# Patient Record
Sex: FEMALE | Race: WHITE | NOT HISPANIC OR LATINO | Employment: OTHER | URBAN - NONMETROPOLITAN AREA
[De-identification: names, ages, dates, MRNs, and addresses within clinical notes are randomized per-mention and may not be internally consistent; named-entity substitution may affect disease eponyms.]

---

## 2017-01-04 RX ORDER — PANTOPRAZOLE SODIUM 40 MG/1
TABLET, DELAYED RELEASE ORAL
Qty: 90 TAB | Refills: 3 | Status: SHIPPED | OUTPATIENT
Start: 2017-01-04 | End: 2017-12-19 | Stop reason: SDUPTHER

## 2017-01-14 DIAGNOSIS — F33.1 MODERATE EPISODE OF RECURRENT MAJOR DEPRESSIVE DISORDER (HCC): ICD-10-CM

## 2017-01-16 RX ORDER — SERTRALINE HYDROCHLORIDE 100 MG/1
100 TABLET, FILM COATED ORAL DAILY
Qty: 90 TAB | Refills: 3 | Status: SHIPPED | OUTPATIENT
Start: 2017-01-16 | End: 2017-04-11 | Stop reason: SDUPTHER

## 2017-01-31 ENCOUNTER — PATIENT MESSAGE (OUTPATIENT)
Dept: MEDICAL GROUP | Facility: PHYSICIAN GROUP | Age: 67
End: 2017-01-31

## 2017-01-31 DIAGNOSIS — F51.01 PRIMARY INSOMNIA: ICD-10-CM

## 2017-01-31 RX ORDER — TRAZODONE HYDROCHLORIDE 50 MG/1
50-100 TABLET ORAL
Qty: 60 TAB | Refills: 11 | Status: SHIPPED | OUTPATIENT
Start: 2017-01-31 | End: 2017-05-09

## 2017-01-31 NOTE — TELEPHONE ENCOUNTER
From: Ting Gregory  To: Tianna Tai M.D.  Sent: 1/31/2017 9:26 AM PST  Subject: Non-Urgent Medical Question    Hi Dr. ORELLANA, hope all is well with you and little one. I think it's time to replace the Ambien, find myself still awake most nights for 4 to 5 hours before going to sleep. Once in a while I have to then go take another pill and that'll work but I know you probably don't want me to do that so need your help. Thank you and will miss you on my next appt. in May. Take care. Ting

## 2017-02-11 ENCOUNTER — PATIENT MESSAGE (OUTPATIENT)
Dept: MEDICAL GROUP | Facility: PHYSICIAN GROUP | Age: 67
End: 2017-02-11

## 2017-02-11 DIAGNOSIS — F51.01 PRIMARY INSOMNIA: ICD-10-CM

## 2017-02-14 RX ORDER — ESZOPICLONE 2 MG/1
2 TABLET, FILM COATED ORAL
Qty: 30 TAB | Refills: 3 | Status: SHIPPED
Start: 2017-02-14 | End: 2017-05-09

## 2017-02-17 ENCOUNTER — TELEPHONE (OUTPATIENT)
Dept: MEDICAL GROUP | Facility: PHYSICIAN GROUP | Age: 67
End: 2017-02-17

## 2017-02-17 NOTE — TELEPHONE ENCOUNTER
Guru called and wanted to clarify which sleep prescription that Ting was taking as she has three different medications and all have refills.  I read the last note and called the pharmacy to confirm that the lunesta did not need a prior authorization processed.  Insurance covered it without the need for the p.a.   The pharmacy cancelled the refills of the trazadone and the ambien and processed the lunesta for .  Pt has been notified.

## 2017-04-11 DIAGNOSIS — F33.1 MODERATE EPISODE OF RECURRENT MAJOR DEPRESSIVE DISORDER (HCC): ICD-10-CM

## 2017-04-11 RX ORDER — SERTRALINE HYDROCHLORIDE 100 MG/1
100 TABLET, FILM COATED ORAL DAILY
Qty: 90 TAB | Refills: 3 | Status: SHIPPED | OUTPATIENT
Start: 2017-04-11 | End: 2017-08-28

## 2017-04-11 RX ORDER — SERTRALINE HYDROCHLORIDE 100 MG/1
TABLET, FILM COATED ORAL
Refills: 0 | OUTPATIENT
Start: 2017-04-11

## 2017-05-01 ENCOUNTER — HOSPITAL ENCOUNTER (OUTPATIENT)
Dept: LAB | Facility: MEDICAL CENTER | Age: 67
End: 2017-05-01
Attending: INTERNAL MEDICINE
Payer: MEDICARE

## 2017-05-01 DIAGNOSIS — E11.42 WELL CONTROLLED TYPE 2 DIABETES MELLITUS WITH PERIPHERAL NEUROPATHY (HCC): ICD-10-CM

## 2017-05-01 DIAGNOSIS — E78.00 PURE HYPERCHOLESTEROLEMIA: ICD-10-CM

## 2017-05-01 LAB
ALBUMIN SERPL BCP-MCNC: 4.6 G/DL (ref 3.2–4.9)
ALBUMIN/GLOB SERPL: 1.8 G/DL
ALP SERPL-CCNC: 99 U/L (ref 30–99)
ALT SERPL-CCNC: 24 U/L (ref 2–50)
ANION GAP SERPL CALC-SCNC: 10 MMOL/L (ref 0–11.9)
AST SERPL-CCNC: 15 U/L (ref 12–45)
BILIRUB SERPL-MCNC: 0.6 MG/DL (ref 0.1–1.5)
BUN SERPL-MCNC: 17 MG/DL (ref 8–22)
CALCIUM SERPL-MCNC: 9.3 MG/DL (ref 8.5–10.5)
CHLORIDE SERPL-SCNC: 102 MMOL/L (ref 96–112)
CHOLEST SERPL-MCNC: 260 MG/DL (ref 100–199)
CO2 SERPL-SCNC: 26 MMOL/L (ref 20–33)
CREAT SERPL-MCNC: 0.88 MG/DL (ref 0.5–1.4)
CREAT UR-MCNC: 443.9 MG/DL
EST. AVERAGE GLUCOSE BLD GHB EST-MCNC: 160 MG/DL
GFR SERPL CREATININE-BSD FRML MDRD: >60 ML/MIN/1.73 M 2
GLOBULIN SER CALC-MCNC: 2.6 G/DL (ref 1.9–3.5)
GLUCOSE SERPL-MCNC: 136 MG/DL (ref 65–99)
HBA1C MFR BLD: 7.2 % (ref 0–5.6)
HDLC SERPL-MCNC: 46 MG/DL
LDLC SERPL CALC-MCNC: 182 MG/DL
MICROALBUMIN UR-MCNC: 18.6 MG/DL
MICROALBUMIN/CREAT UR: 42 MG/G (ref 0–30)
POTASSIUM SERPL-SCNC: 4.2 MMOL/L (ref 3.6–5.5)
PROT SERPL-MCNC: 7.2 G/DL (ref 6–8.2)
SODIUM SERPL-SCNC: 138 MMOL/L (ref 135–145)
TRIGL SERPL-MCNC: 162 MG/DL (ref 0–149)
TSH SERPL DL<=0.005 MIU/L-ACNC: 1.29 UIU/ML (ref 0.3–3.7)

## 2017-05-01 PROCEDURE — 82043 UR ALBUMIN QUANTITATIVE: CPT

## 2017-05-01 PROCEDURE — 80053 COMPREHEN METABOLIC PANEL: CPT

## 2017-05-01 PROCEDURE — 84443 ASSAY THYROID STIM HORMONE: CPT

## 2017-05-01 PROCEDURE — 36415 COLL VENOUS BLD VENIPUNCTURE: CPT | Mod: GA

## 2017-05-01 PROCEDURE — 82570 ASSAY OF URINE CREATININE: CPT

## 2017-05-01 PROCEDURE — 83036 HEMOGLOBIN GLYCOSYLATED A1C: CPT | Mod: GA

## 2017-05-01 PROCEDURE — 80061 LIPID PANEL: CPT

## 2017-05-09 ENCOUNTER — OFFICE VISIT (OUTPATIENT)
Dept: MEDICAL GROUP | Facility: PHYSICIAN GROUP | Age: 67
End: 2017-05-09
Payer: MEDICARE

## 2017-05-09 VITALS
BODY MASS INDEX: 25.43 KG/M2 | HEART RATE: 81 BPM | OXYGEN SATURATION: 96 % | HEIGHT: 67 IN | TEMPERATURE: 98.4 F | DIASTOLIC BLOOD PRESSURE: 84 MMHG | SYSTOLIC BLOOD PRESSURE: 146 MMHG | RESPIRATION RATE: 16 BRPM | WEIGHT: 162 LBS

## 2017-05-09 DIAGNOSIS — E11.42 WELL CONTROLLED TYPE 2 DIABETES MELLITUS WITH PERIPHERAL NEUROPATHY (HCC): ICD-10-CM

## 2017-05-09 DIAGNOSIS — F33.1 MAJOR DEPRESSIVE DISORDER, RECURRENT EPISODE, MODERATE (HCC): ICD-10-CM

## 2017-05-09 DIAGNOSIS — Z00.00 HEALTH CARE MAINTENANCE: ICD-10-CM

## 2017-05-09 DIAGNOSIS — I10 ESSENTIAL HYPERTENSION: ICD-10-CM

## 2017-05-09 DIAGNOSIS — E78.00 PURE HYPERCHOLESTEROLEMIA: ICD-10-CM

## 2017-05-09 PROCEDURE — G8419 CALC BMI OUT NRM PARAM NOF/U: HCPCS | Performed by: NURSE PRACTITIONER

## 2017-05-09 PROCEDURE — 3045F PR MOST RECENT HEMOGLOBIN A1C LEVEL 7.0-9.0%: CPT | Performed by: NURSE PRACTITIONER

## 2017-05-09 PROCEDURE — 1101F PT FALLS ASSESS-DOCD LE1/YR: CPT | Performed by: NURSE PRACTITIONER

## 2017-05-09 PROCEDURE — 3014F SCREEN MAMMO DOC REV: CPT | Mod: 8P | Performed by: NURSE PRACTITIONER

## 2017-05-09 PROCEDURE — 99214 OFFICE O/P EST MOD 30 MIN: CPT | Performed by: NURSE PRACTITIONER

## 2017-05-09 PROCEDURE — G8432 DEP SCR NOT DOC, RNG: HCPCS | Performed by: NURSE PRACTITIONER

## 2017-05-09 PROCEDURE — 1036F TOBACCO NON-USER: CPT | Performed by: NURSE PRACTITIONER

## 2017-05-09 PROCEDURE — 4040F PNEUMOC VAC/ADMIN/RCVD: CPT | Performed by: NURSE PRACTITIONER

## 2017-05-09 ASSESSMENT — PATIENT HEALTH QUESTIONNAIRE - PHQ9
5. POOR APPETITE OR OVEREATING: 2 - MORE THAN HALF THE DAYS
SUM OF ALL RESPONSES TO PHQ QUESTIONS 1-9: 14
CLINICAL INTERPRETATION OF PHQ2 SCORE: 6

## 2017-05-09 NOTE — ASSESSMENT & PLAN NOTE
Patient declines most health care maintenance exams including Pap smears, mammograms and bone density and colon cancer screening exams. She does understand the risks associated with not having these done. She is willing to have routine fasting labs, she is here to go over her most recent labs today. She will be due for repeat labs in 6 months.

## 2017-05-09 NOTE — ASSESSMENT & PLAN NOTE
This is a chronic condition, poorly controlled. She is not on statin therapy as she does not tolerate. She is also allergic to fenofibrate. Her lipid profile is as follows:  Component      Latest Ref Rng 5/1/2017           8:05 AM   Cholesterol,Tot      100 - 199 mg/dL 260 (H)   Triglycerides      0 - 149 mg/dL 162 (H)   HDL      >=40 mg/dL 46   LDL      <100 mg/dL 182 (H)   She continues to work on this via diet and exercise. She is encouraged to continue with low-fat, low-cholesterol diet. I did discuss with her that she is in significantly increased risk for cardiovascular disease, patient verbalizes understanding. We will recheck this in 6 months.

## 2017-05-09 NOTE — ASSESSMENT & PLAN NOTE
Chronic in nature, stable and usually well controlled on amlodipine 10 mg daily. Her blood pressure today is 146/84 and she believes this is due to her being upset as it is around this time that she lost her  about 3 years ago to cancer. She tolerates her medications well with no significant bothersome side effects. She does not need refills at this time but will call when needed. We reviewed labs today all are essentially normal except for lipid profile (see additional notes). We will repeat labs in 6 months.

## 2017-05-09 NOTE — ASSESSMENT & PLAN NOTE
This is chronic in nature, stable and very well controlled on diet and exercise. Hemoglobin A1c is 7.2 and microalbumin creatinine ratio is mildly elevated at 42. She has gone up from 6.8 from November 2016. She admits to not eating very healthy recently, stating that April and May for a very difficult time for her due to the loss of her  around this time about 3 years ago. She states she is going to get back into healthy eating and regular physical activity. She was also encouraged to increase her fluids. She is unable to tolerate statins or Ace inhibitors. We will recheck labs in 6 months.

## 2017-05-09 NOTE — PATIENT INSTRUCTIONS
Follow up with me in 6 months, sooner if needed, with labs done before visit    Meds as prescribed     Call me with any concerns about BP and dose of medications

## 2017-05-09 NOTE — MR AVS SNAPSHOT
"        Ting Nguyễn Bri   2017 9:20 AM   Office Visit   MRN: [id number]    Department:  [facility]   Dept Phone:  [phone]    Description:  Female : 1950   Provider:  OLINDA Jones           Reason for Visit     Diabetes           Allergies as of 2017     Allergen Noted Reactions    Ace Inhibitors 2012   Swelling    Demerol 2017   Rash    Rash      Fiorinal 2017   Rash    Rash      Minipress [Fd&C Blue #1-Fd&C Red #40-Prazosin] 2017   Unspecified    Cannot remember reaction    Prednisone 2017   Unspecified    Migraine, joint pain swelling    Trilipix [Choline Fenofibrate] 2012       Angioedema, hives    Vicodin [Hydrocodone-Acetaminophen] 2017   Rash    Rash        You were diagnosed with     Major depressive disorder, recurrent episode, moderate (CMS-HCC)   [296.32.ICD-9-CM]       Essential hypertension   [6768818]       Pure hypercholesterolemia   [272.0.ICD-9-CM]       Well controlled type 2 diabetes mellitus with peripheral neuropathy (CMS-HCC)   [644675]         Vital Signs     Blood Pressure Pulse Temperature Respirations Height Weight    146/84 mmHg 81 36.9 °C (98.4 °F) 16 1.702 m (5' 7\") 73.483 kg (162 lb)    Body Mass Index Oxygen Saturation Smoking Status             25.37 kg/m2 96% Former Smoker         Basic Information     Date Of Birth Sex Race Ethnicity Preferred Language    1950 Female White Non- English      Problem List              ICD-10-CM Priority Class Noted - Resolved    Well controlled type 2 diabetes mellitus with peripheral neuropathy (CMS-HCC) E11.42   Unknown - Present    Hyperlipidemia E78.5   Unknown - Present    Hypertension I10   2012 - Present    Depression F32.9   2012 - Present    GERD (gastroesophageal reflux disease) K21.9   2013 - Present    Peripheral neuropathy G62.9   4/15/2015 - Present    Insomnia G47.00   2015 - Present    DNR (do not resuscitate) Z66   " 11/4/2015 - Present    DNI (do not intubate) Z78.9   11/4/2015 - Present    Major depressive disorder, recurrent episode, moderate (CMS-HCC) F33.1   11/4/2015 - Present    Other chest pain R07.89   5/4/2016 - Present    Allergy to statin medication Z88.8   11/8/2016 - Present      Health Maintenance        Date Due Completion Dates    DIABETES MONOFILAMENT / LE EXAM 4/15/2016 4/15/2015 (Done), 11/29/2012 (N/S)    Override on 4/15/2015: Done    Override on 11/29/2012: (N/S)    MAMMOGRAM 11/4/2016 11/4/2015 (Declined), 8/26/2013, 1/30/2012, 10/29/2009, 10/29/2009, 2/21/2008, 2/21/2008, 12/29/2006, 10/14/2005, 4/6/2005, 10/7/2004, 9/28/2004    Override on 11/4/2015: Patient Declined    PAP SMEAR 4/1/2017 4/1/2014    BONE DENSITY 5/1/2018 (Originally 8/2/2016) ---    RETINAL SCREENING 8/9/2017 8/9/2016    A1C SCREENING 11/1/2017 5/1/2017, 10/19/2016, 4/6/2016, 10/7/2015, 5/20/2015, 8/20/2014, 3/19/2014, 8/21/2013, 11/21/2012, 1/24/2012, 1/11/2010, 10/15/2009, 6/29/2009    FASTING LIPID PROFILE 5/1/2018 5/1/2017, 10/19/2016, 4/6/2016, 10/7/2015, 5/20/2015, 8/20/2014, 8/21/2013, 11/21/2012, 1/24/2012, 1/11/2010, 6/29/2009, 4/13/2006    URINE ACR / MICROALBUMIN 5/1/2018 5/1/2017, 10/7/2015, 8/20/2014, 6/29/2009    SERUM CREATININE 5/1/2018 5/1/2017, 10/19/2016, 10/7/2015, 5/20/2015, 8/20/2014, 8/21/2013, 11/21/2012, 1/24/2012, 6/29/2009, 4/13/2006    IMM PNEUMOCOCCAL 65+ (ADULT) LOW/MEDIUM RISK SERIES (2 of 2 - PPSV23) 8/15/2018 11/9/2016, 8/15/2013    COLONOSCOPY 11/4/2025 11/4/2015 (Declined), 11/29/2008 (N/S)    Override on 11/4/2015: Patient Declined    Override on 11/29/2008: (N/S)    IMM DTaP/Tdap/Td Vaccine (2 - Td) 11/9/2026 11/9/2016            Current Immunizations     13-VALENT PCV PREVNAR 11/9/2016    INFLUENZA VACCINE H1N1 1/11/2010 11:15 AM    Influenza TIV (IM) 10/8/2016, 10/6/2015    Pneumococcal polysaccharide vaccine (PPSV-23) 8/15/2013 11:15 AM    SHINGLES VACCINE 11/9/2016    Tdap Vaccine 11/9/2016         Below and/or attached are the medications your provider expects you to take. Review all of your home medications and newly ordered medications with your provider and/or pharmacist. Follow medication instructions as directed by your provider and/or pharmacist. Please keep your medication list with you and share with your provider. Update the information when medications are discontinued, doses are changed, or new medications (including over-the-counter products) are added; and carry medication information at all times in the event of emergency situations     Allergies:  ACE INHIBITORS - Swelling     DEMEROL - Rash     FIORINAL - Rash     MINIPRESS - Unspecified     PREDNISONE - Unspecified     TRILIPIX - (reactions not documented)     VICODIN - Rash               Medications  Valid as of: May 09, 2017 -  9:44 AM    Generic Name Brand Name Tablet Size Instructions for use    AmLODIPine Besylate (Tab) NORVASC 10 MG Take 1 Tab by mouth every day.        Cholecalciferol (Tab) Vitamin D3 5000 UNITS Take 1,000 mg by mouth.        Pantoprazole Sodium (Tablet Delayed Response) PROTONIX 40 MG TAKE 1 TABLET BY MOUTH EVERY DAY        Sertraline HCl (Tab) ZOLOFT 100 MG Take 1 Tab by mouth every day.        .                 Medicines prescribed today were sent to:     [place] [address]    [address] [facility] [address]    Phone: [phone] Fax: [phone]    Open 24 Hours?: No    [facility] NOT VALID, USE [facility] - [facility], [place] - [address]    [address] [facility] [address]    Phone: [phone] Fax: [phone]    Open 24 Hours?: No    [facility] [place] [facility] [address]    [address] [facility] [place] [address]    Phone: [phone] Fax: [phone]    Open 24 Hours?: No      Medication refill instructions:       If your prescription bottle indicates you have medication refills left, it is not necessary to call  your provider’s office. Please contact your pharmacy and they will refill your medication.    If your prescription bottle indicates you do not have any refills left, you may request refills at any time through one of the following ways: The online [employer] system (except Urgent Care), by calling your provider’s office, or by asking your pharmacy to contact your provider’s office with a refill request. Medication refills are processed only during regular business hours and may not be available until the next business day. Your provider may request additional information or to have a follow-up visit with you prior to refilling your medication.   *Please Note: Medication refills are assigned a new Rx number when refilled electronically. Your pharmacy may indicate that no refills were authorized even though a new prescription for the same medication is available at the pharmacy. Please request the medicine by name with the pharmacy before contacting your provider for a refill.        Your To Do List     Future Labs/Procedures Complete By Expires    COMP METABOLIC PANEL  As directed 5/9/2018    HEMOGLOBIN A1C  As directed 5/9/2018    LIPID PROFILE  As directed 5/9/2018    MICROALBUMIN CREAT RATIO URINE  As directed 5/9/2018      Instructions    Follow up with me in 6 months, sooner if needed, with labs done before visit    Meds as prescribed     Call me with any concerns about BP and dose of medications       Other Notes About Your Plan     Patient has DNR papers. 11/2015           [employer] Access Code: Activation code not generated  Current [employer] Status: Active

## 2017-05-09 NOTE — PROGRESS NOTES
Chief Complaint   Patient presents with   • Diabetes         This is a 66 y.o.female patient that presents today with the following: Social care with new PCP, acute and chronic conditions, review labs    Health care maintenance  Patient declines most health care maintenance exams including Pap smears, mammograms and bone density and colon cancer screening exams. She does understand the risks associated with not having these done. She is willing to have routine fasting labs, she is here to go over her most recent labs today. She will be due for repeat labs in 6 months.    Well controlled type 2 diabetes mellitus with peripheral neuropathy  This is chronic in nature, stable and very well controlled on diet and exercise. Hemoglobin A1c is 7.2 and microalbumin creatinine ratio is mildly elevated at 42. She has gone up from 6.8 from November 2016. She admits to not eating very healthy recently, stating that April and May for a very difficult time for her due to the loss of her  around this time about 3 years ago. She states she is going to get back into healthy eating and regular physical activity. She was also encouraged to increase her fluids. She is unable to tolerate statins or Ace inhibitors. We will recheck labs in 6 months.    Major depressive disorder, recurrent episode, moderate  This is a chronic condition, currently poor to fair control--she thinks because of the time of year as May is when she lost her  about 3 years ago to cancer. She scores a 14 on the depression screening test today. She is currently on Zoloft, 100 mg daily. She tolerates this well with no significant or bothersome side effects. We did discuss either increasing the dose or changing to a different medication. She declines this at this time and states that she will come see me at a later date if her symptoms do not improve over the next few months. She does deny suicidal and homicidal ideations, hallucinations, racing thoughts  and flights of ideas.    Hypertension  Chronic in nature, stable and usually well controlled on amlodipine 10 mg daily. Her blood pressure today is 146/84 and she believes this is due to her being upset as it is around this time that she lost her  about 3 years ago to cancer. She tolerates her medications well with no significant bothersome side effects. She does not need refills at this time but will call when needed. We reviewed labs today all are essentially normal except for lipid profile (see additional notes). We will repeat labs in 6 months.    Hyperlipidemia  This is a chronic condition, poorly controlled. She is not on statin therapy as she does not tolerate. She is also allergic to fenofibrate. Her lipid profile is as follows:  Component      Latest Ref Rng 5/1/2017           8:05 AM   Cholesterol,Tot      100 - 199 mg/dL 260 (H)   Triglycerides      0 - 149 mg/dL 162 (H)   HDL      >=40 mg/dL 46   LDL      <100 mg/dL 182 (H)   She continues to work on this via diet and exercise. She is encouraged to continue with low-fat, low-cholesterol diet. I did discuss with her that she is in significantly increased risk for cardiovascular disease, patient verbalizes understanding. We will recheck this in 6 months.      Hospital Outpatient Visit on 05/01/2017   Component Date Value   • Glycohemoglobin 05/01/2017 7.2*   • Est Avg Glucose 05/01/2017 160    • Sodium 05/01/2017 138    • Potassium 05/01/2017 4.2    • Chloride 05/01/2017 102    • Co2 05/01/2017 26    • Anion Gap 05/01/2017 10.0    • Glucose 05/01/2017 136*   • Bun 05/01/2017 17    • Creatinine 05/01/2017 0.88    • Calcium 05/01/2017 9.3    • AST(SGOT) 05/01/2017 15    • ALT(SGPT) 05/01/2017 24    • Alkaline Phosphatase 05/01/2017 99    • Total Bilirubin 05/01/2017 0.6    • Albumin 05/01/2017 4.6    • Total Protein 05/01/2017 7.2    • Globulin 05/01/2017 2.6    • A-G Ratio 05/01/2017 1.8    • Cholesterol,Tot 05/01/2017 260*   • Triglycerides  2017 162*   • HDL 2017 46    • LDL 2017 182*   • Creatinine, Urine 2017 443.90    • Microalbumin, Urine Rand* 2017 18.6    • Micro Alb Creat Ratio 2017 42*   • TSH 2017 1.290    • GFR If  2017 >60    • GFR If Non  Ameri* 2017 >60          clinical course has been stable    Past Medical History   Diagnosis Date   • Diabetes mellitus type 2 in nonobese (CMS-HCC)    • Hyperlipidemia    • Asthma    • History of mammogram      fibrocystic breast disease   • Pelvic exam      ASCUS, then had hysterectomy   • S/P colonoscopy         • Irritable bowel syndrome    • Vitamin D deficiency        Past Surgical History   Procedure Laterality Date   • Hysterectomy, total abdominal       took everything   • Kristin by laparoscopy     • Dilation and curettage       x4   • Cystectomy         Family History   Problem Relation Age of Onset   • Hypertension Mother    • Other Father      RA,  age 40       Ace inhibitors; Demerol; Fiorinal; Minipress; Prednisone; Trilipix; and Vicodin    Current Outpatient Prescriptions Ordered in [facility]   Medication Sig Dispense Refill   • sertraline (ZOLOFT) 100 MG Tab Take 1 Tab by mouth every day. 90 Tab 3   • pantoprazole (PROTONIX) 40 MG Tablet Delayed Response TAKE 1 TABLET BY MOUTH EVERY DAY 90 Tab 3   • amlodipine (NORVASC) 10 MG Tab Take 1 Tab by mouth every day. 90 Tab 3   • Cholecalciferol (VITAMIN D3) 5000 UNIT TABS Take 1,000 mg by mouth.       No current Epic-ordered facility-administered medications on file.       Constitutional ROS: No unexpected change in weight, No weakness, No unexplained fevers, sweats, or chills  Pulmonary ROS: No chronic cough, sputum, or hemoptysis, No shortness of breath, No recent change in breathing  Cardiovascular ROS: No chest pain, No edema, No palpitations, Positive for hypertension, per history of present illness  Gastrointestinal ROS: No abdominal pain, No nausea, vomiting,  "diarrhea, or constipation, no blood in stool  Musculoskeletal/Extremities ROS: No clubbing, No peripheral edema, No pain, redness or swelling on the joints  Neurologic ROS: Normal development, No seizures, No weakness  Psychiatric ROS: As per history of present illness  Endocrine ROS: Positive per history of present illness    Physical exam:  /84 mmHg  Pulse 81  Temp(Src) 36.9 °C (98.4 °F)  Resp 16  Ht 1.702 m (5' 7\")  Wt 73.483 kg (162 lb)  BMI 25.37 kg/m2  SpO2 96%  General Appearance: Older female, alert, no distress, mildly overweight, well-groomed  Skin: Skin color, texture, turgor normal. No rashes or lesions.  Lungs: negative findings: normal respiratory rate and rhythm, lungs clear to auscultation  Heart: negative. RRR without murmur, gallop, or rubs.  No ectopy.  Abdomen: Abdomen soft, non-tender. BS normal. No masses,  No organomegaly  Musculoskeletal: negative findings: no erythema, induration, or nodules, no evidence of joint effusion, strength normal, no deformities present  Neurologic: intact, oriented, mood appropriate, judgment intact. Cranial nerves II-12 grossly intact  Diabetic Foot Exam: No ulcers, erythema or skin lesions present, patient tested with monofilament (10g) and tuning fork found to be mildly decreased bilaterally throughout the ball of the foot, great toe and heel. Circulation intact.    Medical decision making/discussion: Patient here for follow-up visit, review labs and discuss acute and chronic conditions. She is to follow-up with me in 6 months, with labs done before her visit. She is to continue on low-fat, low-cholesterol diet. She is to let me know in a month or so if blood pressure does not improve, may need to adjust dose.    Ting was seen today for diabetes.    Diagnoses and all orders for this visit:    Major depressive disorder, recurrent episode, moderate (CMS-HCC)    Essential hypertension  -     COMP METABOLIC PANEL; Future  -     LIPID PROFILE; " Future    Pure hypercholesterolemia  -     COMP METABOLIC PANEL; Future  -     LIPID PROFILE; Future    Well controlled type 2 diabetes mellitus with peripheral neuropathy (CMS-HCC)  -     Diabetic Monofilament Lower Extremity Exam  -     HEMOGLOBIN A1C; Future  -     MICROALBUMIN CREAT RATIO URINE; Future    Health care maintenance          Please note that this dictation was created using voice recognition software. I have made every reasonable attempt to correct obvious errors, but I expect that there are errors of grammar and possibly content that I did not discover before finalizing the note.

## 2017-05-09 NOTE — ASSESSMENT & PLAN NOTE
This is a chronic condition, currently poor to fair control--she thinks because of the time of year as May is when she lost her  about 3 years ago to cancer. She scores a 14 on the depression screening test today. She is currently on Zoloft, 100 mg daily. She tolerates this well with no significant or bothersome side effects. We did discuss either increasing the dose or changing to a different medication. She declines this at this time and states that she will come see me at a later date if her symptoms do not improve over the next few months. She does deny suicidal and homicidal ideations, hallucinations, racing thoughts and flights of ideas.

## 2017-08-14 ENCOUNTER — PATIENT OUTREACH (OUTPATIENT)
Dept: HEALTH INFORMATION MANAGEMENT | Facility: OTHER | Age: 67
End: 2017-08-14

## 2017-08-14 NOTE — PROGRESS NOTES
Attempt #:1    WebIZ Checked & Epic Updated: yes  HealthConnect Verified: no  Verify PCP: yes    Communication Preference Obtained: yes     Review Care Team: yes    Annual Wellness Visit Scheduling  1. Scheduling Status:Scheduled       Care Gap Scheduling (Attempt to Schedule EACH Overdue Care Gap!)     Health Maintenance Due   Topic Date Due   • Annual Wellness Visit  Scheduled   • RETINAL SCREENING  Requesting Records          [employer] Activation: already active  [employer] Salome: no  Virtual Visits: no  Opt In to Text Messages: no

## 2017-08-28 ENCOUNTER — OFFICE VISIT (OUTPATIENT)
Dept: MEDICAL GROUP | Facility: PHYSICIAN GROUP | Age: 67
End: 2017-08-28
Payer: MEDICARE

## 2017-08-28 VITALS
HEART RATE: 76 BPM | HEIGHT: 67 IN | OXYGEN SATURATION: 96 % | TEMPERATURE: 98.2 F | SYSTOLIC BLOOD PRESSURE: 142 MMHG | WEIGHT: 168 LBS | BODY MASS INDEX: 26.37 KG/M2 | DIASTOLIC BLOOD PRESSURE: 90 MMHG

## 2017-08-28 DIAGNOSIS — F51.01 PRIMARY INSOMNIA: ICD-10-CM

## 2017-08-28 DIAGNOSIS — Z00.00 HEALTH CARE MAINTENANCE: ICD-10-CM

## 2017-08-28 DIAGNOSIS — Z00.00 MEDICARE ANNUAL WELLNESS VISIT, INITIAL: Primary | ICD-10-CM

## 2017-08-28 DIAGNOSIS — Z88.8 ALLERGY TO STATIN MEDICATION: ICD-10-CM

## 2017-08-28 DIAGNOSIS — Z78.9 DNI (DO NOT INTUBATE): ICD-10-CM

## 2017-08-28 DIAGNOSIS — Z66 DNR (DO NOT RESUSCITATE): ICD-10-CM

## 2017-08-28 DIAGNOSIS — K21.9 GASTROESOPHAGEAL REFLUX DISEASE, ESOPHAGITIS PRESENCE NOT SPECIFIED: ICD-10-CM

## 2017-08-28 DIAGNOSIS — R07.89 OTHER CHEST PAIN: ICD-10-CM

## 2017-08-28 DIAGNOSIS — E11.42 WELL CONTROLLED TYPE 2 DIABETES MELLITUS WITH PERIPHERAL NEUROPATHY (HCC): ICD-10-CM

## 2017-08-28 DIAGNOSIS — F33.1 MAJOR DEPRESSIVE DISORDER, RECURRENT EPISODE, MODERATE (HCC): ICD-10-CM

## 2017-08-28 DIAGNOSIS — I10 ESSENTIAL HYPERTENSION: ICD-10-CM

## 2017-08-28 DIAGNOSIS — F32.1 MODERATE SINGLE CURRENT EPISODE OF MAJOR DEPRESSIVE DISORDER (HCC): ICD-10-CM

## 2017-08-28 DIAGNOSIS — E78.5 HYPERLIPIDEMIA, UNSPECIFIED HYPERLIPIDEMIA TYPE: ICD-10-CM

## 2017-08-28 PROCEDURE — G0438 PPPS, INITIAL VISIT: HCPCS | Performed by: NURSE PRACTITIONER

## 2017-08-28 ASSESSMENT — PATIENT HEALTH QUESTIONNAIRE - PHQ9
5. POOR APPETITE OR OVEREATING: 1 - SEVERAL DAYS
CLINICAL INTERPRETATION OF PHQ2 SCORE: 4
SUM OF ALL RESPONSES TO PHQ QUESTIONS 1-9: 6

## 2017-08-28 NOTE — ASSESSMENT & PLAN NOTE
"Pt states feels better re passing of spouse, cont to feel depressed but does not feel the need for med  Feels \"where I need to be\"  Followed by Iva Nelson, ORLIN..   "

## 2017-08-28 NOTE — ASSESSMENT & PLAN NOTE
Denies chest pain, states not cardiac  Has declined cardiac work up in past  Followed by OLINDA Jones.

## 2017-08-28 NOTE — ASSESSMENT & PLAN NOTE
Chronic condition managed with current medical regimen  Labs reviewed    Continue with diet management, exercise, at this time  Followed by OLINDA Jones.

## 2017-08-28 NOTE — ASSESSMENT & PLAN NOTE
Chronic condition managed with current medical regimen  Stable per review   Continue with current meds  Followed by OLINDA Jones.

## 2017-08-28 NOTE — PATIENT INSTRUCTIONS
Continue with care through [facility].  Next Medicare Annual Wellness Visit is due in one year.    Continue care with specialists you are seeing for your chronic problems.    Attached is some preventative information for you to read.          Fall Prevention and Home Safety  Falls cause injuries and can affect all age groups. It is possible to prevent falls.   HOW TO PREVENT FALLS  · Wear shoes with rubber soles that do not have an opening for your toes.   · Keep the inside and outside of your house well lit.   · Use night lights throughout your home.   · Remove clutter from floors.   · Clean up floor spills.   · Remove throw rugs or fasten them to the floor with carpet tape.   · Do not place electrical cords across pathways.   · Put grab bars by your tub, shower, and toilet. Do not use towel bars as grab bars.   · Put handrails on both sides of the stairway. Fix loose handrails.   · Do not climb on stools or stepladders, if possible.   · Do not wax your floors.   · Repair uneven or unsafe sidewalks, walkways, or stairs.   · Keep items you use a lot within reach.   · Be aware of pets.   · Keep emergency numbers next to the telephone.   · Put smoke detectors in your home and near bedrooms.   Ask your doctor what other things you can do to prevent falls.  Document Released: 10/14/2010 Document Revised: 06/18/2013 Document Reviewed: 03/19/2013  ExitCare® Patient Information ©2013 [employer] [facility].    Exercise to Stay Healthy      Exercise helps you become and stay healthy.  EXERCISE IDEAS AND TIPS  Choose exercises that:  · You enjoy.   · Fit into your day.   You do not need to exercise really hard to be healthy. You can do exercises at a slow or medium level and stay healthy. You can:  · Stretch before and after working out.   · Try yoga, Pilates, or keeley chi.   · Lift weights.   · Walk fast, swim, jog, run, climb stairs, bicycle, dance, or rollerskate.   · Take aerobic classes.   Exercises that burn about  150 calories:  · Running 1 ½ miles in 15 minutes.   · Playing volleyball for 45 to 60 minutes.   · Washing and waxing a car for 45 to 60 minutes.   · Playing touch football for 45 minutes.   · Walking 1 ¾ miles in 35 minutes.   · Pushing a stroller 1 ½ miles in 30 minutes.   · Playing basketball for 30 minutes.   · Raking leaves for 30 minutes.   · Bicycling 5 miles in 30 minutes.   · Walking 2 miles in 30 minutes.   · Dancing for 30 minutes.   · Shoveling snow for 15 minutes.   · Swimming laps for 20 minutes.   · Walking up stairs for 15 minutes.   · Bicycling 4 miles in 15 minutes.   · Gardening for 30 to 45 minutes.   · Jumping rope for 15 minutes.   · Washing windows or floors for 45 to 60 minutes.     One suggestion is to start walking for 10 minutes after each meal.  This will help with digestion and help you to metabolize your meal.       For Weight Loss -   Recommend portion sizes with more fruits/vegetables/high fiber foods.  Stay away from white bread/pastas/white rice/white potatoes.  Increase Fluid intake to 6-8 glasses of water daily.   Eliminate soda's (diet and regular) from your fluid intake.      Document Released: 01/20/2012 Document Revised: 03/11/2013 Document Reviewed: 01/20/2012  ExitCare® Patient Information ©2013 [employer], [employer].    Fat and Cholesterol Control Diet  Cholesterol is a wax-like substance. It comes from your liver and is found in certain foods. There is good (HDL) and bad (LDL) cholesterol. Too much cholesterol in your blood can affect your heart. Certain foods can lower or raise your cholesterol. Eat foods that are low in cholesterol.  Saturated and trans fats are bad fats found in foods that will raise your cholesterol. Do not eat foods that are high in saturated and trans fats.  FOODS HIGHER IN SATURATED AND TRANS FATS  · Dairy products, such as whole milk, eggs, cheese, cream, and butter.   · Fatty meats, such as hot dogs, sausage, and salami.   · Fried foods.   · Trans fats  which are found in margarine and pre-made cookies, crackers, and baked goods.   · Tropical oils, such as coconut and palm oils.   Read package labels at the store. Do not buy products that use saturated or trans fats or hydrogenated oils. Find foods labeled:  · Low-fat.   · Low-saturated fat.   · Trans-fat-free.   · Low-cholesterol.   FOODS LOWER IN CHOLESTEROL  ·  Fruit.   · Vegetables.   · Beans, peas, and lentils.   · Fish.   · Lean meat, such as chicken (without skin) or ground turkey.   · Grains, such as barley, rice, couscous, bulgur wheat, and pasta.   · Heart-healthy tub margarine.   PREPARING YOUR FOOD  · Broil, bake, steam, or roast foods. Do not montero food.   · Use non-stick cooking sprays.   · Use lemon or herbs to flavor food instead of using butter or stick margarine.   · Use nonfat yogurt, salsa, or low-fat dressings for salads.   Document Released: 06/18/2013 Document Reviewed: 06/18/2013  ExitCare® Patient Information ©2013 [employer].    Recommend annual flu vaccine.  Next due in Fall, 2017.  If you decide not to have the flu vaccine, recommend good handwashing and staying out of crowds during flu season.       Basic Carbohydrate Counting for Diabetes Mellitus  Carbohydrate counting is a method for keeping track of the amount of carbohydrates you eat. Eating carbohydrates naturally increases the level of sugar (glucose) in your blood, so it is important for you to know the amount that is okay for you to have in every meal. Carbohydrate counting helps keep the level of glucose in your blood within normal limits. The amount of carbohydrates allowed is different for every person. A dietitian can help you calculate the amount that is right for you. Once you know the amount of carbohydrates you can have, you can count the carbohydrates in the foods you want to eat.  Carbohydrates are found in the following foods:  · Grains, such as breads and cereals.  · Dried beans and soy products.  · Starchy  "vegetables, such as potatoes, peas, and corn.  · Fruit and fruit juices.  · Milk and yogurt.  · Sweets and snack foods, such as cake, cookies, candy, chips, soft drinks, and fruit drinks.  CARBOHYDRATE COUNTING  There are two ways to count the carbohydrates in your food. You can use either of the methods or a combination of both.  Reading the \"Nutrition Facts\" on Packaged Food  The \"Nutrition Facts\" is an area that is included on the labels of almost all packaged food and beverages in the [place]. It includes the serving size of that food or beverage and information about the nutrients in each serving of the food, including the grams (g) of carbohydrate per serving.   Decide the number of servings of this food or beverage that you will be able to eat or drink. Multiply that number of servings by the number of grams of carbohydrate that is listed on the label for that serving. The total will be the amount of carbohydrates you will be having when you eat or drink this food or beverage.  Learning Standard Serving Sizes of Food  When you eat food that is not packaged or does not include \"Nutrition Facts\" on the label, you need to measure the servings in order to count the amount of carbohydrates. A serving of most carbohydrate-rich foods contains about 15 g of carbohydrates. The following list includes serving sizes of carbohydrate-rich foods that provide 15 g of carbohydrate per serving:    · 1 slice of bread (1 oz) or 1 six-inch tortilla.    · ½ of a hamburger bun or English muffin.  · 4-6 crackers.  · ¾ cup unsweetened dry cereal.    · ½ cup hot cereal.  ·  cup rice or pasta.    · ½ cup mashed potatoes or ¼ of a large baked potato.  · 1 cup fresh fruit or one small piece of fruit.    · ½ cup canned or frozen fruit or fruit juice.  · 1 cup milk.  ·  cup plain fat-free yogurt or yogurt sweetened with artificial sweeteners.  · ½ cup cooked dried beans or starchy vegetable, such as peas, corn, or potatoes. "    Decide the number of standard-size servings that you will eat. Multiply that number of servings by 15 (the grams of carbohydrates in that serving). For example, if you eat 2 cups of strawberries, you will have eaten 2 servings and 30 g of carbohydrates (2 servings x 15 g = 30 g). For foods such as soups and casseroles, in which more than one food is mixed in, you will need to count the carbohydrates in each food that is included.  EXAMPLE OF CARBOHYDRATE COUNTING  Sample Dinner   · 3 oz chicken breast.  ·  cup of brown rice.  · ½ cup of corn.  · 1 cup milk.    · 1 cup strawberries with sugar-free whipped topping.    Carbohydrate Calculation   Step 1: Identify the foods that contain carbohydrates:   · Rice.    · Corn.    · Milk.    · Strawberries.  Step 2: Calculate the number of servings eaten of each:   · 2 servings of rice.    · 1 serving of corn.    · 1 serving of milk.    · 1 serving of strawberries.  Step 3:  Multiply each of those number of servings by 15 g:   · 2 servings of rice x 15 g = 30 g.    · 1 serving of corn x 15 g = 15 g.    · 1 serving of milk x 15 g = 15 g.    · 1 serving of strawberries x 15 g = 15 g.  Step 4: Add together all of the amounts to find the total grams of carbohydrates eaten: 30 g + 15 g + 15 g + 15 g = 75 g.     This information is not intended to replace advice given to you by your health care provider. Make sure you discuss any questions you have with your health care provider.     Document Released: 12/18/2006 Document Revised: 01/08/2016 Document Reviewed: 11/14/2014  [employer] Patient Education ©2016 [employer]

## 2017-08-28 NOTE — ASSESSMENT & PLAN NOTE
Chronic condition managed with current medical regimen  Stable per review  Able to sleep without medication  Followed by [facility].

## 2017-08-28 NOTE — ASSESSMENT & PLAN NOTE
Chronic condition managed with current medical regimen  Stable per review, toes affected, no change   Continue with surveillance  Followed by [facility].

## 2017-08-28 NOTE — PROGRESS NOTES
CC:   Medicare Annual Wellness Visit    HPI:  Ting is a 67 y.o. here for Medicare Annual Wellness Visit    Patient Active Problem List    Diagnosis Date Noted   • Health care maintenance 05/09/2017   • Allergy to statin medication 11/08/2016   • Other chest pain 05/04/2016   • Insomnia 11/04/2015   • DNR (do not resuscitate) 11/04/2015   • DNI (do not intubate) 11/04/2015   • Major depressive disorder, recurrent episode, moderate (CMS-HCC) 11/04/2015   • Peripheral neuropathy 04/15/2015   • GERD (gastroesophageal reflux disease) 12/27/2013   • Hypertension 05/23/2012   • Well controlled type 2 diabetes mellitus with peripheral neuropathy (CMS-HCC)    • Hyperlipidemia      Current Outpatient Prescriptions   Medication Sig Dispense Refill   • pantoprazole (PROTONIX) 40 MG Tablet Delayed Response TAKE 1 TABLET BY MOUTH EVERY DAY 90 Tab 3   • amlodipine (NORVASC) 10 MG Tab Take 1 Tab by mouth every day. 90 Tab 3   • Cholecalciferol (VITAMIN D3) 5000 UNIT TABS Take 1,000 mg by mouth.       No current facility-administered medications for this visit.       Current supplements: no  Chronic narcotic pain medicines: no  Allergies: Ace inhibitors; Demerol; Fiorinal; Minipress [fd&c blue #1-fd&c red #40-prazosin]; Prednisone; Trilipix [choline fenofibrate]; and Vicodin [hydrocodone-acetaminophen]  Exercise: yes  Current social contact/activities: Has support of family and friends      Screening:  Depression Screening    Little interest or pleasure in doing things?  2 - more than half the days  Feeling down, depressed , or hopeless? 2 - more than half the days  Trouble falling or staying asleep, or sleeping too much?  0 - not at all  Feeling tired or having little energy?  0 - not at all  Poor appetite or overeating?  1 - several days  Feeling bad about yourself - or that you are a failure or have let yourself or your family down? 0 - not at all  Trouble concentrating on things, such as reading the newspaper or watching  television? 1 - several days  Moving or speaking so slowly that other people could have noticed.  Or the opposite - being so fidgety or restless that you have been moving around a lot more than usual?  0 - not at all  Thoughts that you would be better off dead, or of hurting yourself?  0 - not at all  Patient Health Questionnaire Score: 6    If depressive symptoms identified deferred to follow up visit unless specifically addressed in assessment and plan.    Interpretation of PHQ-9 Total Score   Score Severity   1-4 No Depression   5-9 Mild Depression   10-14 Moderate Depression   15-19 Moderately Severe Depression   20-27 Severe Depression      Screening for Cognitive Impairment    Three Minute Recall (apple, watch, miguel)  2/3    Draw clock face with all 12 numbers set to the hand to show 10 minutes past 11 o'clock  1 5  Cognitive concerns identified deferred for follow up unless specifically addressed in assessment and plan.    Fall Risk Assessment    Has the patient had two or more falls in the last year or any fall with injury in the last year?  No    Safety Assessment    Throw rugs on floor.  No  Handrails on all stairs.  Yes  Good lighting in all hallways.  Yes  Difficulty hearing.  No  Patient counseled about all safety risks that were identified.    Functional Assessment ADLs    Are there any barriers preventing you from cooking for yourself or meeting nutritional needs?  No.    Are there any barriers preventing you from driving safely or obtaining transportation?  No.    Are there any barriers preventing you from using a telephone or calling for help?  No.    Are there any barriers preventing you from shopping?  No.    Are there any barriers preventing you from taking care of your own finances?  No.    Are there any barriers preventing you from managing your medications?  No.    Are currently engaging any exercise or physical activity?  No.       Health Maintenance Summary                Annual Wellness  Visit Overdue 1950     RETINAL SCREENING Overdue 8/9/2017      Done 8/9/2016 REFERRAL FOR RETINAL SCREENING EXAM    IMM INFLUENZA Next Due 9/1/2017      Done 10/8/2016 Imm Admin: Influenza TIV (IM)     Patient has more history with this topic...    MAMMOGRAM Postponed 5/1/2018 Originally 11/4/2016. Patient Refused     Patient Declined 11/4/2015      Patient has more history with this topic...    BONE DENSITY Postponed 5/1/2018 Originally 8/2/2016. Patient Refused    PAP SMEAR Postponed 5/1/2018 Originally 4/1/2017. Patient Refused     Done 4/1/2014 PAP IG, HPV-HR    A1C SCREENING Next Due 11/1/2017      Done 5/1/2017 HEMOGLOBIN A1C (A)     Patient has more history with this topic...    FASTING LIPID PROFILE Next Due 5/1/2018      Done 5/1/2017 LIPID PROFILE (A)     Patient has more history with this topic...    URINE ACR / MICROALBUMIN Next Due 5/1/2018      Done 5/1/2017 MICROALBUMIN CREAT RATIO URINE (A)     Patient has more history with this topic...    SERUM CREATININE Next Due 5/1/2018      Done 5/1/2017 COMP METABOLIC PANEL (A)     Patient has more history with this topic...    DIABETES MONOFILAMENT / LE EXAM Next Due 5/9/2018      Done 5/9/2017 AMB DIABETIC MONOFILAMENT LOWER EXTREMITY EXAM     Patient has more history with this topic...    COLONOSCOPY Next Due 11/4/2025      Patient Declined 11/4/2015      Patient has more history with this topic...    IMM DTaP/Tdap/Td Vaccine Next Due 11/9/2026      Done 11/9/2016 Imm Admin: Tdap Vaccine          Patient Care Team:  [facility] as PCP - General (Family Medicine)  Lisseth Marcial as Consulting Physician (Optometry)  Karie Saxena as Consulting Physician        Social History   Substance Use Topics   • Smoking status: Former Smoker     Packs/day: 1.50     Years: 16.00     Types: Cigarettes     Quit date: 1/20/1980   • Smokeless tobacco: Never Used      Comment: works in [employer]   • Alcohol use No       Family History   Problem  "Relation Age of Onset   • Hypertension Mother    • Other Father      RA,  age 40     She  has a past medical history of Asthma; Diabetes mellitus type 2 in nonobese (CMS-HCC); History of mammogram; Hyperlipidemia; Irritable bowel syndrome; Pelvic exam; S/P colonoscopy; and Vitamin D deficiency. She also has no past medical history of Breast cancer (CMS-HCC) or Encounter for long-term (current) use of other medications.   Past Surgical History:   Procedure Laterality Date   • SOLO BY LAPAROSCOPY     • CYSTECTOMY     • DILATION AND CURETTAGE      x4   • HYSTERECTOMY, TOTAL ABDOMINAL      took everything       ROS:    Ostomy or other tubes or amputations: no  Chronic oxygen use no  Last eye exam 8/10/2017 retinal exam done  : Denies incontinence.   Gait: Uses no assistive device   Hearing excellent.    Dentition good    Exam: Blood pressure 142/90, pulse 76, temperature 36.8 °C (98.2 °F), height 1.702 m (5' 7\"), weight 76.2 kg (168 lb), SpO2 96 %. Body mass index is 26.31 kg/m².  Alert, oriented in no acute distress.  Eye contact is good, speech goal directed, affect calm      Assessment and Plan. The following treatment and monitoring plan is recommended for all problems as listed below:   Well controlled type 2 diabetes mellitus with peripheral neuropathy  Chronic condition managed with current medical regimen  2017   Glucose 136  65 - 99 mg/dL Final     Glycohemoglobin 7.2  0.0 - 5.6 % Final   Comment:    Continue with diet management  Followed by [facility].        Hyperlipidemia  Chronic condition managed with current medical regimen  Labs reviewed    Continue with diet management, exercise, at this time  Followed by [facility].        Hypertension  Chronic condition managed with current medical regimen  Stable per review   Continue with current meds  Followed by [facility].        Depression  duplicate    GERD (gastroesophageal reflux disease)  Chronic " "condition managed with current medical regimen  Stable per review   Continue with current meds  Followed by [facility].        Peripheral neuropathy  Chronic condition managed with current medical regimen  Stable per review, toes affected, no change   Continue with surveillance  Followed by [facility].        Insomnia  Chronic condition managed with current medical regimen  Stable per review  Able to sleep without medication  Followed by [facility].        DNR (do not resuscitate)  Noted in chart    DNI (do not intubate)  Noted in chart    Other chest pain  Denies chest pain, states not cardiac  Has declined cardiac work up in past  Followed by [facility].     Allergy to statin medication  Noted, pt not on a statin    Health care maintenance  Managed by [facility]      Major depressive disorder, recurrent episode, moderate (CMS-HCC)  Pt states feels better re passing of spouse, cont to feel depressed but does not feel the need for med  Feels \"where I need to be\"  Followed by [facility].       Health Care Screening recommendations reviewed with patient today: per Patient Instructions  DPA/Advanced directive: recom copy of advanced directive be provided    Next office visit for recheck of chronic medical conditions is due with [facility] 9/14/2017  \  Had retinal screen done 8/10/2017 at [facility]        "

## 2017-08-28 NOTE — ASSESSMENT & PLAN NOTE
Chronic condition managed with current medical regimen  5/1/2017   Glucose 136  65 - 99 mg/dL Final     Glycohemoglobin 7.2  0.0 - 5.6 % Final   Comment:    Continue with diet management  Followed by [facility].

## 2017-11-03 ENCOUNTER — HOSPITAL ENCOUNTER (OUTPATIENT)
Dept: LAB | Facility: MEDICAL CENTER | Age: 67
End: 2017-11-03
Attending: NURSE PRACTITIONER
Payer: MEDICARE

## 2017-11-03 DIAGNOSIS — I10 ESSENTIAL HYPERTENSION: ICD-10-CM

## 2017-11-03 DIAGNOSIS — E11.42 WELL CONTROLLED TYPE 2 DIABETES MELLITUS WITH PERIPHERAL NEUROPATHY (HCC): ICD-10-CM

## 2017-11-03 DIAGNOSIS — E78.00 PURE HYPERCHOLESTEROLEMIA: ICD-10-CM

## 2017-11-03 LAB
ALBUMIN SERPL BCP-MCNC: 4.5 G/DL (ref 3.2–4.9)
ALBUMIN/GLOB SERPL: 1.7 G/DL
ALP SERPL-CCNC: 101 U/L (ref 30–99)
ALT SERPL-CCNC: 35 U/L (ref 2–50)
ANION GAP SERPL CALC-SCNC: 9 MMOL/L (ref 0–11.9)
AST SERPL-CCNC: 20 U/L (ref 12–45)
BILIRUB SERPL-MCNC: 0.7 MG/DL (ref 0.1–1.5)
BUN SERPL-MCNC: 18 MG/DL (ref 8–22)
CALCIUM SERPL-MCNC: 9.3 MG/DL (ref 8.5–10.5)
CHLORIDE SERPL-SCNC: 101 MMOL/L (ref 96–112)
CHOLEST SERPL-MCNC: 215 MG/DL (ref 100–199)
CO2 SERPL-SCNC: 25 MMOL/L (ref 20–33)
CREAT SERPL-MCNC: 0.8 MG/DL (ref 0.5–1.4)
CREAT UR-MCNC: 196 MG/DL
EST. AVERAGE GLUCOSE BLD GHB EST-MCNC: 229 MG/DL
GFR SERPL CREATININE-BSD FRML MDRD: >60 ML/MIN/1.73 M 2
GLOBULIN SER CALC-MCNC: 2.7 G/DL (ref 1.9–3.5)
GLUCOSE SERPL-MCNC: 187 MG/DL (ref 65–99)
HBA1C MFR BLD: 9.6 % (ref 0–5.6)
HDLC SERPL-MCNC: 42 MG/DL
LDLC SERPL CALC-MCNC: 130 MG/DL
MICROALBUMIN UR-MCNC: 2.9 MG/DL
MICROALBUMIN/CREAT UR: 15 MG/G (ref 0–30)
POTASSIUM SERPL-SCNC: 4 MMOL/L (ref 3.6–5.5)
PROT SERPL-MCNC: 7.2 G/DL (ref 6–8.2)
SODIUM SERPL-SCNC: 135 MMOL/L (ref 135–145)
TRIGL SERPL-MCNC: 215 MG/DL (ref 0–149)

## 2017-11-03 PROCEDURE — 80053 COMPREHEN METABOLIC PANEL: CPT

## 2017-11-03 PROCEDURE — 36415 COLL VENOUS BLD VENIPUNCTURE: CPT

## 2017-11-03 PROCEDURE — 83036 HEMOGLOBIN GLYCOSYLATED A1C: CPT | Mod: GA

## 2017-11-03 PROCEDURE — 82570 ASSAY OF URINE CREATININE: CPT

## 2017-11-03 PROCEDURE — 80061 LIPID PANEL: CPT

## 2017-11-03 PROCEDURE — 82043 UR ALBUMIN QUANTITATIVE: CPT

## 2017-11-10 ENCOUNTER — OFFICE VISIT (OUTPATIENT)
Dept: MEDICAL GROUP | Facility: PHYSICIAN GROUP | Age: 67
End: 2017-11-10
Payer: MEDICARE

## 2017-11-10 VITALS
SYSTOLIC BLOOD PRESSURE: 154 MMHG | BODY MASS INDEX: 26.52 KG/M2 | HEIGHT: 66 IN | OXYGEN SATURATION: 95 % | TEMPERATURE: 97.8 F | WEIGHT: 165 LBS | DIASTOLIC BLOOD PRESSURE: 88 MMHG | HEART RATE: 74 BPM

## 2017-11-10 DIAGNOSIS — F33.1 MODERATE EPISODE OF RECURRENT MAJOR DEPRESSIVE DISORDER (HCC): ICD-10-CM

## 2017-11-10 DIAGNOSIS — I10 ESSENTIAL HYPERTENSION: ICD-10-CM

## 2017-11-10 DIAGNOSIS — E11.42 WELL CONTROLLED TYPE 2 DIABETES MELLITUS WITH PERIPHERAL NEUROPATHY (HCC): ICD-10-CM

## 2017-11-10 DIAGNOSIS — F33.1 MAJOR DEPRESSIVE DISORDER, RECURRENT EPISODE, MODERATE (HCC): ICD-10-CM

## 2017-11-10 PROCEDURE — 99214 OFFICE O/P EST MOD 30 MIN: CPT | Performed by: NURSE PRACTITIONER

## 2017-11-10 RX ORDER — HYDROCHLOROTHIAZIDE 25 MG/1
25 TABLET ORAL DAILY
Qty: 90 TAB | Refills: 1 | Status: SHIPPED | OUTPATIENT
Start: 2017-11-10 | End: 2018-05-02 | Stop reason: SDUPTHER

## 2017-11-10 NOTE — PROGRESS NOTES
Chief Complaint   Patient presents with   • Diabetes     Lab results completed 11/3/17         This is a 67 y.o.female patient that presents today with the following:Follow-up visit, discuss acute and chronic conditions    Uncontrolled type 2 diabetes mellitus with complication, without long-term current use of insulin (CMS-HCC)  This is a chronic condition, currently uncontrolled. She previously had controlled his condition with diet and exercise, her last hemoglobin A1c in May 2017 was 7.2%. It is now over 9%. She states that she has been extremely stressed over personal issues including financial and relationship problems with family. She is also having increased stress since her   3 years ago and continues to struggle living alone. She admits to not eating very well and not been able to exercise. She is agreeable to starting medication today--but does not want to start insulin, we will have her start Janumet,  milligrams, one pill twice a day. She will follow-up with me in 3 months with labs before visit. I encouraged her to take her blood sugar daily and bring readings to her follow-up appointment. She cannot take ACE inhibitor due to allergy and at this point still continues to decline statin therapy. She understands the importance of proper foot care and annual eye exams. She [facility] [place]    Major depressive disorder, recurrent episode, moderate (CMS-HCC)  This is a chronic condition, currently uncontrolled. When she initially established care with me in May 2017, she was on 100 mg of Zoloft daily. This was controlling her symptoms well, however she admits to me today that she took herself off of this over the last couple of months as she did not feel she needed it. However she has had increased stress in her life related to financial issues as well as family issues. She is also still mourning the death of her  who passed 3 years  ago from cancer. She is quite tearful in the office today. She would like to get back on the Zoloft, this has been called in for her. I would like her to start at 50 mg daily for 1-2 weeks and then go up to 100 mg daily. She does adamantly deny suicidal and homicidal ideations, hallucinations, racing thoughts and flights of ideas. She declines referral to a therapist, but states she will think about it.    Hypertension  This is a chronic condition, currently suboptimally controlled. She is on amlodipine 10 mg daily. Her blood pressure today is 154/88 and she denies symptoms of hypertension. She cannot take ACE inhibitor is due to allergy. We'll have her start hydrochlorothiazide 25 mg in addition to the amlodipine. I would like her to return in one week for blood pressure check with MA. I did discuss with her the risks, benefits and side effects of the hydrochlorothiazide.      Hospital Outpatient Visit on 11/03/2017   Component Date Value   • Sodium 11/03/2017 135    • Potassium 11/03/2017 4.0    • Chloride 11/03/2017 101    • Co2 11/03/2017 25    • Anion Gap 11/03/2017 9.0    • Glucose 11/03/2017 187*   • Bun 11/03/2017 18    • Creatinine 11/03/2017 0.80    • Calcium 11/03/2017 9.3    • AST(SGOT) 11/03/2017 20    • ALT(SGPT) 11/03/2017 35    • Alkaline Phosphatase 11/03/2017 101*   • Total Bilirubin 11/03/2017 0.7    • Albumin 11/03/2017 4.5    • Total Protein 11/03/2017 7.2    • Globulin 11/03/2017 2.7    • A-G Ratio 11/03/2017 1.7    • Glycohemoglobin 11/03/2017 9.6*   • Est Avg Glucose 11/03/2017 229    • Creatinine, Urine 11/03/2017 196.00    • Microalbumin, Urine Rand* 11/03/2017 2.9    • Micro Alb Creat Ratio 11/03/2017 15    • Cholesterol,Tot 11/03/2017 215*   • Triglycerides 11/03/2017 215*   • HDL 11/03/2017 42    • LDL 11/03/2017 130*   • GFR If  11/03/2017 >60    • GFR If Non  Ameri* 11/03/2017 >60              Past Medical History:   Diagnosis Date   • Asthma    • Diabetes  mellitus type 2 in nonobese (CMS-HCC)    • History of mammogram     fibrocystic breast disease   • Hyperlipidemia    • Irritable bowel syndrome    • Pelvic exam     ASCUS, then had hysterectomy   • S/P colonoscopy        • Vitamin D deficiency        Past Surgical History:   Procedure Laterality Date   • SOLO BY LAPAROSCOPY     • CYSTECTOMY     • DILATION AND CURETTAGE      x4   • HYSTERECTOMY, TOTAL ABDOMINAL      took everything       Family History   Problem Relation Age of Onset   • Hypertension Mother    • Other Father      RA,  age 40       Ace inhibitors; Demerol; Fiorinal; Minipress [fd&c blue #1-fd&c red #40-prazosin]; Prednisone; Trilipix [choline fenofibrate]; and Vicodin [hydrocodone-acetaminophen]    Current Outpatient Prescriptions Ordered in [facility]   Medication Sig Dispense Refill   • Probiotic Product (PROBIOTIC-10) Cap Take  by mouth.     • sertraline (ZOLOFT) 50 MG Tab Take 2 Tabs by mouth every day. 180 Tab 1   • sitagliptan-metformin (JANUMET)  MG per tablet Take 1 Tab by mouth 2 times a day, with meals. 60 Tab 3   • hydrochlorothiazide (HYDRODIURIL) 25 MG Tab Take 1 Tab by mouth every day. 90 Tab 1   • pantoprazole (PROTONIX) 40 MG Tablet Delayed Response TAKE 1 TABLET BY MOUTH EVERY DAY 90 Tab 3   • amlodipine (NORVASC) 10 MG Tab Take 1 Tab by mouth every day. 90 Tab 3   • Cholecalciferol (VITAMIN D3) 5000 UNIT TABS Take 1,000 mg by mouth.       No current Epic-ordered facility-administered medications on file.        Constitutional ROS: No unexpected change in weight, No weakness, No unexplained fevers, sweats, or chills  Pulmonary ROS: No chronic cough, sputum, or hemoptysis, No shortness of breath, No recent change in breathing  Cardiovascular ROS: No chest pain, No edema, No palpitations, Positive for hypertension, per history of present illness  Gastrointestinal ROS: No abdominal pain, No nausea, vomiting, diarrhea, or constipation, no blood in  "stool  Musculoskeletal/Extremities ROS: No clubbing, No peripheral edema, No pain, redness or swelling on the joints  Neurologic ROS: Normal development, No seizures, No weakness  Psychiatric ROS: Positive for depression, per history of present illness  Endocrine ROS: Positive per history of present illness    Physical exam:  /88   Pulse 74   Temp 36.6 °C (97.8 °F)   Ht 1.676 m (5' 6\")   Wt 74.8 kg (165 lb)   SpO2 95%   BMI 26.63 kg/m²   General Appearance: Older female, alert, no distress, mildly overweight, well-groomed  Skin: Skin color, texture, turgor normal. No rashes or lesions.  Lungs: negative findings: normal respiratory rate and rhythm, lungs clear to auscultation  Heart: negative. RRR without murmur, gallop, or rubs.  No ectopy.  Abdomen: Abdomen soft, non-tender. BS normal. No masses,  No organomegaly  Musculoskeletal: negative findings: ROM of all joints is normal, no evidence of joint instability, strength normal, no deformities present  Neurologic: intact, oriented, mood appropriate, judgment intact. Cranial nerves II-12 grossly intact    Medical decision making/discussion: Patient is to return in one week for a blood pressure check with the MA. She will start hydrochlorothiazide, 25 mg daily. I will like her follow-up with me in 6 weeks, sooner if needed. She is going to have repeat labs done in 3 months. I would like her to restart Zoloft 100 mg daily. She is going to work on healthy diet, regular physical activity.      .Ting was seen today for diabetes.    Diagnoses and all orders for this visit:    Uncontrolled type 2 diabetes mellitus with complication, without long-term current use of insulin (CMS-HCC)  -     sitagliptan-metformin (JANUMET)  MG per tablet; Take 1 Tab by mouth 2 times a day, with meals.    Moderate episode of recurrent major depressive disorder (CMS-HCC)  -     sertraline (ZOLOFT) 50 MG Tab; Take 2 Tabs by mouth every day.    Essential hypertension  -     " hydrochlorothiazide (HYDRODIURIL) 25 MG Tab; Take 1 Tab by mouth every day.                Please note that this dictation was created using voice recognition software. I have made every reasonable attempt to correct obvious errors, but I expect that there are errors of grammar and possibly content that I did not discover before finalizing the note.

## 2017-11-10 NOTE — ASSESSMENT & PLAN NOTE
This is a chronic condition, currently uncontrolled. When she initially established care with me in May 2017, she was on 100 mg of Zoloft daily. This was controlling her symptoms well, however she admits to me today that she took herself off of this over the last couple of months as she did not feel she needed it. However she has had increased stress in her life related to financial issues as well as family issues. She is also still mourning the death of her  who passed 3 years ago from cancer. She is quite tearful in the office today. She would like to get back on the Zoloft, this has been called in for her. I would like her to start at 50 mg daily for 1-2 weeks and then go up to 100 mg daily. She does adamantly deny suicidal and homicidal ideations, hallucinations, racing thoughts and flights of ideas. She declines referral to a therapist, but states she will think about it.

## 2017-11-10 NOTE — LETTER
[facility]  DARBY JoensRKERRI.  [address] [place]  [place] [address]  Fax: [phone]   Authorization for Release/Disclosure of   Protected Health Information   Name: ITNG PERALES : 1950 SSN: [id number]   Address: [address] [place] [address] [place] [address] Phone:    [phone] (home)    I authorize the entity listed below to release/disclose the PHI below to:   [facility]/OLINDA Jones and OLINDA Jones   Provider or Entity Name:  Maria Eugenia   Address   City, State, [facility]   Phone:  [phone]    Fax:  [phone]   Reason for request: continuity of care   Information to be released:    [  ] LAST COLONOSCOPY,  including any PATH REPORT and follow-up  [  ] LAST FIT/COLOGUARD RESULT [  ] LAST DEXA  [  ] LAST MAMMOGRAM  [  ] LAST PAP  [  ] LAST LABS [ X  ] RETINA EXAM REPORT  [  ] IMMUNIZATION RECORDS  [ ] Release all info      [  ] Check here and initial the line next to each item to release ALL health information INCLUDING  _____ Care and treatment for drug and / or alcohol abuse  _____ HIV testing, infection status, or AIDS  _____ Genetic Testing    DATES OF SERVICE OR TIME PERIOD TO BE DISCLOSED: _____________  I understand and acknowledge that:  * This Authorization may be revoked at any time by you in writing, except if your health information has already been used or disclosed.  * Your health information that will be used or disclosed as a result of you signing this authorization could be re-disclosed by the recipient. If this occurs, your re-disclosed health information may no longer be protected by State or Federal laws.  * You may refuse to sign this Authorization. Your refusal will not affect your ability to obtain treatment.  * This Authorization becomes effective upon signing and will  on (date) __________.      If no date is indicated, this Authorization will  one (1) year from the signature date.    Name: Ting Nguyễn  Bri    Signature:   Date:     11/10/2017       PLEASE FAX REQUESTED RECORDS BACK TO: [phone]

## 2017-11-10 NOTE — PATIENT INSTRUCTIONS
Follow up in 1 week with MA for BP check    Follow up with me in 6 weeks, sooner if needed    Restart Zoloft at 1 pill daily for 1 month, then go to 2 pills daily    Can add OTC Zantac to Pepcid    Will have you start Janumet, twice daily

## 2017-11-10 NOTE — ASSESSMENT & PLAN NOTE
This is a chronic condition, currently uncontrolled. She previously had controlled his condition with diet and exercise, her last hemoglobin A1c in May 2017 was 7.2%. It is now over 9%. She states that she has been extremely stressed over personal issues including financial and relationship problems with family. She is also having increased stress since her   3 years ago and continues to struggle living alone. She admits to not eating very well and not been able to exercise. She is agreeable to starting medication today, we will have her start Janumet,  milligrams, one pill twice a day. She will follow-up with me in 3 months with labs before visit. I encouraged her to take her blood sugar daily and bring readings to her follow-up appointment. She cannot take ACE inhibitor due to allergy and at this point still continues to decline statin therapy. She understands the importance of proper foot care and annual eye exams. She states she recently had eye exam done at [facility] in [place].

## 2017-11-10 NOTE — ASSESSMENT & PLAN NOTE
This is a chronic condition, currently suboptimally controlled. She is on amlodipine 10 mg daily. Her blood pressure today is 154/88 and she denies symptoms of hypertension. She cannot take ACE inhibitor is due to allergy. We'll have her start hydrochlorothiazide 25 mg in addition to the amlodipine. I would like her to return in one week for blood pressure check with MA. I did discuss with her the risks, benefits and side effects of the hydrochlorothiazide.

## 2017-11-13 DIAGNOSIS — I10 ESSENTIAL HYPERTENSION: ICD-10-CM

## 2017-11-13 RX ORDER — AMLODIPINE BESYLATE 10 MG/1
10 TABLET ORAL
Qty: 90 TAB | Refills: 0 | Status: SHIPPED | OUTPATIENT
Start: 2017-11-13 | End: 2018-02-20 | Stop reason: SDUPTHER

## 2017-11-13 NOTE — TELEPHONE ENCOUNTER
Was the patient seen in the last year in this department? Yes     Does patient have an active prescription for medications requested? No     Received Request Via: Pharmacy      Pt met protocol?: Yes     LAST OV 11/10/2017    BP Readings from Last 1 Encounters:   11/10/17 154/88

## 2017-11-17 ENCOUNTER — NON-PROVIDER VISIT (OUTPATIENT)
Dept: MEDICAL GROUP | Facility: PHYSICIAN GROUP | Age: 67
End: 2017-11-17
Payer: MEDICARE

## 2017-11-17 ENCOUNTER — TELEPHONE (OUTPATIENT)
Dept: MEDICAL GROUP | Facility: PHYSICIAN GROUP | Age: 67
End: 2017-11-17

## 2017-11-17 VITALS — DIASTOLIC BLOOD PRESSURE: 88 MMHG | SYSTOLIC BLOOD PRESSURE: 134 MMHG

## 2017-11-17 RX ORDER — METFORMIN HYDROCHLORIDE EXTENDED-RELEASE TABLETS 1000 MG/1
500 TABLET, FILM COATED, EXTENDED RELEASE ORAL 2 TIMES DAILY
Qty: 180 TAB | Refills: 1 | Status: SHIPPED | OUTPATIENT
Start: 2017-11-17 | End: 2018-05-15 | Stop reason: SDUPTHER

## 2017-11-17 NOTE — TELEPHONE ENCOUNTER
Ting seen in clinic for BP Check today, 134 88.  Ting also stating she has sent my chart message regarding stopping her Janumet due to diarrhea.

## 2017-11-17 NOTE — PROGRESS NOTES
Ting Gregory is a 67 y.o. female here for a non-provider visit for BP Check    Vitals:    11/17/17 1145   BP: 134/88     If abnormal, was an in office provider notified today? Yes  Routed to PCP? Yes

## 2017-12-19 ENCOUNTER — OFFICE VISIT (OUTPATIENT)
Dept: MEDICAL GROUP | Facility: PHYSICIAN GROUP | Age: 67
End: 2017-12-19
Payer: MEDICARE

## 2017-12-19 VITALS
OXYGEN SATURATION: 95 % | HEART RATE: 68 BPM | WEIGHT: 158 LBS | SYSTOLIC BLOOD PRESSURE: 118 MMHG | TEMPERATURE: 98.5 F | DIASTOLIC BLOOD PRESSURE: 80 MMHG | BODY MASS INDEX: 25.39 KG/M2 | HEIGHT: 66 IN | RESPIRATION RATE: 16 BRPM

## 2017-12-19 DIAGNOSIS — I10 ESSENTIAL HYPERTENSION: ICD-10-CM

## 2017-12-19 DIAGNOSIS — K21.9 GASTROESOPHAGEAL REFLUX DISEASE, ESOPHAGITIS PRESENCE NOT SPECIFIED: ICD-10-CM

## 2017-12-19 PROCEDURE — 99214 OFFICE O/P EST MOD 30 MIN: CPT | Performed by: NURSE PRACTITIONER

## 2017-12-19 RX ORDER — PANTOPRAZOLE SODIUM 40 MG/1
40 TABLET, DELAYED RELEASE ORAL
Qty: 90 TAB | Refills: 3 | Status: SHIPPED | OUTPATIENT
Start: 2017-12-19 | End: 2018-12-21 | Stop reason: SDUPTHER

## 2017-12-20 NOTE — ASSESSMENT & PLAN NOTE
This is a chronic condition, stable and well controlled on pantoprazole 40 mg daily. She does need refills, these were called in for her. She states that she works on avoiding aggravating foods and activities.

## 2017-12-20 NOTE — ASSESSMENT & PLAN NOTE
This is a chronic condition, currently uncontrolled. She was last seen in early November to follow-up on labs. Her hemoglobin A1c was over 9%. She has been taking metformin extended release 500 mg twice daily. She has been very hesitant to take anything else for her diabetes and feels that she can make improvements with lifestyle modifications. She was started on Janumet, but she did not tolerate this medication. She brings in daily blood sugar readings, they appear to be quite stable. She is averaging in the 130s to 150s for fasting blood sugar. She cannot take ACE inhibitor and she continues to decline statin therapy. She will have labs done before she follows up with me in 3 months. I encouraged her continue with medication and regular physical activity and continued efforts towards maintaining her healthy weight. In fact, she is down nearly 10 pounds since her last visit with me in early November, she was congratulated for this.

## 2017-12-20 NOTE — PROGRESS NOTES
Chief Complaint   Patient presents with   • Diabetes     fv labs, refill protonix         This is a 67 y.o.female patient that presents today with the following:Follow-up visit    GERD (gastroesophageal reflux disease)  This is a chronic condition, stable and well controlled on pantoprazole 40 mg daily. She does need refills, these were called in for her. She states that she works on avoiding aggravating foods and activities.    Hypertension  This is a chronic condition, stable and fairly well-controlled on current medications including amlodipine 10 mg daily and hydrochlorothiazide 25 mg daily. Blood pressure today is 118/80 and she denies symptoms of hypertension. She is unable to take ACE inhibitor due to allergy. She is tolerating medications well with no significant bothersome side effects.    Uncontrolled type 2 diabetes mellitus with complication, without long-term current use of insulin (CMS-HCC)  This is a chronic condition, currently uncontrolled. She was last seen in early November to follow-up on labs. Her hemoglobin A1c was over 9% at that time. She has been taking metformin extended release 500 mg twice daily. She has been very hesitant to take anything else for her diabetes and feels that she can make improvements with lifestyle modifications. She was started on Janumet, but she did not tolerate this medication. She brings in daily blood sugar readings, they appear to be quite stable. She is averaging in the 130s to 150s for fasting blood sugar. She cannot take ACE inhibitor and she continues to decline statin therapy. She will have labs done before she follows up with me in 3 months. I encouraged her continue with medication and regular physical activity and continued efforts towards maintaining her healthy weight. In fact, she is down nearly 10 pounds since her last visit with me in early November, she was congratulated for this.      No visits with results within 1 Month(s) from this visit.    Latest known visit with results is:   Hospital Outpatient Visit on 2017   Component Date Value   • Sodium 2017 135    • Potassium 2017 4.0    • Chloride 2017 101    • Co2 2017 25    • Anion Gap 2017 9.0    • Glucose 2017 187*   • Bun 2017 18    • Creatinine 2017 0.80    • Calcium 2017 9.3    • AST(SGOT) 2017 20    • ALT(SGPT) 2017 35    • Alkaline Phosphatase 2017 101*   • Total Bilirubin 2017 0.7    • Albumin 2017 4.5    • Total Protein 2017 7.2    • Globulin 2017 2.7    • A-G Ratio 2017 1.7    • Glycohemoglobin 2017 9.6*   • Est Avg Glucose 2017 229    • Creatinine, Urine 2017 196.00    • Microalbumin, Urine Rand* 2017 2.9    • Micro Alb Creat Ratio 2017 15    • Cholesterol,Tot 2017 215*   • Triglycerides 2017 215*   • HDL 2017 42    • LDL 2017 130*   • GFR If  2017 >60    • GFR If Non  Ameri* 2017 >60          clinical course has been stable    Past Medical History:   Diagnosis Date   • Asthma    • Diabetes mellitus type 2 in nonobese (CMS-HCC)    • History of mammogram     fibrocystic breast disease   • Hyperlipidemia    • Irritable bowel syndrome    • Pelvic exam     ASCUS, then had hysterectomy   • S/P colonoscopy        • Vitamin D deficiency        Past Surgical History:   Procedure Laterality Date   • SOLO BY LAPAROSCOPY     • CYSTECTOMY     • DILATION AND CURETTAGE      x4   • HYSTERECTOMY, TOTAL ABDOMINAL      took everything       Family History   Problem Relation Age of Onset   • Hypertension Mother    • Other Father      RA,  age 40       Ace inhibitors; Demerol; Fiorinal; Minipress [fd&c blue #1-fd&c red #40-prazosin]; Prednisone; Trilipix [choline fenofibrate]; and Vicodin [hydrocodone-acetaminophen]    Current Outpatient Prescriptions Ordered in [facility]   Medication Sig Dispense Refill   •  "pantoprazole (PROTONIX) 40 MG Tablet Delayed Response Take 1 Tab by mouth every day. 90 Tab 3   • metformin ER 1000 MG TABLET SR 24 HR Take 500 mg by mouth 2 times a day. 180 Tab 1   • amlodipine (NORVASC) 10 MG Tab Take 1 Tab by mouth every day. 90 Tab 0   • Probiotic Product (PROBIOTIC-10) Cap Take  by mouth.     • sertraline (ZOLOFT) 50 MG Tab Take 2 Tabs by mouth every day. 180 Tab 1   • hydrochlorothiazide (HYDRODIURIL) 25 MG Tab Take 1 Tab by mouth every day. 90 Tab 1   • Cholecalciferol (VITAMIN D3) 5000 UNIT TABS Take 1,000 mg by mouth.       No current Epic-ordered facility-administered medications on file.        Constitutional ROS: No unexpected change in weight, No weakness, No unexplained fevers, sweats, or chills  Pulmonary ROS: No chronic cough, sputum, or hemoptysis, No shortness of breath, No recent change in breathing  Cardiovascular ROS: No chest pain, No edema, No palpitations, Positive for hypertension, controlled  Gastrointestinal ROS: No abdominal pain, No nausea, vomiting, diarrhea, or constipation, no blood in stool  Musculoskeletal/Extremities ROS: No clubbing, No peripheral edema, No pain, redness or swelling on the joints  Neurologic ROS: Normal development, No seizures, No weakness  Endocrine ROS: Positive per history of present illness    Physical exam:  /80   Pulse 68   Temp 36.9 °C (98.5 °F)   Resp 16   Ht 1.676 m (5' 6\")   Wt 71.7 kg (158 lb)   SpO2 95%   BMI 25.50 kg/m²   General Appearance: Older female, alert, no distress, well-nourished, well-groomed  Skin: Skin color, texture, turgor normal. No rashes or lesions.  Lungs: negative findings: normal respiratory rate and rhythm, normal effort  Musculoskeletal: negative findings: ROM of all joints is normal, no evidence of joint instability, strength normal, no deformities present  Neurologic: intact, oriented, mood appropriate, judgment intact. Cranial nerves II-12 grossly intact    Medical decision making/discussion: " Patient follow-up with me in March 2018, with labs done sometime in late February. She is to stay on her medications as prescribed and call for refills as needed. She was encouraged to continue with healthy lifestyle modifications including healthy diet, regular physical activity and continued efforts towards maintaining healthy weight.    Ting was seen today for diabetes.    Diagnoses and all orders for this visit:    Uncontrolled type 2 diabetes mellitus with complication, without long-term current use of insulin (CMS-[facility])    Essential hypertension    Gastroesophageal reflux disease, esophagitis presence not specified  -     pantoprazole (PROTONIX) 40 MG Tablet Delayed Response; Take 1 Tab by mouth every day.          Please note that this dictation was created using voice recognition software. I have made every reasonable attempt to correct obvious errors, but I expect that there are errors of grammar and possibly content that I did not discover before finalizing the note.

## 2017-12-20 NOTE — ASSESSMENT & PLAN NOTE
This is a chronic condition, stable and fairly well-controlled on current medications including amlodipine 10 mg daily and hydrochlorothiazide 25 mg daily. Blood pressure today is 118/80 and she denies symptoms of hypertension. She is unable to take ACE inhibitor due to allergy. She is tolerating medications well with no significant bothersome side effects.

## 2018-02-13 ENCOUNTER — HOSPITAL ENCOUNTER (OUTPATIENT)
Dept: LAB | Facility: MEDICAL CENTER | Age: 68
End: 2018-02-13
Attending: NURSE PRACTITIONER
Payer: MEDICARE

## 2018-02-13 LAB
ALBUMIN SERPL BCP-MCNC: 4.1 G/DL (ref 3.2–4.9)
ALBUMIN/GLOB SERPL: 1.5 G/DL
ALP SERPL-CCNC: 62 U/L (ref 30–99)
ALT SERPL-CCNC: 34 U/L (ref 2–50)
ANION GAP SERPL CALC-SCNC: 10 MMOL/L (ref 0–11.9)
AST SERPL-CCNC: 21 U/L (ref 12–45)
BILIRUB SERPL-MCNC: 0.7 MG/DL (ref 0.1–1.5)
BUN SERPL-MCNC: 20 MG/DL (ref 8–22)
CALCIUM SERPL-MCNC: 9.3 MG/DL (ref 8.5–10.5)
CHLORIDE SERPL-SCNC: 102 MMOL/L (ref 96–112)
CO2 SERPL-SCNC: 28 MMOL/L (ref 20–33)
CREAT SERPL-MCNC: 0.88 MG/DL (ref 0.5–1.4)
EST. AVERAGE GLUCOSE BLD GHB EST-MCNC: 148 MG/DL
GLOBULIN SER CALC-MCNC: 2.7 G/DL (ref 1.9–3.5)
GLUCOSE SERPL-MCNC: 110 MG/DL (ref 65–99)
HBA1C MFR BLD: 6.8 % (ref 0–5.6)
POTASSIUM SERPL-SCNC: 3.3 MMOL/L (ref 3.6–5.5)
PROT SERPL-MCNC: 6.8 G/DL (ref 6–8.2)
SODIUM SERPL-SCNC: 140 MMOL/L (ref 135–145)

## 2018-02-13 PROCEDURE — 36415 COLL VENOUS BLD VENIPUNCTURE: CPT

## 2018-02-13 PROCEDURE — 80053 COMPREHEN METABOLIC PANEL: CPT

## 2018-02-13 PROCEDURE — 83036 HEMOGLOBIN GLYCOSYLATED A1C: CPT

## 2018-02-19 DIAGNOSIS — I10 ESSENTIAL HYPERTENSION: ICD-10-CM

## 2018-02-19 RX ORDER — AMLODIPINE BESYLATE 10 MG/1
10 TABLET ORAL
Qty: 90 TAB | Refills: 0 | Status: CANCELLED | OUTPATIENT
Start: 2018-02-19

## 2018-02-20 ENCOUNTER — OFFICE VISIT (OUTPATIENT)
Dept: MEDICAL GROUP | Facility: PHYSICIAN GROUP | Age: 68
End: 2018-02-20
Payer: MEDICARE

## 2018-02-20 VITALS
HEIGHT: 66 IN | SYSTOLIC BLOOD PRESSURE: 112 MMHG | WEIGHT: 148 LBS | BODY MASS INDEX: 23.78 KG/M2 | OXYGEN SATURATION: 95 % | TEMPERATURE: 98.2 F | RESPIRATION RATE: 14 BRPM | HEART RATE: 74 BPM | DIASTOLIC BLOOD PRESSURE: 80 MMHG

## 2018-02-20 DIAGNOSIS — F33.1 MAJOR DEPRESSIVE DISORDER, RECURRENT EPISODE, MODERATE (HCC): ICD-10-CM

## 2018-02-20 DIAGNOSIS — E78.5 HYPERLIPIDEMIA, UNSPECIFIED HYPERLIPIDEMIA TYPE: ICD-10-CM

## 2018-02-20 DIAGNOSIS — I10 ESSENTIAL HYPERTENSION: ICD-10-CM

## 2018-02-20 PROCEDURE — 99214 OFFICE O/P EST MOD 30 MIN: CPT | Performed by: NURSE PRACTITIONER

## 2018-02-20 RX ORDER — AMLODIPINE BESYLATE 10 MG/1
10 TABLET ORAL
Qty: 90 TAB | Refills: 3 | Status: SHIPPED | OUTPATIENT
Start: 2018-02-20 | End: 2019-02-01 | Stop reason: SDUPTHER

## 2018-02-20 NOTE — ASSESSMENT & PLAN NOTE
This is a chronic condition, currently well controlled on current medications including metformin extended release 1000 mg twice daily. Her hemoglobin A1c was previously over 9.0% it is now down to 6.8%. She was congratulated for this. She has made major dietary changes, has increased her physical activity and is down over 10 pounds since her last visit. She has become highly motivated to make health changes. She is going to continue on this regimen and we will recheck labs again in 6 months. she is unable to tolerate Ace inhibitors and she continues to decline statin therapy. However her last lipid profile is as follows:  Component      Latest Ref Rng & Units 5/1/2017 11/3/2017           8:05 AM  8:00 AM   Cholesterol,Tot      100 - 199 mg/dL 260 (H) 215 (H)   Triglycerides      0 - 149 mg/dL 162 (H) 215 (H)   HDL      >=40 mg/dL 46 42   LDL      <100 mg/dL 182 (H) 130 (H)   She understands the importance proper foot care and annual exams. She is due for a retinal exam in August, this has hardly been scheduled for her.

## 2018-02-20 NOTE — PROGRESS NOTES
Chief Complaint   Patient presents with   • Labs Only     FV for lab results, Diabetes          This is a 67 y.o.female patient that presents today with the following:follow up, review labs, med refills    Uncontrolled type 2 diabetes mellitus with complication, without long-term current use of insulin (CMS-HCC)  This is a chronic condition, currently well controlled on current medications including metformin extended release 1000 mg twice daily. Her hemoglobin A1c was previously over 9.0% it is now down to 6.8%. She was congratulated for this. She has made major dietary changes, has increased her physical activity and is down over 10 pounds since her last visit. She has become highly motivated to make health changes. She is going to continue on this regimen and we will recheck labs again in 6 months. she is unable to tolerate Ace inhibitors and she continues to decline statin therapy. However her last lipid profile is as follows:  Component      Latest Ref Rng & Units 5/1/2017 11/3/2017           8:05 AM  8:00 AM   Cholesterol,Tot      100 - 199 mg/dL 260 (H) 215 (H)   Triglycerides      0 - 149 mg/dL 162 (H) 215 (H)   HDL      >=40 mg/dL 46 42   LDL      <100 mg/dL 182 (H) 130 (H)   She understands the importance proper foot care and annual exams. She is due for a retinal exam in August, this has hardly been scheduled for her.    Hyperlipidemia  See additional notes on type 2 diabetes. She will be due for labs again in 6 months, these have been ordered. She is to continue with lifestyle modifications and weight loss.    Hypertension  This is a chronic condition, stable and well-controlled on current medications including amlodipine 10 mg daily and hydrochlorothiazide 25 mg daily. Blood pressure today is 112/80 and she denies symptoms of hypertension. She is up-to-date with labs, but will be due again in 6 months.    Major depressive disorder, recurrent episode, moderate (CMS-[facility])  This is a chronic condition,  stable and well-controlled on sertraline 100 mg daily. She tolerates these medications well with no significant or bothersome side effects. She does not need refills at this time, but can call as needed. She will follow-up with me again in 6 months.      Hospital Outpatient Visit on 2018   Component Date Value   • Sodium 2018 140    • Potassium 2018 3.3*   • Chloride 2018 102    • Co2 2018 28    • Anion Gap 2018 10.0    • Glucose 2018 110*   • Bun 2018 20    • Creatinine 2018 0.88    • Calcium 2018 9.3    • AST(SGOT) 2018 21    • ALT(SGPT) 2018 34    • Alkaline Phosphatase 2018 62    • Total Bilirubin 2018 0.7    • Albumin 2018 4.1    • Total Protein 2018 6.8    • Globulin 2018 2.7    • A-G Ratio 2018 1.5    • Glycohemoglobin 2018 6.8*   • Est Avg Glucose 2018 148    • GFR If  2018 >60    • GFR If Non  Ameri* 2018 >60          clinical course has been stable    Past Medical History:   Diagnosis Date   • Asthma    • Diabetes mellitus type 2 in nonobese (CMS-HCC)    • History of mammogram     fibrocystic breast disease   • Hyperlipidemia    • Irritable bowel syndrome    • Pelvic exam     ASCUS, then had hysterectomy   • S/P colonoscopy        • Vitamin D deficiency        Past Surgical History:   Procedure Laterality Date   • SOLO BY LAPAROSCOPY     • CYSTECTOMY     • DILATION AND CURETTAGE      x4   • HYSTERECTOMY, TOTAL ABDOMINAL      took everything       Family History   Problem Relation Age of Onset   • Hypertension Mother    • Other Father      RA,  age 40       Ace inhibitors; Demerol; Fiorinal; Minipress [fd&c blue #1-fd&c red #40-prazosin]; Prednisone; Trilipix [choline fenofibrate]; and Vicodin [hydrocodone-acetaminophen]    Current Outpatient Prescriptions Ordered in [facility]   Medication Sig Dispense Refill   • amLODIPine (NORVASC) 10 MG Tab Take 1  "Tab by mouth every day. 90 Tab 3   • pantoprazole (PROTONIX) 40 MG Tablet Delayed Response Take 1 Tab by mouth every day. 90 Tab 3   • metformin ER 1000 MG TABLET SR 24 HR Take 500 mg by mouth 2 times a day. 180 Tab 1   • sertraline (ZOLOFT) 50 MG Tab Take 2 Tabs by mouth every day. 180 Tab 1   • hydrochlorothiazide (HYDRODIURIL) 25 MG Tab Take 1 Tab by mouth every day. 90 Tab 1   • Cholecalciferol (VITAMIN D3) 5000 UNIT TABS Take 1,000 mg by mouth.     • Probiotic Product (PROBIOTIC-10) Cap Take  by mouth.       No current Epic-ordered facility-administered medications on file.        Constitutional ROS: No unexpected change in weight, No weakness, No unexplained fevers, sweats, or chills  Pulmonary ROS: No chronic cough, sputum, or hemoptysis, No shortness of breath, No recent change in breathing  Cardiovascular ROS: No chest pain, No edema, No palpitations, Positive for hypertension and hyperlipidemia, per history of present illness  Gastrointestinal ROS: No abdominal pain, No nausea, vomiting, diarrhea, or constipation  Musculoskeletal/Extremities ROS: No clubbing, No peripheral edema, No pain, redness or swelling on the joints  Neurologic ROS: Normal development, No seizures, No weakness  Psychiatric ROS: Positive for anxiety and depression, per history of present illness  Endocrine ROS: Positive per history of present illness    Physical exam:  /80   Pulse 74   Temp 36.8 °C (98.2 °F)   Resp 14   Ht 1.676 m (5' 6\")   Wt 67.1 kg (148 lb)   SpO2 95%   BMI 23.89 kg/m²   General Appearance: Older female, alert, no distress, well-nourished, well-groomed  Skin: Skin color, texture, turgor normal. No rashes or lesions.  Lungs: negative findings: normal respiratory rate and rhythm, normal effort  Abdomen: Abdomen soft, non-tender. BS normal. No masses,  No organomegaly  Musculoskeletal: negative findings: ROM of all joints is normal, no evidence of joint instability, strength normal, no deformities " present  Neurologic: intact, oriented, mood appropriate, judgment intact. Cranial nerves II-12 grossly intact    Medical decision making/discussion: Patient follow-up with me again in 6 months with labs done before visit. She is to stay on the current dose of medications as prescribed and follow up call for refills as needed. She is to continue with healthy diet, regular physical activity and continued efforts was maintaining healthy weight.    Ting was seen today for labs only.    Diagnoses and all orders for this visit:    Hyperlipidemia, unspecified hyperlipidemia type  -     LIPID PROFILE; Future    Uncontrolled type 2 diabetes mellitus with complication, without long-term current use of insulin (CMS-HCC)  -     COMP METABOLIC PANEL; Future  -     HEMOGLOBIN A1C; Future    Essential hypertension  Comments:  controlled, on amlodipine  Orders:  -     amLODIPine (NORVASC) 10 MG Tab; Take 1 Tab by mouth every day.    Major depressive disorder, recurrent episode, moderate (CMS-HCC)          Please note that this dictation was created using voice recognition software. I have made every reasonable attempt to correct obvious errors, but I expect that there are errors of grammar and possibly content that I did not discover before finalizing the note.

## 2018-02-20 NOTE — ASSESSMENT & PLAN NOTE
This is a chronic condition, stable and well-controlled on sertraline 100 mg daily. She tolerates these medications well with no significant or bothersome side effects. She does not need refills at this time, but can call as needed. She will follow-up with me again in 6 months.

## 2018-02-20 NOTE — ASSESSMENT & PLAN NOTE
See additional notes on type 2 diabetes. She will be due for labs again in 6 months, these have been ordered. She is to continue with lifestyle modifications and weight loss.

## 2018-02-20 NOTE — ASSESSMENT & PLAN NOTE
This is a chronic condition, stable and well-controlled on current medications including amlodipine 10 mg daily and hydrochlorothiazide 25 mg daily. Blood pressure today is 112/80 and she denies symptoms of hypertension. She is up-to-date with labs, but will be due again in 6 months.

## 2018-02-27 NOTE — TELEPHONE ENCOUNTER
From: Ting Gregory  Sent: 2/19/2018 7:33 AM PST  Subject: Medication Renewal Request    Ting Gregory would like a refill of the following medications:     amlodipine (NORVASC) 10 MG Tab [Iva Nelson, A.P.R.N.]    Preferred pharmacy: [id number] - [place], [place] - [address]

## 2018-05-02 DIAGNOSIS — I10 ESSENTIAL HYPERTENSION: ICD-10-CM

## 2018-05-02 NOTE — TELEPHONE ENCOUNTER
Was the patient seen in the last year in this department? Yes   Last appointment 2/20/18  Does patient have an active prescription for medications requested? Yes     Received Request Via: Patient

## 2018-05-03 RX ORDER — HYDROCHLOROTHIAZIDE 25 MG/1
25 TABLET ORAL DAILY
Qty: 90 TAB | Refills: 1 | Status: SHIPPED | OUTPATIENT
Start: 2018-05-03 | End: 2018-11-02 | Stop reason: SDUPTHER

## 2018-05-14 ENCOUNTER — PATIENT MESSAGE (OUTPATIENT)
Dept: MEDICAL GROUP | Facility: PHYSICIAN GROUP | Age: 68
End: 2018-05-14

## 2018-05-14 NOTE — TELEPHONE ENCOUNTER
----- Message from Ting Gregory sent at 5/14/2018  7:24 AM PDT -----  Regarding: Prescription Question  Contact: [phone]  Need a refill for Metformin sent to [facility]. Thank you. For some reason your refill site isn't working.

## 2018-05-15 RX ORDER — METFORMIN HYDROCHLORIDE EXTENDED-RELEASE TABLETS 1000 MG/1
TABLET, FILM COATED, EXTENDED RELEASE ORAL 2 TIMES DAILY
Status: CANCELLED
Start: 2018-05-15

## 2018-05-15 NOTE — TELEPHONE ENCOUNTER
**CONSIDER CHANGING TABLET TO 500mg FROM 1000MG**  Was the patient seen in the last year in this department? Yes     Does patient have an active prescription for medications requested? No     Received Request Via: Pharmacy      Pt met protocol?: Yes    LAST OV 02/20/2018      Lab Results  Component Value Date/Time   HBA1C 6.8 (H) 02/13/2018 0758       Lab Results  Component Value Date/Time   AVGLUC 148 02/13/2018 0758       Lab Results  Component Value Date/Time   CHOLSTRLTOT 215 (H) 11/03/2017 0800       Lab Results  Component Value Date/Time   TRIGLYCERIDE 215 (H) 11/03/2017 0800       Lab Results  Component Value Date/Time   HDL 42 11/03/2017 0800       Lab Results  Component Value Date/Time    (H) 11/03/2017 0800

## 2018-05-16 RX ORDER — METFORMIN HYDROCHLORIDE 500 MG/1
1000 TABLET, EXTENDED RELEASE ORAL 2 TIMES DAILY
Qty: 360 TAB | Refills: 1 | Status: SHIPPED | OUTPATIENT
Start: 2018-05-16 | End: 2018-05-17 | Stop reason: SDUPTHER

## 2018-05-16 NOTE — TELEPHONE ENCOUNTER
Iva,  Note from 2/18 states :  Uncontrolled type 2 diabetes mellitus with complication, without long-term current use of insulin (CMS-[facility])  This is a chronic condition, currently well controlled on current medications including metformin extended release 1000 mg twice daily.  But 500 mg bid was sent to pharmacy. Would you like to send a new rx for 1000 bid ?  Thanks

## 2018-05-17 ENCOUNTER — TELEPHONE (OUTPATIENT)
Dept: MEDICAL GROUP | Facility: PHYSICIAN GROUP | Age: 68
End: 2018-05-17

## 2018-05-17 RX ORDER — METFORMIN HYDROCHLORIDE 500 MG/1
500 TABLET, EXTENDED RELEASE ORAL 2 TIMES DAILY
Qty: 180 TAB | Refills: 3 | Status: SHIPPED | OUTPATIENT
Start: 2018-05-17 | End: 2019-02-20 | Stop reason: SDUPTHER

## 2018-05-18 NOTE — TELEPHONE ENCOUNTER
Iva,   Pharmacy called regarding metformin ER prescription  Pt was taking 500 ER bid, they got a refill for 1000 bid and wanted to clarify.  Pharmacy said pt did not know what she is taking. Your note mentions 1000 bid but last note did not state any increase from 500 bid.   I did a Rx for 500 ER bid as the pharmacist said that's the only way she's been taking it and A1c is improving.  But if pt should be on 500 two tabs bid please put a new Rx in.   Garcia

## 2018-06-13 ENCOUNTER — PATIENT MESSAGE (OUTPATIENT)
Dept: MEDICAL GROUP | Facility: PHYSICIAN GROUP | Age: 68
End: 2018-06-13

## 2018-06-13 DIAGNOSIS — F33.1 MODERATE EPISODE OF RECURRENT MAJOR DEPRESSIVE DISORDER (HCC): ICD-10-CM

## 2018-06-13 NOTE — TELEPHONE ENCOUNTER
Iva- Pt emailed stating that she felt better on 1 tablet daily instead of 2. You have upcoming appt with pt. Rewrote for you to sign if you agree.

## 2018-06-13 NOTE — TELEPHONE ENCOUNTER
----- Message from Ting Gregory sent at 6/13/2018  6:37 AM PDT -----  Regarding: Prescription Question  Contact: [phone]  Need a refill of Zoloft please. Reduced the dosage several months ago to one tablet because two tablets was making me more depressed. Feel better with one. Please send to [facility] here in [place]. Thank you. See you in August. Could you please let me know this has been sent in?

## 2018-08-13 ENCOUNTER — HOSPITAL ENCOUNTER (OUTPATIENT)
Dept: LAB | Facility: MEDICAL CENTER | Age: 68
End: 2018-08-13
Attending: NURSE PRACTITIONER
Payer: MEDICARE

## 2018-08-13 DIAGNOSIS — E78.5 HYPERLIPIDEMIA, UNSPECIFIED HYPERLIPIDEMIA TYPE: ICD-10-CM

## 2018-08-13 LAB
ALBUMIN SERPL BCP-MCNC: 4.3 G/DL (ref 3.2–4.9)
ALBUMIN/GLOB SERPL: 1.5 G/DL
ALP SERPL-CCNC: 55 U/L (ref 30–99)
ALT SERPL-CCNC: 22 U/L (ref 2–50)
ANION GAP SERPL CALC-SCNC: 11 MMOL/L (ref 0–11.9)
AST SERPL-CCNC: 17 U/L (ref 12–45)
BILIRUB SERPL-MCNC: 0.7 MG/DL (ref 0.1–1.5)
BUN SERPL-MCNC: 20 MG/DL (ref 8–22)
CALCIUM SERPL-MCNC: 9.5 MG/DL (ref 8.5–10.5)
CHLORIDE SERPL-SCNC: 103 MMOL/L (ref 96–112)
CHOLEST SERPL-MCNC: 235 MG/DL (ref 100–199)
CO2 SERPL-SCNC: 25 MMOL/L (ref 20–33)
CREAT SERPL-MCNC: 0.88 MG/DL (ref 0.5–1.4)
EST. AVERAGE GLUCOSE BLD GHB EST-MCNC: 151 MG/DL
GLOBULIN SER CALC-MCNC: 2.8 G/DL (ref 1.9–3.5)
GLUCOSE SERPL-MCNC: 122 MG/DL (ref 65–99)
HBA1C MFR BLD: 6.9 % (ref 0–5.6)
HDLC SERPL-MCNC: 45 MG/DL
LDLC SERPL CALC-MCNC: 146 MG/DL
POTASSIUM SERPL-SCNC: 3.9 MMOL/L (ref 3.6–5.5)
PROT SERPL-MCNC: 7.1 G/DL (ref 6–8.2)
SODIUM SERPL-SCNC: 139 MMOL/L (ref 135–145)
TRIGL SERPL-MCNC: 222 MG/DL (ref 0–149)

## 2018-08-13 PROCEDURE — 36415 COLL VENOUS BLD VENIPUNCTURE: CPT

## 2018-08-13 PROCEDURE — 80053 COMPREHEN METABOLIC PANEL: CPT

## 2018-08-13 PROCEDURE — 80061 LIPID PANEL: CPT

## 2018-08-13 PROCEDURE — 83036 HEMOGLOBIN GLYCOSYLATED A1C: CPT | Mod: GA

## 2018-08-20 ENCOUNTER — OFFICE VISIT (OUTPATIENT)
Dept: MEDICAL GROUP | Facility: PHYSICIAN GROUP | Age: 68
End: 2018-08-20
Payer: MEDICARE

## 2018-08-20 VITALS
OXYGEN SATURATION: 97 % | HEART RATE: 82 BPM | TEMPERATURE: 98.1 F | BODY MASS INDEX: 25.07 KG/M2 | HEIGHT: 66 IN | RESPIRATION RATE: 16 BRPM | WEIGHT: 156 LBS | DIASTOLIC BLOOD PRESSURE: 76 MMHG | SYSTOLIC BLOOD PRESSURE: 136 MMHG

## 2018-08-20 DIAGNOSIS — F33.1 MAJOR DEPRESSIVE DISORDER, RECURRENT EPISODE, MODERATE (HCC): ICD-10-CM

## 2018-08-20 DIAGNOSIS — E78.2 MIXED HYPERLIPIDEMIA: ICD-10-CM

## 2018-08-20 DIAGNOSIS — I10 ESSENTIAL HYPERTENSION: ICD-10-CM

## 2018-08-20 DIAGNOSIS — E11.8 TYPE 2 DIABETES MELLITUS WITH COMPLICATION, WITHOUT LONG-TERM CURRENT USE OF INSULIN (HCC): ICD-10-CM

## 2018-08-20 DIAGNOSIS — H25.093 AGE-RELATED INCIPIENT CATARACT OF BOTH EYES: ICD-10-CM

## 2018-08-20 PROBLEM — H25.9 AGE-RELATED CATARACT OF BOTH EYES: Status: ACTIVE | Noted: 2018-08-20

## 2018-08-20 PROCEDURE — 99214 OFFICE O/P EST MOD 30 MIN: CPT | Performed by: NURSE PRACTITIONER

## 2018-08-20 ASSESSMENT — PATIENT HEALTH QUESTIONNAIRE - PHQ9: CLINICAL INTERPRETATION OF PHQ2 SCORE: 0

## 2018-08-20 NOTE — ASSESSMENT & PLAN NOTE
This is a chronic condition, stable and well controlled on sertraline 50 mg daily.  We had increased her dose temporarily as she felt like her symptoms were worsening, but she did not tolerate 100 mg daily and is dropped back down to 50 mg and doing well.

## 2018-08-20 NOTE — ASSESSMENT & PLAN NOTE
Patient has upcoming cataract surgery scheduled.  She is closely followed by ophthalmology.  She did have recent exam, will request his records.

## 2018-08-20 NOTE — ASSESSMENT & PLAN NOTE
This is a chronic condition, stable and fairly well controlled on current medications including amlodipine 10 mg daily and hydrochlorothiazide 25 mg daily.  Her blood pressure today is 136/76 and she denies symptoms of hypertension.  She does not need refills, but can call as needed.  She is follow-up with me again in 6 months.

## 2018-08-20 NOTE — ASSESSMENT & PLAN NOTE
This is a chronic condition, stable and fairly well controlled with metformin extended release 500 mg twice daily.  Her hemoglobin A1c is 6.9%, it was 6.8% about 6 months ago.  However, back in November it was over 9 so she has made significant improvements.  She is unable to take ACE inhibitor and she continues to decline statin.  She admits that she does not like to make a lot of changes to her medication regimen.  She would like to just continue with the Metformin as prescribed and she will continue to make dietary changes.  She is due for monofilament exam today, this was done.  She recently had eye exam, we will await those results.

## 2018-08-20 NOTE — PROGRESS NOTES
Chief Complaint   Patient presents with   • Diabetes     fv labs         This is a 68 y.o.female patient that presents today with the following: Follow-up, review labs    Type 2 diabetes mellitus with complication, without long-term current use of insulin (HCC)  This is a chronic condition, stable and fairly well controlled with metformin extended release 500 mg twice daily.  Her hemoglobin A1c is 6.9%, it was 6.8% about 6 months ago.  However, back in November it was over 9 so she has made significant improvements.  She is unable to take ACE inhibitor and she continues to decline statin.  She admits that she does not like to make a lot of changes to her medication regimen.  She would like to just continue with the Metformin as prescribed and she will continue to make dietary changes.  She is due for monofilament exam today, this was done.  She recently had eye exam, we will await those results.    Hyperlipidemia  This is a chronic condition, controlled with lifestyle modifications.  Her lipid profile is as follows:  Component      Latest Ref Rng & Units 11/3/2017 8/13/2018           8:00 AM  7:45 AM   Cholesterol,Tot      100 - 199 mg/dL 215 (H) 235 (H)   Triglycerides      0 - 149 mg/dL 215 (H) 222 (H)   HDL      >=40 mg/dL 42 45   LDL      <100 mg/dL 130 (H) 146 (H)     This has worsened, she also continues to decline any medications for cholesterol she is simply does not tolerate them.  She understands that she is at increased risk for cardiovascular event in the next 10 years.  The 10-year ASCVD risk score (Bridgerana SOLOMON Jr., et al., 2013) is: 21.6%    Values used to calculate the score:      Age: 68 years      Sex: Female      Is Non- : No      Diabetic: Yes      Tobacco smoker: No      Systolic Blood Pressure: 136 mmHg      Is BP treated: Yes      HDL Cholesterol: 45 mg/dL      Total Cholesterol: 235 mg/dL      Hypertension  This is a chronic condition, stable and fairly well controlled on  current medications including amlodipine 10 mg daily and hydrochlorothiazide 25 mg daily.  Her blood pressure today is 136/76 and she denies symptoms of hypertension.  She does not need refills, but can call as needed.  She is follow-up with me again in 6 months.    Major depressive disorder, recurrent episode, moderate (CMS-HCC)  This is a chronic condition, stable and well controlled on sertraline 50 mg daily.  We had increased her dose temporarily as she felt like her symptoms were worsening, but she did not tolerate 100 mg daily and is dropped back down to 50 mg and doing well.    Age-related cataract of both eyes  Patient has upcoming cataract surgery scheduled.  She is closely followed by ophthalmology.  She did have recent exam, will request his records.      Hospital Outpatient Visit on 08/13/2018   Component Date Value   • Sodium 08/13/2018 139    • Potassium 08/13/2018 3.9    • Chloride 08/13/2018 103    • Co2 08/13/2018 25    • Anion Gap 08/13/2018 11.0    • Glucose 08/13/2018 122*   • Bun 08/13/2018 20    • Creatinine 08/13/2018 0.88    • Calcium 08/13/2018 9.5    • AST(SGOT) 08/13/2018 17    • ALT(SGPT) 08/13/2018 22    • Alkaline Phosphatase 08/13/2018 55    • Total Bilirubin 08/13/2018 0.7    • Albumin 08/13/2018 4.3    • Total Protein 08/13/2018 7.1    • Globulin 08/13/2018 2.8    • A-G Ratio 08/13/2018 1.5    • Glycohemoglobin 08/13/2018 6.9*   • Est Avg Glucose 08/13/2018 151    • Cholesterol,Tot 08/13/2018 235*   • Triglycerides 08/13/2018 222*   • HDL 08/13/2018 45    • LDL 08/13/2018 146*   • GFR If  08/13/2018 >60    • GFR If Non  Ameri* 08/13/2018 >60          clinical course has been stable    Past Medical History:   Diagnosis Date   • Asthma    • Diabetes mellitus type 2 in nonobese (HCC)    • History of mammogram     fibrocystic breast disease   • Hyperlipidemia    • Irritable bowel syndrome    • Pelvic exam     ASCUS, then had hysterectomy   • S/P colonoscopy         • Vitamin D deficiency        Past Surgical History:   Procedure Laterality Date   • SOLO BY LAPAROSCOPY     • CYSTECTOMY     • DILATION AND CURETTAGE      x4   • HYSTERECTOMY, TOTAL ABDOMINAL      took everything       Family History   Problem Relation Age of Onset   • Hypertension Mother    • Other Father         RA,  age 40       Ace inhibitors; Demerol; Fiorinal; Minipress [fd&c blue #1-fd&c red #40-prazosin]; Prednisone; Trilipix [choline fenofibrate]; and Vicodin [hydrocodone-acetaminophen]    Current Outpatient Prescriptions Ordered in [facility]   Medication Sig Dispense Refill   • sertraline (ZOLOFT) 50 MG Tab Take 1 Tab by mouth every day. 90 Tab 0   • metFORMIN ER (GLUCOPHAGE XR) 500 MG TABLET SR 24 HR Take 1 Tab by mouth 2 times a day. 180 Tab 3   • hydroCHLOROthiazide (HYDRODIURIL) 25 MG Tab Take 1 Tab by mouth every day. 90 Tab 1   • amLODIPine (NORVASC) 10 MG Tab Take 1 Tab by mouth every day. 90 Tab 3   • pantoprazole (PROTONIX) 40 MG Tablet Delayed Response Take 1 Tab by mouth every day. 90 Tab 3   • Probiotic Product (PROBIOTIC-10) Cap Take  by mouth.     • Cholecalciferol (VITAMIN D3) 5000 UNIT TABS Take 1,000 mg by mouth.       No current Epic-ordered facility-administered medications on file.        Constitutional ROS: No unexpected change in weight, No weakness, No unexplained fevers, sweats, or chills  Eyes ROS: Positive per HPI  Pulmonary ROS: No chronic cough, sputum, or hemoptysis, No shortness of breath, No recent change in breathing  Cardiovascular ROS: No chest pain, No edema, No palpitations, Positive for hypertension and hyperlipidemia  Gastrointestinal ROS: No abdominal pain, No nausea, vomiting, diarrhea, or constipation  Musculoskeletal/Extremities ROS: No clubbing, No peripheral edema, No pain, redness or swelling on the joints  Neurologic ROS: Normal development, No seizures, No weakness  Psych ROS: Positive per HPI  Endocrine ROS: Positive per HPI    Physical exam:  BP  "136/76   Pulse 82   Temp 36.7 °C (98.1 °F)   Resp 16   Ht 1.676 m (5' 6\")   Wt 70.8 kg (156 lb)   SpO2 97%   BMI 25.18 kg/m²    General Appearance: Older female, alert, no distress, well-nourished, well-groomed  Skin: Skin color, texture, turgor normal. No rashes or lesions.  Lungs: negative findings: normal respiratory rate and rhythm, lungs clear to auscultation  Heart: negative. RRR without murmur, gallop, or rubs.  No ectopy.  Abdomen: Abdomen soft, non-tender. BS normal. No masses,  No organomegaly  Musculoskeletal: negative findings: ROM of all joints is normal, strength normal, no deformities present  Neurologic: intact, CN II through XII grossly intact    Medical decision making/discussion: pt to take meds as prescribed and call for refills as needed. She is to work on healthy diet, regular exercise and maintaining healthy weight. Encouraged pt to update health maintenance exams, but she continues to decline despite known risks of not doing.  She is to follow up and have labs done before visit.    Ting was seen today for diabetes.    Diagnoses and all orders for this visit:    Type 2 diabetes mellitus with complication, without long-term current use of insulin (HCC)  -     Diabetic Monofilament Lower Extremity Exam  -     HEMOGLOBIN A1C; Future  -     LIPID PROFILE; Future  -     MICROALBUMIN CREAT RATIO URINE; Future    Mixed hyperlipidemia  -     LIPID PROFILE; Future    Essential hypertension  -     LIPID PROFILE; Future    Age-related incipient cataract of both eyes    Major depressive disorder, recurrent episode, moderate (CMS-HCC)          Please note that this dictation was created using voice recognition software. I have made every reasonable attempt to correct obvious errors, but I expect that there are errors of grammar and possibly content that I did not discover before finalizing the note.        "

## 2018-08-20 NOTE — ASSESSMENT & PLAN NOTE
This is a chronic condition, controlled with lifestyle modifications.  Her lipid profile is as follows:  Component      Latest Ref Rng & Units 11/3/2017 8/13/2018           8:00 AM  7:45 AM   Cholesterol,Tot      100 - 199 mg/dL 215 (H) 235 (H)   Triglycerides      0 - 149 mg/dL 215 (H) 222 (H)   HDL      >=40 mg/dL 42 45   LDL      <100 mg/dL 130 (H) 146 (H)     This has worsened, she also continues to decline any medications for cholesterol she is simply does not tolerate them.  She understands that she is at increased risk for cardiovascular event in the next 10 years.  The 10-year ASCVD risk score (Bridgerana SOLOMON Jr., et al., 2013) is: 21.6%    Values used to calculate the score:      Age: 68 years      Sex: Female      Is Non- : No      Diabetic: Yes      Tobacco smoker: No      Systolic Blood Pressure: 136 mmHg      Is BP treated: Yes      HDL Cholesterol: 45 mg/dL      Total Cholesterol: 235 mg/dL

## 2018-11-02 DIAGNOSIS — I10 ESSENTIAL HYPERTENSION: ICD-10-CM

## 2018-11-02 RX ORDER — HYDROCHLOROTHIAZIDE 25 MG/1
25 TABLET ORAL DAILY
Qty: 90 TAB | Refills: 0 | Status: SHIPPED | OUTPATIENT
Start: 2018-11-02 | End: 2019-02-01 | Stop reason: SDUPTHER

## 2018-11-02 NOTE — TELEPHONE ENCOUNTER
Was the patient seen in the last year in this department? Yes    Does patient have an active prescription for medications requested? No     Received Request Via: Pharmacy      Pt met protocol?: Yes pt last ov 8/2018   BP Readings from Last 1 Encounters:   08/20/18 136/76

## 2018-12-15 DIAGNOSIS — F33.1 MODERATE EPISODE OF RECURRENT MAJOR DEPRESSIVE DISORDER (HCC): ICD-10-CM

## 2018-12-21 DIAGNOSIS — K21.9 GASTROESOPHAGEAL REFLUX DISEASE, ESOPHAGITIS PRESENCE NOT SPECIFIED: ICD-10-CM

## 2018-12-21 RX ORDER — PANTOPRAZOLE SODIUM 40 MG/1
40 TABLET, DELAYED RELEASE ORAL
Qty: 90 TAB | Refills: 1 | Status: SHIPPED | OUTPATIENT
Start: 2018-12-21 | End: 2019-06-21 | Stop reason: SDUPTHER

## 2019-01-28 DIAGNOSIS — F33.1 MAJOR DEPRESSIVE DISORDER, RECURRENT EPISODE, MODERATE (HCC): ICD-10-CM

## 2019-01-28 RX ORDER — BUPROPION HYDROCHLORIDE 150 MG/1
150 TABLET, EXTENDED RELEASE ORAL DAILY
Qty: 90 TAB | Refills: 1 | Status: SHIPPED | OUTPATIENT
Start: 2019-01-28 | End: 2019-07-22 | Stop reason: SDUPTHER

## 2019-02-01 DIAGNOSIS — I10 ESSENTIAL HYPERTENSION: ICD-10-CM

## 2019-02-01 RX ORDER — AMLODIPINE BESYLATE 10 MG/1
10 TABLET ORAL
Qty: 90 TAB | Refills: 0 | Status: SHIPPED | OUTPATIENT
Start: 2019-02-01 | End: 2019-04-23 | Stop reason: SDUPTHER

## 2019-02-01 RX ORDER — HYDROCHLOROTHIAZIDE 25 MG/1
25 TABLET ORAL DAILY
Qty: 90 TAB | Refills: 0 | Status: SHIPPED | OUTPATIENT
Start: 2019-02-01 | End: 2019-04-23 | Stop reason: SDUPTHER

## 2019-02-01 NOTE — TELEPHONE ENCOUNTER
Was the patient seen in the last year in this department? Yes    Does patient have an active prescription for medications requested? No     Received Request Via: Pharmacy    Pt met protocol?: Yes     Last OV 08/2018    BP Readings from Last 1 Encounters:   08/20/18 136/76

## 2019-02-12 ENCOUNTER — HOSPITAL ENCOUNTER (OUTPATIENT)
Dept: LAB | Facility: MEDICAL CENTER | Age: 69
End: 2019-02-12
Attending: NURSE PRACTITIONER
Payer: MEDICARE

## 2019-02-12 DIAGNOSIS — E11.8 TYPE 2 DIABETES MELLITUS WITH COMPLICATION, WITHOUT LONG-TERM CURRENT USE OF INSULIN (HCC): ICD-10-CM

## 2019-02-12 DIAGNOSIS — E78.2 MIXED HYPERLIPIDEMIA: ICD-10-CM

## 2019-02-12 DIAGNOSIS — I10 ESSENTIAL HYPERTENSION: ICD-10-CM

## 2019-02-12 LAB
CHOLEST SERPL-MCNC: 228 MG/DL (ref 100–199)
CREAT UR-MCNC: 427 MG/DL
EST. AVERAGE GLUCOSE BLD GHB EST-MCNC: 163 MG/DL
HBA1C MFR BLD: 7.3 % (ref 0–5.6)
HDLC SERPL-MCNC: 44 MG/DL
LDLC SERPL CALC-MCNC: 141 MG/DL
MICROALBUMIN UR-MCNC: 2.5 MG/DL
MICROALBUMIN/CREAT UR: 6 MG/G (ref 0–30)
TRIGL SERPL-MCNC: 214 MG/DL (ref 0–149)

## 2019-02-12 PROCEDURE — 36415 COLL VENOUS BLD VENIPUNCTURE: CPT | Mod: GA

## 2019-02-12 PROCEDURE — 83036 HEMOGLOBIN GLYCOSYLATED A1C: CPT | Mod: GA

## 2019-02-12 PROCEDURE — 80061 LIPID PANEL: CPT

## 2019-02-12 PROCEDURE — 82043 UR ALBUMIN QUANTITATIVE: CPT

## 2019-02-12 PROCEDURE — 82570 ASSAY OF URINE CREATININE: CPT

## 2019-02-20 ENCOUNTER — OFFICE VISIT (OUTPATIENT)
Dept: MEDICAL GROUP | Facility: PHYSICIAN GROUP | Age: 69
End: 2019-02-20
Payer: MEDICARE

## 2019-02-20 VITALS
TEMPERATURE: 98.2 F | HEIGHT: 66 IN | SYSTOLIC BLOOD PRESSURE: 122 MMHG | DIASTOLIC BLOOD PRESSURE: 80 MMHG | WEIGHT: 158 LBS | OXYGEN SATURATION: 96 % | BODY MASS INDEX: 25.39 KG/M2 | HEART RATE: 76 BPM | RESPIRATION RATE: 16 BRPM

## 2019-02-20 DIAGNOSIS — E11.8 TYPE 2 DIABETES MELLITUS WITH COMPLICATION, WITHOUT LONG-TERM CURRENT USE OF INSULIN (HCC): ICD-10-CM

## 2019-02-20 DIAGNOSIS — E78.2 MIXED HYPERLIPIDEMIA: ICD-10-CM

## 2019-02-20 DIAGNOSIS — H25.093 AGE-RELATED INCIPIENT CATARACT OF BOTH EYES: ICD-10-CM

## 2019-02-20 DIAGNOSIS — H40.009 PREGLAUCOMA, UNSPECIFIED LATERALITY: ICD-10-CM

## 2019-02-20 DIAGNOSIS — I10 ESSENTIAL HYPERTENSION: ICD-10-CM

## 2019-02-20 PROCEDURE — 99214 OFFICE O/P EST MOD 30 MIN: CPT | Performed by: NURSE PRACTITIONER

## 2019-02-20 RX ORDER — METFORMIN HYDROCHLORIDE 500 MG/1
1000 TABLET, EXTENDED RELEASE ORAL 2 TIMES DAILY
Qty: 360 TAB | Refills: 3 | Status: SHIPPED | OUTPATIENT
Start: 2019-02-20 | End: 2019-07-23 | Stop reason: SDUPTHER

## 2019-02-20 RX ORDER — SERTRALINE HYDROCHLORIDE 25 MG/1
25 TABLET, FILM COATED ORAL DAILY
COMMUNITY
End: 2019-08-20

## 2019-02-20 ASSESSMENT — PATIENT HEALTH QUESTIONNAIRE - PHQ9
SUM OF ALL RESPONSES TO PHQ QUESTIONS 1-9: 0
SUM OF ALL RESPONSES TO PHQ9 QUESTIONS 1 AND 2: 0
1. LITTLE INTEREST OR PLEASURE IN DOING THINGS: NOT AT ALL
7. TROUBLE CONCENTRATING ON THINGS, SUCH AS READING THE NEWSPAPER OR WATCHING TELEVISION: NOT AT ALL
5. POOR APPETITE OR OVEREATING: NOT AT ALL
6. FEELING BAD ABOUT YOURSELF - OR THAT YOU ARE A FAILURE OR HAVE LET YOURSELF OR YOUR FAMILY DOWN: NOT AL ALL
9. THOUGHTS THAT YOU WOULD BE BETTER OFF DEAD, OR OF HURTING YOURSELF: NOT AT ALL
3. TROUBLE FALLING OR STAYING ASLEEP OR SLEEPING TOO MUCH: NOT AT ALL
2. FEELING DOWN, DEPRESSED, IRRITABLE, OR HOPELESS: NOT AT ALL
8. MOVING OR SPEAKING SO SLOWLY THAT OTHER PEOPLE COULD HAVE NOTICED. OR THE OPPOSITE, BEING SO FIGETY OR RESTLESS THAT YOU HAVE BEEN MOVING AROUND A LOT MORE THAN USUAL: NOT AT ALL
4. FEELING TIRED OR HAVING LITTLE ENERGY: NOT AT ALL

## 2019-02-20 NOTE — ASSESSMENT & PLAN NOTE
Chronic, stable well-controlled on medications.  Blood pressure is 122/80 and she denies symptoms of hypertension.  She is up-to-date with labs and does not need refills on medications at this time.

## 2019-02-20 NOTE — PROGRESS NOTES
Chief Complaint   Patient presents with   • Diabetes     fv labs         This is a 68 y.o.female patient that presents today with the following:Follow-up visit, review labs    Type 2 diabetes mellitus with complication, without long-term current use of insulin (HCC)  This is chronic and stable, suboptimally controlled on current medications including metformin 500 mg twice daily.  A1c back in August was 6.9%, this has gone up to 7.3%.  She does admit to poor eating and lack of exercise over the past several months.  She is agreeable to increasing dose of metformin, she will take 1000 mg twice daily and follow-up with labs in 3 months, however she will see me again in 6 months.    Hyperlipidemia  The 10-year ASCVD risk score ([place] SOLOMON Jr., et al., 2013) is: 17.6%    Values used to calculate the score:      Age: 68 years      Sex: Female      Is Non- : No      Diabetic: Yes      Tobacco smoker: No      Systolic Blood Pressure: 122 mmHg      Is BP treated: Yes      HDL Cholesterol: 44 mg/dL      Total Cholesterol: 228 mg/dL  Patient does understand that she is at increased risk of having cardiovascular event over the next 10 years but still continues to decline statin therapy.  She is not interested in trying on statins as well.  She will continue to work on diet.    Hypertension  Chronic, stable well-controlled on medications.  Blood pressure is 122/80 and she denies symptoms of hypertension.  She is up-to-date with labs and does not need refills on medications at this time.    Preglaucoma  Patient was told recently by her ophthalmologist she possibly has preglaucoma, she is scheduled to have cataract extraction in the next couple weeks.      Hospital Outpatient Visit on 02/12/2019   Component Date Value   • Glycohemoglobin 02/12/2019 7.3*   • Est Avg Glucose 02/12/2019 163    • Cholesterol,Tot 02/12/2019 228*   • Triglycerides 02/12/2019 214*   • HDL 02/12/2019 44    • LDL 02/12/2019 141*   •  Creatinine, Urine 2019 427.00    • Microalbumin, Urine Rand* 2019 2.5    • Micro Alb Creat Ratio 2019 6          clinical course has been stable    Past Medical History:   Diagnosis Date   • Asthma    • Diabetes mellitus type 2 in nonobese (HCC)    • History of mammogram     fibrocystic breast disease   • Hyperlipidemia    • Irritable bowel syndrome    • Pelvic exam     ASCUS, then had hysterectomy   • S/P colonoscopy        • Vitamin D deficiency        Past Surgical History:   Procedure Laterality Date   • SOLO BY LAPAROSCOPY     • CYSTECTOMY     • DILATION AND CURETTAGE      x4   • HYSTERECTOMY, TOTAL ABDOMINAL      took everything       Family History   Problem Relation Age of Onset   • Hypertension Mother    • Other Father         RA,  age 40       Ace inhibitors; Demerol; Fiorinal; Minipress [fd&c blue #1-fd&c red #40-prazosin]; Prednisone; Trilipix [choline fenofibrate]; and Vicodin [hydrocodone-acetaminophen]    Current Outpatient Prescriptions Ordered in [facility]   Medication Sig Dispense Refill   • sertraline (ZOLOFT) 25 MG tablet Take 25 mg by mouth every day.     • metFORMIN ER (GLUCOPHAGE XR) 500 MG TABLET SR 24 HR Take 2 Tabs by mouth 2 times a day. 360 Tab 3   • amLODIPine (NORVASC) 10 MG Tab Take 1 Tab by mouth every day. 90 Tab 0   • hydroCHLOROthiazide (HYDRODIURIL) 25 MG Tab Take 1 Tab by mouth every day. 90 Tab 0   • buPROPion SR (WELLBUTRIN SR) 150 MG TABLET SR 12 HR sustained-release tablet Take 1 Tab by mouth every day. 90 Tab 1   • pantoprazole (PROTONIX) 40 MG Tablet Delayed Response Take 1 Tab by mouth every day. 90 Tab 1   • Cholecalciferol (VITAMIN D3) 5000 UNIT TABS Take 1,000 mg by mouth.     • Probiotic Product (PROBIOTIC-10) Cap Take  by mouth.       No current Epic-ordered facility-administered medications on file.        Constitutional ROS: No unexpected change in weight, No fatigue, No unexplained fevers, sweats, or chills  Eyes: Positive per HPI  Pulmonary  "ROS: No chronic cough, sputum, or hemoptysis, No shortness of breath, No recent change in breathing  Cardiovascular ROS: No edema, No palpitations.  Positive for hypertension and hyperlipidemia  Gastrointestinal ROS: No abdominal pain, No nausea, vomiting, diarrhea, or constipation  Musculoskeletal/Extremities ROS: No clubbing, No peripheral edema, No pain, redness or swelling on the joints  Neurologic ROS: Normal development  Endocrine ROS: Positive per HPI    Physical exam:  /80 (BP Location: Left arm, Patient Position: Sitting, BP Cuff Size: Adult long)   Pulse 76   Temp 36.8 °C (98.2 °F) (Temporal)   Resp 16   Ht 1.676 m (5' 6\")   Wt 71.7 kg (158 lb)   SpO2 96%   BMI 25.50 kg/m²    General Appearance: Very pleasant older female, alert, no distress, well-nourished, well-groomed  Skin: Skin color, texture, turgor normal. No rashes or lesions.  Lungs: negative findings: normal respiratory rate and rhythm, lungs clear to auscultation  Heart: negative. RRR without murmur, gallop, or rubs.  No ectopy.  Abdomen: Abdomen soft, non-tender. BS normal. No masses,  No organomegaly  Musculoskeletal: negative findings: no evidence of joint instability, no evidence of muscle atrophy, no deformities present  Neurologic: intact    Medical decision making/discussion: Patient will increase metformin to 1000 mg twice daily follow-up with me in 3 months with labs done before visit.  She is encouraged to continue with healthy eating, regular physical activity and continued efforts towards maintaining healthy weight.    Ting was seen today for diabetes.    Diagnoses and all orders for this visit:    Type 2 diabetes mellitus with complication, without long-term current use of insulin (HCC)  -     HEMOGLOBIN A1C; Future  -     metFORMIN ER (GLUCOPHAGE XR) 500 MG TABLET SR 24 HR; Take 2 Tabs by mouth 2 times a day.    Mixed hyperlipidemia    Essential hypertension    Preglaucoma, unspecified laterality    Age-related " incipient cataract of both eyes          Please note that this dictation was created using voice recognition software. I have made every reasonable attempt to correct obvious errors, but I expect that there are errors of grammar and possibly content that I did not discover before finalizing the note.

## 2019-02-20 NOTE — ASSESSMENT & PLAN NOTE
Patient was told recently by her ophthalmologist she possibly has preglaucoma, she is scheduled to have cataract extraction in the next couple weeks.

## 2019-02-20 NOTE — ASSESSMENT & PLAN NOTE
This is chronic and stable, suboptimally controlled on current medications including metformin 500 mg twice daily.  A1c back in August was 6.9%, this has gone up to 7.3%.  She does admit to poor eating and lack of exercise over the past several months.  She is agreeable to increasing dose of metformin, she will take 1000 mg twice daily and follow-up with labs in 3 months, however she will see me again in 6 months.

## 2019-02-20 NOTE — PATIENT INSTRUCTIONS
Follow up at 6 months    But have lab done in 3 months    Increase dose of metformin to 2 pills twice a day

## 2019-02-20 NOTE — ASSESSMENT & PLAN NOTE
The 10-year ASCVD risk score ([place] SOLOMON Jr., et al., 2013) is: 17.6%    Values used to calculate the score:      Age: 68 years      Sex: Female      Is Non- : No      Diabetic: Yes      Tobacco smoker: No      Systolic Blood Pressure: 122 mmHg      Is BP treated: Yes      HDL Cholesterol: 44 mg/dL      Total Cholesterol: 228 mg/dL  Patient does understand that she is at increased risk of having cardiovascular event over the next 10 years but still continues to decline statin therapy.  She is not interested in trying on statins as well.  She will continue to work on diet.

## 2019-03-16 DIAGNOSIS — F33.1 MODERATE EPISODE OF RECURRENT MAJOR DEPRESSIVE DISORDER (HCC): ICD-10-CM

## 2019-03-18 NOTE — TELEPHONE ENCOUNTER
*HISTORICAL MEDICATION*  Was the patient seen in the last year in this department? Yes    Does patient have an active prescription for medications requested? No     Received Request Via: Pharmacy      Pt met protocol?: Yes    LAST OV 02/20/2019

## 2019-04-23 DIAGNOSIS — I10 ESSENTIAL HYPERTENSION: ICD-10-CM

## 2019-04-23 RX ORDER — AMLODIPINE BESYLATE 10 MG/1
10 TABLET ORAL
Qty: 90 TAB | Refills: 1 | Status: SHIPPED | OUTPATIENT
Start: 2019-04-23 | End: 2019-11-02 | Stop reason: SDUPTHER

## 2019-04-23 RX ORDER — HYDROCHLOROTHIAZIDE 25 MG/1
25 TABLET ORAL DAILY
Qty: 90 TAB | Refills: 1 | Status: SHIPPED | OUTPATIENT
Start: 2019-04-23 | End: 2019-10-26 | Stop reason: SDUPTHER

## 2019-04-23 NOTE — TELEPHONE ENCOUNTER
Refill X 6 months, sent to pharmacy.Pt. Seen in the last 6 months per protocol.   Lab Results   Component Value Date/Time    SODIUM 139 08/13/2018 07:45 AM    POTASSIUM 3.9 08/13/2018 07:45 AM    CHLORIDE 103 08/13/2018 07:45 AM    CO2 25 08/13/2018 07:45 AM    GLUCOSE 122 (H) 08/13/2018 07:45 AM    BUN 20 08/13/2018 07:45 AM    CREATININE 0.88 08/13/2018 07:45 AM    CREATININE 0.75 01/24/2012 11:53 AM    BUNCREATRAT 24 01/24/2012 11:53 AM

## 2019-04-23 NOTE — TELEPHONE ENCOUNTER
Was the patient seen in the last year in this department? Yes    Does patient have an active prescription for medications requested? No     Received Request Via: Pharmacy    Pt met protocol?: Yes     Last OV 02/20/2019    BP Readings from Last 1 Encounters:   02/20/19 122/80

## 2019-05-20 ENCOUNTER — HOSPITAL ENCOUNTER (OUTPATIENT)
Dept: LAB | Facility: MEDICAL CENTER | Age: 69
End: 2019-05-20
Attending: NURSE PRACTITIONER
Payer: MEDICARE

## 2019-05-20 DIAGNOSIS — E11.8 TYPE 2 DIABETES MELLITUS WITH COMPLICATION, WITHOUT LONG-TERM CURRENT USE OF INSULIN (HCC): ICD-10-CM

## 2019-05-20 LAB
EST. AVERAGE GLUCOSE BLD GHB EST-MCNC: 163 MG/DL
HBA1C MFR BLD: 7.3 % (ref 0–5.6)

## 2019-05-20 PROCEDURE — 83036 HEMOGLOBIN GLYCOSYLATED A1C: CPT | Mod: GA

## 2019-05-20 PROCEDURE — 36415 COLL VENOUS BLD VENIPUNCTURE: CPT | Mod: GA

## 2019-06-06 DIAGNOSIS — E78.2 MIXED HYPERLIPIDEMIA: ICD-10-CM

## 2019-06-06 DIAGNOSIS — E11.8 TYPE 2 DIABETES MELLITUS WITH COMPLICATION, WITHOUT LONG-TERM CURRENT USE OF INSULIN (HCC): ICD-10-CM

## 2019-06-06 DIAGNOSIS — I10 ESSENTIAL HYPERTENSION: ICD-10-CM

## 2019-06-06 RX ORDER — PIOGLITAZONEHYDROCHLORIDE 15 MG/1
15 TABLET ORAL DAILY
Qty: 90 TAB | Refills: 1 | Status: SHIPPED | OUTPATIENT
Start: 2019-06-06 | End: 2019-08-20 | Stop reason: SDUPTHER

## 2019-06-21 DIAGNOSIS — K21.9 GASTROESOPHAGEAL REFLUX DISEASE, ESOPHAGITIS PRESENCE NOT SPECIFIED: ICD-10-CM

## 2019-06-21 RX ORDER — PANTOPRAZOLE SODIUM 40 MG/1
40 TABLET, DELAYED RELEASE ORAL
Qty: 90 TAB | Refills: 0 | Status: SHIPPED | OUTPATIENT
Start: 2019-06-21 | End: 2019-08-20

## 2019-06-21 NOTE — TELEPHONE ENCOUNTER
Was the patient seen in the last year in this department? Yes    Does patient have an active prescription for medications requested? No     Received Request Via: Pharmacy      Pt met protocol?: Yes, OV 2/19

## 2019-07-22 DIAGNOSIS — F33.1 MAJOR DEPRESSIVE DISORDER, RECURRENT EPISODE, MODERATE (HCC): ICD-10-CM

## 2019-07-22 DIAGNOSIS — E11.8 TYPE 2 DIABETES MELLITUS WITH COMPLICATION, WITHOUT LONG-TERM CURRENT USE OF INSULIN (HCC): ICD-10-CM

## 2019-07-22 RX ORDER — METFORMIN HYDROCHLORIDE 500 MG/1
1000 TABLET, EXTENDED RELEASE ORAL 2 TIMES DAILY
Qty: 360 TAB | Refills: 0 | Status: CANCELLED | OUTPATIENT
Start: 2019-07-22

## 2019-07-23 DIAGNOSIS — E11.8 TYPE 2 DIABETES MELLITUS WITH COMPLICATION, WITHOUT LONG-TERM CURRENT USE OF INSULIN (HCC): ICD-10-CM

## 2019-07-23 RX ORDER — BUPROPION HYDROCHLORIDE 150 MG/1
TABLET, EXTENDED RELEASE ORAL
Qty: 90 TAB | Refills: 0 | Status: SHIPPED | OUTPATIENT
Start: 2019-07-23 | End: 2019-10-21 | Stop reason: SDUPTHER

## 2019-07-23 RX ORDER — METFORMIN HYDROCHLORIDE 500 MG/1
1000 TABLET, EXTENDED RELEASE ORAL 2 TIMES DAILY
Qty: 360 TAB | Refills: 3 | Status: SHIPPED | OUTPATIENT
Start: 2019-07-23 | End: 2019-08-20

## 2019-08-13 ENCOUNTER — HOSPITAL ENCOUNTER (OUTPATIENT)
Dept: LAB | Facility: MEDICAL CENTER | Age: 69
End: 2019-08-13
Attending: NURSE PRACTITIONER
Payer: MEDICARE

## 2019-08-13 DIAGNOSIS — E78.2 MIXED HYPERLIPIDEMIA: ICD-10-CM

## 2019-08-13 DIAGNOSIS — I10 ESSENTIAL HYPERTENSION: ICD-10-CM

## 2019-08-13 DIAGNOSIS — E11.8 TYPE 2 DIABETES MELLITUS WITH COMPLICATION, WITHOUT LONG-TERM CURRENT USE OF INSULIN (HCC): ICD-10-CM

## 2019-08-13 LAB
ALBUMIN SERPL BCP-MCNC: 4.4 G/DL (ref 3.2–4.9)
ALBUMIN/GLOB SERPL: 1.5 G/DL
ALP SERPL-CCNC: 67 U/L (ref 30–99)
ALT SERPL-CCNC: 32 U/L (ref 2–50)
ANION GAP SERPL CALC-SCNC: 12 MMOL/L (ref 0–11.9)
AST SERPL-CCNC: 23 U/L (ref 12–45)
BILIRUB SERPL-MCNC: 0.6 MG/DL (ref 0.1–1.5)
BUN SERPL-MCNC: 21 MG/DL (ref 8–22)
CALCIUM SERPL-MCNC: 10 MG/DL (ref 8.5–10.5)
CHLORIDE SERPL-SCNC: 102 MMOL/L (ref 96–112)
CHOLEST SERPL-MCNC: 237 MG/DL (ref 100–199)
CO2 SERPL-SCNC: 25 MMOL/L (ref 20–33)
CREAT SERPL-MCNC: 0.93 MG/DL (ref 0.5–1.4)
FASTING STATUS PATIENT QL REPORTED: NORMAL
GLOBULIN SER CALC-MCNC: 2.9 G/DL (ref 1.9–3.5)
GLUCOSE SERPL-MCNC: 142 MG/DL (ref 65–99)
HDLC SERPL-MCNC: 52 MG/DL
LDLC SERPL CALC-MCNC: 152 MG/DL
POTASSIUM SERPL-SCNC: 3.8 MMOL/L (ref 3.6–5.5)
PROT SERPL-MCNC: 7.3 G/DL (ref 6–8.2)
SODIUM SERPL-SCNC: 139 MMOL/L (ref 135–145)
TRIGL SERPL-MCNC: 163 MG/DL (ref 0–149)

## 2019-08-13 PROCEDURE — 80053 COMPREHEN METABOLIC PANEL: CPT

## 2019-08-13 PROCEDURE — 80061 LIPID PANEL: CPT

## 2019-08-13 PROCEDURE — 83036 HEMOGLOBIN GLYCOSYLATED A1C: CPT | Mod: GA

## 2019-08-13 PROCEDURE — 36415 COLL VENOUS BLD VENIPUNCTURE: CPT | Mod: GA

## 2019-08-14 LAB
EST. AVERAGE GLUCOSE BLD GHB EST-MCNC: 157 MG/DL
HBA1C MFR BLD: 7.1 % (ref 0–5.6)

## 2019-08-20 ENCOUNTER — OFFICE VISIT (OUTPATIENT)
Dept: MEDICAL GROUP | Facility: PHYSICIAN GROUP | Age: 69
End: 2019-08-20
Payer: MEDICARE

## 2019-08-20 VITALS
BODY MASS INDEX: 25.55 KG/M2 | WEIGHT: 159 LBS | OXYGEN SATURATION: 96 % | RESPIRATION RATE: 16 BRPM | HEIGHT: 66 IN | HEART RATE: 73 BPM | DIASTOLIC BLOOD PRESSURE: 68 MMHG | TEMPERATURE: 97.4 F | SYSTOLIC BLOOD PRESSURE: 130 MMHG

## 2019-08-20 DIAGNOSIS — Z00.00 HEALTH CARE MAINTENANCE: ICD-10-CM

## 2019-08-20 DIAGNOSIS — I10 ESSENTIAL HYPERTENSION: ICD-10-CM

## 2019-08-20 DIAGNOSIS — E11.8 TYPE 2 DIABETES MELLITUS WITH COMPLICATION, WITHOUT LONG-TERM CURRENT USE OF INSULIN (HCC): ICD-10-CM

## 2019-08-20 DIAGNOSIS — E78.2 MIXED HYPERLIPIDEMIA: ICD-10-CM

## 2019-08-20 PROCEDURE — 99214 OFFICE O/P EST MOD 30 MIN: CPT | Performed by: NURSE PRACTITIONER

## 2019-08-20 RX ORDER — METFORMIN HYDROCHLORIDE 500 MG/1
1000 TABLET, EXTENDED RELEASE ORAL DAILY
COMMUNITY
End: 2020-09-09

## 2019-08-20 RX ORDER — PIOGLITAZONEHYDROCHLORIDE 30 MG/1
30 TABLET ORAL DAILY
Qty: 90 TAB | Refills: 3 | Status: SHIPPED | OUTPATIENT
Start: 2019-08-20 | End: 2019-11-26

## 2019-08-20 ASSESSMENT — PAIN SCALES - GENERAL: PAINLEVEL: NO PAIN

## 2019-08-20 NOTE — ASSESSMENT & PLAN NOTE
When going over all health maintenance exams and immunizations patient continues to decline all of them, she does understand the risks of doing so.

## 2019-08-20 NOTE — ASSESSMENT & PLAN NOTE
The 10-year ASCVD risk score ([place] SOLOMON Jr., et al., 2013) is: 20.9%  Patient continues to decline statin, she will continue to work on lifestyle modifications.  She does understand the risks associated with not taking statin especially in the setting of her comorbid conditions.

## 2019-08-20 NOTE — PROGRESS NOTES
Chief Complaint   Patient presents with   • Labs Only   • Other     dclines all          This is a 69 y.o.female patient that presents today with the following:Diabetes follow-up, review labs    Health care maintenance  When going over all health maintenance exams and immunizations patient continues to decline all of them, she does understand the risks of doing so.    Hyperlipidemia  The 10-year ASCVD risk score ([place] SOLOMON Jr., et al., 2013) is: 20.9%  Patient continues to decline statin, she will continue to work on lifestyle modifications.  She does understand the risks associated with not taking statin especially in the setting of her comorbid conditions.    Type 2 diabetes mellitus with complication, without long-term current use of insulin (HCC)  This is a chronic condition, stable suboptimally controlled on current medications but improved.  She currently takes metformin 1000 mg daily in the morning, pioglitazone was added to regimen.  A1c was 7.3% last time measured it is now down to 7.1%.  She does agree to increasing the pioglitazone to 30 mg daily, new prescription called in.  She is not on statin or ACE inhibitor and continues to decline these medications.  She is going to follow-up with me in 3 months with labs and before visit.      Hospital Outpatient Visit on 08/13/2019   Component Date Value   • Glycohemoglobin 08/13/2019 7.1*   • Est Avg Glucose 08/13/2019 157    • Cholesterol,Tot 08/13/2019 237*   • Triglycerides 08/13/2019 163*   • HDL 08/13/2019 52    • LDL 08/13/2019 152*   • Sodium 08/13/2019 139    • Potassium 08/13/2019 3.8    • Chloride 08/13/2019 102    • Co2 08/13/2019 25    • Anion Gap 08/13/2019 12.0*   • Glucose 08/13/2019 142*   • Bun 08/13/2019 21    • Creatinine 08/13/2019 0.93    • Calcium 08/13/2019 10.0    • AST(SGOT) 08/13/2019 23    • ALT(SGPT) 08/13/2019 32    • Alkaline Phosphatase 08/13/2019 67    • Total Bilirubin 08/13/2019 0.6    • Albumin 08/13/2019 4.4    • Total Protein  2019 7.3    • Globulin 2019 2.9    • A-G Ratio 2019 1.5    • Fasting Status 2019 Fasting    • GFR If  2019 >60    • GFR If Non  Ameri* 2019 60          clinical course has been stable    Past Medical History:   Diagnosis Date   • Asthma    • Diabetes mellitus type 2 in nonobese (HCC)    • History of mammogram     fibrocystic breast disease   • Hyperlipidemia    • Irritable bowel syndrome    • Pelvic exam     ASCUS, then had hysterectomy   • S/P colonoscopy        • Vitamin D deficiency        Past Surgical History:   Procedure Laterality Date   • SOLO BY LAPAROSCOPY     • CYSTECTOMY     • DILATION AND CURETTAGE      x4   • HYSTERECTOMY, TOTAL ABDOMINAL      took everything       Family History   Problem Relation Age of Onset   • Hypertension Mother    • Other Father         RA,  age 40       Ace inhibitors; Demerol; Fiorinal; Minipress [fd&c blue #1-fd&c red #40-prazosin]; Prednisone; Statins [hmg-coa-r inhibitors]; Trilipix [choline fenofibrate]; and Vicodin [hydrocodone-acetaminophen]    Current Outpatient Medications Ordered in Epic   Medication Sig Dispense Refill   • metFORMIN ER (GLUCOPHAGE XR) 500 MG TABLET SR 24 HR Take 1,000 mg by mouth every day.     • pioglitazone (ACTOS) 30 MG Tab Take 1 Tab by mouth every day. 90 Tab 3   • buPROPion SR (WELLBUTRIN-SR) 150 MG TABLET SR 12 HR sustained-release tablet TAKE ONE TABLET BY MOUTH EVERY DAY 90 Tab 0   • amLODIPine (NORVASC) 10 MG Tab Take 1 Tab by mouth every day. 90 Tab 1   • hydroCHLOROthiazide (HYDRODIURIL) 25 MG Tab Take 1 Tab by mouth every day. 90 Tab 1   • Cholecalciferol (VITAMIN D3) 5000 UNIT TABS Take 1,000 mg by mouth.       No current Epic-ordered facility-administered medications on file.        Constitutional ROS: No unexpected change in weight, No weakness, No unexplained fevers, sweats, or chills  Pulmonary ROS: No chronic cough, sputum, or hemoptysis, No shortness of breath, No  "recent change in breathing  Cardiovascular ROS: No chest pain, No edema, No palpitations, Positive for hypertension and hyperlipidemia  Gastrointestinal ROS: No abdominal pain, No nausea, vomiting, diarrhea, or constipation  Musculoskeletal/Extremities ROS: No clubbing, No peripheral edema, No pain, redness or swelling on the joints  Neurologic ROS: Normal development, No seizures, No weakness  Endocrine ROS: Positive per HPI    Physical exam:  /68 (BP Location: Right arm, Patient Position: Sitting, BP Cuff Size: Adult)   Pulse 73   Temp 36.3 °C (97.4 °F) (Temporal)   Resp 16   Ht 1.676 m (5' 6\")   Wt 72.1 kg (159 lb)   SpO2 96%   Breastfeeding? No   BMI 25.66 kg/m²   General Appearance: Pleasant elderly female, alert, no distress, well-nourished, well-groomed  Skin: Skin color, texture, turgor normal. No rashes or lesions.  Lungs: negative findings: normal respiratory rate and rhythm, normal effort  Musculoskeletal: negative findings: no evidence of joint instability, no evidence of muscle atrophy, no deformities present  Neurologic: intact, CN II through XII grossly intact    Medical decision making/discussion: Patient does agree to increase pioglitazone to 30 mg daily, new prescription called in.  She is going to follow-up with me in 3 months with labs done before visit.  Patient continues to decline all health maintenance exams and immunizations, she understands the risks of doing so, she also continues to decline statin.    Ting was seen today for labs only and other.    Diagnoses and all orders for this visit:    Type 2 diabetes mellitus with complication, without long-term current use of insulin (HCC)  -     pioglitazone (ACTOS) 30 MG Tab; Take 1 Tab by mouth every day.  -     HEMOGLOBIN A1C; Future    Mixed hyperlipidemia    Essential hypertension    Health care maintenance        Return in about 3 months (around 11/20/2019) for Follow-up, Discuss Labs.        Please note that this dictation " was created using voice recognition software. I have made every reasonable attempt to correct obvious errors, but I expect that there are errors of grammar and possibly content that I did not discover before finalizing the note.

## 2019-08-20 NOTE — ASSESSMENT & PLAN NOTE
This is a chronic condition, stable suboptimally controlled on current medications but improved.  She currently takes metformin 1000 mg daily in the morning, pioglitazone was added to regimen.  A1c was 7.3% last time measured it is now down to 7.1%.  She does agree to increasing the pioglitazone to 30 mg daily, new prescription called in.  She is not on statin or ACE inhibitor and continues to decline these medications.  She is going to follow-up with me in 3 months with labs and before visit.

## 2019-08-21 RX ORDER — PANTOPRAZOLE SODIUM 40 MG/1
40 TABLET, DELAYED RELEASE ORAL DAILY
COMMUNITY
End: 2019-09-20 | Stop reason: SDUPTHER

## 2019-09-20 RX ORDER — PANTOPRAZOLE SODIUM 40 MG/1
40 TABLET, DELAYED RELEASE ORAL DAILY
Qty: 90 TAB | Refills: 0 | Status: SHIPPED | OUTPATIENT
Start: 2019-09-20 | End: 2019-12-19 | Stop reason: SDUPTHER

## 2019-10-21 DIAGNOSIS — F33.1 MAJOR DEPRESSIVE DISORDER, RECURRENT EPISODE, MODERATE (HCC): ICD-10-CM

## 2019-10-21 RX ORDER — BUPROPION HYDROCHLORIDE 150 MG/1
150 TABLET, EXTENDED RELEASE ORAL
Qty: 90 TAB | Refills: 0 | Status: SHIPPED | OUTPATIENT
Start: 2019-10-21 | End: 2020-01-17 | Stop reason: SDUPTHER

## 2019-10-22 ENCOUNTER — OFFICE VISIT (OUTPATIENT)
Dept: URGENT CARE | Facility: PHYSICIAN GROUP | Age: 69
End: 2019-10-22
Payer: MEDICARE

## 2019-10-22 VITALS
RESPIRATION RATE: 16 BRPM | WEIGHT: 162 LBS | SYSTOLIC BLOOD PRESSURE: 134 MMHG | OXYGEN SATURATION: 97 % | HEART RATE: 76 BPM | TEMPERATURE: 97.3 F | BODY MASS INDEX: 26.15 KG/M2 | DIASTOLIC BLOOD PRESSURE: 86 MMHG

## 2019-10-22 DIAGNOSIS — J01.40 ACUTE PANSINUSITIS, RECURRENCE NOT SPECIFIED: ICD-10-CM

## 2019-10-22 PROCEDURE — 99214 OFFICE O/P EST MOD 30 MIN: CPT | Performed by: PHYSICIAN ASSISTANT

## 2019-10-22 RX ORDER — AMOXICILLIN AND CLAVULANATE POTASSIUM 875; 125 MG/1; MG/1
1 TABLET, FILM COATED ORAL 2 TIMES DAILY
Qty: 14 TAB | Refills: 0 | Status: SHIPPED | OUTPATIENT
Start: 2019-10-22 | End: 2019-10-29

## 2019-10-22 ASSESSMENT — ENCOUNTER SYMPTOMS
SINUS PAIN: 1
FEVER: 0
MYALGIAS: 0
TINGLING: 0
SORE THROAT: 1
EYE REDNESS: 0
WHEEZING: 0
VOMITING: 0
HEADACHES: 1
SINUS PRESSURE: 1
CHILLS: 0
COUGH: 1
DIZZINESS: 0
HOARSE VOICE: 1
DIARRHEA: 0
EYE DISCHARGE: 0
NECK PAIN: 0

## 2019-10-22 NOTE — PROGRESS NOTES
Subjective:      Ting Gregory is a 69 y.o. female who presents with Sinusitis (x3wks )            Sinusitis   This is a new problem. Episode onset: 3 weeks ago. The problem has been waxing and waning since onset. There has been no fever. Associated symptoms include congestion, coughing, headaches, a hoarse voice, sinus pressure and a sore throat. Pertinent negatives include no chills, ear pain or neck pain. (Bilateral ear pressure) Treatments tried: Claritin, Aleve. The treatment provided mild relief.       Review of Systems   Constitutional: Positive for malaise/fatigue. Negative for chills and fever.   HENT: Positive for congestion, hoarse voice, sinus pressure, sinus pain and sore throat. Negative for ear discharge and ear pain.         Pos. For ear pressure     Eyes: Negative for discharge and redness.   Respiratory: Positive for cough. Negative for wheezing.    Gastrointestinal: Negative for diarrhea and vomiting.   Genitourinary: Negative for dysuria and urgency.   Musculoskeletal: Negative for myalgias and neck pain.   Skin: Negative for itching and rash.   Neurological: Positive for headaches. Negative for dizziness and tingling.   All other systems reviewed and are negative.         Objective:     /86   Pulse 76   Temp 36.3 °C (97.3 °F) (Temporal)   Resp 16   Wt 73.5 kg (162 lb)   SpO2 97%   BMI 26.15 kg/m²    PMH:  has a past medical history of Asthma, Diabetes mellitus type 2 in nonobese (HCC), History of mammogram, Hyperlipidemia, Irritable bowel syndrome, Pelvic exam, S/P colonoscopy, and Vitamin D deficiency. She also has no past medical history of Breast cancer ([facility]) or Encounter for long-term (current) use of other medications.  MEDS: Reviewed.     ALLERGIES:   Allergies   Allergen Reactions   • Ace Inhibitors Swelling   • Demerol Rash     Rash     • Fiorinal Rash     Rash     • Minipress [Fd&C Blue #1-Fd&C Red #40-Prazosin] Unspecified     Cannot remember reaction   •  Prednisone Unspecified     Migraine, joint pain swelling   • Statins [Hmg-Coa-R Inhibitors]      Severe cramps   • Trilipix [Choline Fenofibrate]      Angioedema, hives   • Vicodin [Hydrocodone-Acetaminophen] Rash     Rash       SURGHX:   Past Surgical History:   Procedure Laterality Date   • SOLO BY LAPAROSCOPY     • CYSTECTOMY     • DILATION AND CURETTAGE      x4   • HYSTERECTOMY, TOTAL ABDOMINAL      took everything     SOCHX:  reports that she quit smoking about 38 years ago. Her smoking use included cigarettes. She has a 24.00 pack-year smoking history. She has never used smokeless tobacco. She reports that she does not drink alcohol or use drugs.  FH: Family history was reviewed, no pertinent findings to report    Physical Exam   Constitutional: She is oriented to person, place, and time. She appears well-developed and well-nourished.   HENT:   Head: Normocephalic and atraumatic.   Mouth/Throat: No oropharyngeal exudate.   Bilateral clear effusions- without bulge or erythema to the TM.   Posterior oropharynx with pos. PND without tonsillar edema or erythema.   Nose- boggy turbinates with mild-moderate amount of nasal discharge.  Left worse than right maxillary sinus tenderness with percussion.    Eyes: Pupils are equal, round, and reactive to light. EOM are normal.   Neck: Normal range of motion. Neck supple.   Cardiovascular: Normal rate and regular rhythm.   Pulmonary/Chest: Effort normal. No respiratory distress. She has no wheezes.   Musculoskeletal: Normal range of motion. She exhibits no edema.   Lymphadenopathy:     She has no cervical adenopathy.   Neurological: She is alert and oriented to person, place, and time.   Skin: Skin is warm. No rash noted.   Psychiatric: She has a normal mood and affect. Her behavior is normal.   Vitals reviewed.              Assessment/Plan:     1. Acute pansinusitis, recurrence not specified  - amoxicillin-clavulanate (AUGMENTIN) 875-125 MG Tab; Take 1 Tab by mouth 2  times a day for 7 days.  Dispense: 14 Tab; Refill: 0    Due to duration of symptoms, sinus tenderness, and failure of OTC therapies- ABX was written to tx. For bacterial etiology for sinusitis.   Continue OTC supportive therapies- Flonase, OTC allergy meds, avoid night time dairy. Increase fluids. Humidification.   Patient given precautionary s/sx that mandate immediate follow up and evaluation in the ED. Advised of risks of not doing so.    DDX, Supportive care, and indications for immediate follow-up discussed with patient.    Instructed to return to clinic or nearest emergency department if we are not available for any change in condition, further concerns, or worsening of symptoms.    The patient demonstrated a good understanding and agreed with the treatment plan

## 2019-10-26 DIAGNOSIS — I10 ESSENTIAL HYPERTENSION: ICD-10-CM

## 2019-10-28 RX ORDER — HYDROCHLOROTHIAZIDE 25 MG/1
TABLET ORAL
Qty: 90 TAB | Refills: 0 | Status: SHIPPED | OUTPATIENT
Start: 2019-10-28 | End: 2020-01-29 | Stop reason: SDUPTHER

## 2019-10-28 NOTE — TELEPHONE ENCOUNTER
Was the patient seen in the last year in this department? Yes    Does patient have an active prescription for medications requested? No     Received Request Via: Pharmacy      Pt met protocol?: Yes    OV 8/19     BP Readings from Last 1 Encounters:   10/22/19 134/86

## 2019-10-31 ENCOUNTER — HOSPITAL ENCOUNTER (OUTPATIENT)
Dept: LAB | Facility: MEDICAL CENTER | Age: 69
End: 2019-10-31
Attending: PHYSICIAN ASSISTANT
Payer: MEDICARE

## 2019-10-31 ENCOUNTER — OFFICE VISIT (OUTPATIENT)
Dept: URGENT CARE | Facility: PHYSICIAN GROUP | Age: 69
End: 2019-10-31
Payer: MEDICARE

## 2019-10-31 VITALS
RESPIRATION RATE: 16 BRPM | HEART RATE: 105 BPM | HEIGHT: 67 IN | TEMPERATURE: 98.6 F | SYSTOLIC BLOOD PRESSURE: 160 MMHG | OXYGEN SATURATION: 98 % | DIASTOLIC BLOOD PRESSURE: 100 MMHG | BODY MASS INDEX: 25.11 KG/M2 | WEIGHT: 160 LBS

## 2019-10-31 DIAGNOSIS — G89.29 CHRONIC INTRACTABLE HEADACHE, UNSPECIFIED HEADACHE TYPE: ICD-10-CM

## 2019-10-31 DIAGNOSIS — L50.9 URTICARIAL RASH: ICD-10-CM

## 2019-10-31 DIAGNOSIS — R51.9 CHRONIC INTRACTABLE HEADACHE, UNSPECIFIED HEADACHE TYPE: ICD-10-CM

## 2019-10-31 LAB — ERYTHROCYTE [SEDIMENTATION RATE] IN BLOOD BY WESTERGREN METHOD: 8 MM/HOUR (ref 0–30)

## 2019-10-31 PROCEDURE — 99214 OFFICE O/P EST MOD 30 MIN: CPT | Performed by: PHYSICIAN ASSISTANT

## 2019-10-31 PROCEDURE — 85652 RBC SED RATE AUTOMATED: CPT

## 2019-10-31 PROCEDURE — 36415 COLL VENOUS BLD VENIPUNCTURE: CPT

## 2019-10-31 RX ORDER — TRIAMCINOLONE ACETONIDE 5 MG/G
1 OINTMENT TOPICAL 2 TIMES DAILY
Qty: 60 G | Refills: 0 | Status: SHIPPED | OUTPATIENT
Start: 2019-10-31 | End: 2019-11-26

## 2019-10-31 ASSESSMENT — ENCOUNTER SYMPTOMS
FEVER: 0
MUSCULOSKELETAL NEGATIVE: 1
ABDOMINAL PAIN: 0
DIARRHEA: 0
CHILLS: 0
NAIL CHANGES: 0
NAUSEA: 0
SHORTNESS OF BREATH: 0
DIZZINESS: 0
COUGH: 0
SPUTUM PRODUCTION: 0
VOMITING: 0

## 2019-10-31 NOTE — PROGRESS NOTES
"Subjective:      Ting Gregory is a 69 y.o. female who presents with Rash (just finished antibiotics)            Rash   This is a new problem. The current episode started yesterday. The problem has been gradually worsening since onset. The rash is diffuse. The rash is characterized by itchiness, redness and swelling. She was exposed to a new medication. Pertinent negatives include no congestion, cough, diarrhea, fever, joint pain, nail changes, shortness of breath or vomiting. Past treatments include antihistamine. The treatment provided mild relief. There is no history of allergies, asthma or eczema.     Patient presents to urgent care reporting a diffuse red and itchy rash all over her body that started last night and has been gradually worsening. She just finished a course of Augmentin for a suspected sinus infection. She denies facial swelling, throat tightness, or difficulty breathing. She took 4 tabs of benadryl PTA with minimal improvement. She also reports ongoing left sided headaches. No visual change, jaw pain, nausea, vomiting, photophobia, or neck pain. She has been taking OTC nsaids and tylenol with minimal relief. No recent head trauma.     Review of Systems   Constitutional: Negative for chills and fever.   HENT: Negative for congestion.    Respiratory: Negative for cough, sputum production and shortness of breath.    Cardiovascular: Negative for chest pain.   Gastrointestinal: Negative for abdominal pain, diarrhea, nausea and vomiting.   Genitourinary: Negative.    Musculoskeletal: Negative.  Negative for joint pain.   Skin: Positive for itching and rash. Negative for nail changes.   Neurological: Negative for dizziness.        Objective:     /100   Pulse (!) 105   Temp 37 °C (98.6 °F) (Temporal)   Resp 16   Ht 1.689 m (5' 6.5\")   Wt 72.6 kg (160 lb)   SpO2 98%   BMI 25.44 kg/m²      Physical Exam   Constitutional: She is oriented to person, place, and time. She appears " well-developed and well-nourished. No distress.   HENT:   Head: Normocephalic and atraumatic.   Right Ear: External ear normal.   Left Ear: External ear normal.   Nose: Mucosal edema present. Right sinus exhibits no maxillary sinus tenderness and no frontal sinus tenderness. Left sinus exhibits no maxillary sinus tenderness and no frontal sinus tenderness.   Mouth/Throat: Oropharynx is clear and moist. No oropharyngeal exudate, posterior oropharyngeal edema or posterior oropharyngeal erythema.   No TTP over left temporal artery    Eyes: Pupils are equal, round, and reactive to light. Conjunctivae, EOM and lids are normal. Right eye exhibits no discharge. Left eye exhibits no discharge.   Neck: Normal range of motion.   Cardiovascular: Normal rate, regular rhythm and normal heart sounds.   No murmur heard.  Pulmonary/Chest: Effort normal. No stridor. No respiratory distress. She has no wheezes.   Musculoskeletal: Normal range of motion.   Neurological: She is alert and oriented to person, place, and time.   Skin: Skin is warm and dry. Rash noted. Rash is urticarial. She is not diaphoretic.        Psychiatric: She has a normal mood and affect. Her behavior is normal.   Nursing note and vitals reviewed.         PMH:  has a past medical history of Asthma, Diabetes mellitus type 2 in nonobese (Formerly [facility]), History of mammogram, Hyperlipidemia, Irritable bowel syndrome, Pelvic exam, S/P colonoscopy, and Vitamin D deficiency. She also has no past medical history of Breast cancer (Formerly [facility]) or Encounter for long-term (current) use of other medications.  MEDS:   Current Outpatient Medications:   •  triamcinolone (ARISTOCORT) 0.5 % ointment, Apply 1 Application to affected area(s) 2 times a day., Disp: 60 g, Rfl: 0  •  hydroCHLOROthiazide (HYDRODIURIL) 25 MG Tab, TAKE 1 TABLET ORALLY EVERY DAY., Disp: 90 Tab, Rfl: 0  •  buPROPion SR (WELLBUTRIN-SR) 150 MG TABLET SR 12 HR sustained-release tablet, Take 1 Tab by mouth every day., Disp: 90  Tab, Rfl: 0  •  pantoprazole (PROTONIX) 40 MG Tablet Delayed Response, Take 1 Tab by mouth every day., Disp: 90 Tab, Rfl: 0  •  metFORMIN ER (GLUCOPHAGE XR) 500 MG TABLET SR 24 HR, Take 1,000 mg by mouth every day., Disp: , Rfl:   •  pioglitazone (ACTOS) 30 MG Tab, Take 1 Tab by mouth every day., Disp: 90 Tab, Rfl: 3  •  amLODIPine (NORVASC) 10 MG Tab, Take 1 Tab by mouth every day., Disp: 90 Tab, Rfl: 1  •  Cholecalciferol (VITAMIN D3) 5000 UNIT TABS, Take 1,000 mg by mouth., Disp: , Rfl:   ALLERGIES:   Allergies   Allergen Reactions   • Ace Inhibitors Swelling   • Demerol Rash     Rash     • Fiorinal Rash     Rash     • Minipress [Fd&C Blue #1-Fd&C Red #40-Prazosin] Unspecified     Cannot remember reaction   • Prednisone Unspecified     Migraine, joint pain swelling   • Statins [Hmg-Coa-R Inhibitors]      Severe cramps   • Trilipix [Choline Fenofibrate]      Angioedema, hives   • Vicodin [Hydrocodone-Acetaminophen] Rash     Rash       SURGHX:   Past Surgical History:   Procedure Laterality Date   • SOLO BY LAPAROSCOPY     • CYSTECTOMY     • DILATION AND CURETTAGE      x4   • HYSTERECTOMY, TOTAL ABDOMINAL      took everything     SOCHX:  reports that she quit smoking about 38 years ago. Her smoking use included cigarettes. She has a 24.00 pack-year smoking history. She has never used smokeless tobacco. She reports that she does not drink alcohol or use drugs.  FH: family history includes Hypertension in her mother; Other in her father.       Assessment/Plan:     1. Urticarial rash  - triamcinolone (ARISTOCORT) 0.5 % ointment; Apply 1 Application to affected area(s) 2 times a day.  Dispense: 60 g; Refill: 0    Patient unable to take oral steroids due to adverse reaction of severe joint pains in the past. Trial of topical triamcinolone provided at today's visit. Encouraged to continue OTC benadryl every 4-6 hours as needed, although discussed maximum dose of 1-2 tabs, do not exceed 12 tabs daily. Call or return to  office if symptoms persist or worsen. The patient demonstrated a good understanding and agreed with the treatment plan.    2. Chronic intractable headache, unspecified headache type    - WESTERGREN SED RATE; Future    Discussed with patient concern for possible temporal arteritis, will obtain ESR today, pending results. The patient demonstrated a good understanding and agreed with the treatment plan.

## 2019-11-01 ENCOUNTER — TELEPHONE (OUTPATIENT)
Dept: URGENT CARE | Facility: PHYSICIAN GROUP | Age: 69
End: 2019-11-01

## 2019-11-02 DIAGNOSIS — I10 ESSENTIAL HYPERTENSION: ICD-10-CM

## 2019-11-02 NOTE — TELEPHONE ENCOUNTER
Left message for patient informing her of normal western sed rate results. No concern for temporal arteritis at this time. Encouraged to return my call with any questions or concerns.

## 2019-11-04 RX ORDER — AMLODIPINE BESYLATE 10 MG/1
TABLET ORAL
Qty: 90 TAB | Refills: 1 | Status: SHIPPED
Start: 2019-11-04 | End: 2020-03-10

## 2019-11-05 NOTE — TELEPHONE ENCOUNTER
Refill X 6 months, sent to pharmacy.Pt. Seen in the last 6 months per protocol.   Lab Results   Component Value Date/Time    SODIUM 139 08/13/2019 07:05 AM    POTASSIUM 3.8 08/13/2019 07:05 AM    CHLORIDE 102 08/13/2019 07:05 AM    CO2 25 08/13/2019 07:05 AM    GLUCOSE 142 (H) 08/13/2019 07:05 AM    BUN 21 08/13/2019 07:05 AM    CREATININE 0.93 08/13/2019 07:05 AM    CREATININE 0.75 01/24/2012 11:53 AM    BUNCREATRAT 24 01/24/2012 11:53 AM

## 2019-11-19 ENCOUNTER — HOSPITAL ENCOUNTER (OUTPATIENT)
Dept: LAB | Facility: MEDICAL CENTER | Age: 69
End: 2019-11-19
Attending: NURSE PRACTITIONER
Payer: MEDICARE

## 2019-11-19 DIAGNOSIS — E11.8 TYPE 2 DIABETES MELLITUS WITH COMPLICATION, WITHOUT LONG-TERM CURRENT USE OF INSULIN (HCC): ICD-10-CM

## 2019-11-19 PROCEDURE — 36415 COLL VENOUS BLD VENIPUNCTURE: CPT | Mod: GA

## 2019-11-19 PROCEDURE — 83036 HEMOGLOBIN GLYCOSYLATED A1C: CPT | Mod: GA

## 2019-11-20 LAB
EST. AVERAGE GLUCOSE BLD GHB EST-MCNC: 146 MG/DL
HBA1C MFR BLD: 6.7 % (ref 0–5.6)

## 2019-11-26 ENCOUNTER — OFFICE VISIT (OUTPATIENT)
Dept: MEDICAL GROUP | Facility: PHYSICIAN GROUP | Age: 69
End: 2019-11-26
Payer: MEDICARE

## 2019-11-26 VITALS
TEMPERATURE: 98.2 F | SYSTOLIC BLOOD PRESSURE: 126 MMHG | HEART RATE: 76 BPM | DIASTOLIC BLOOD PRESSURE: 78 MMHG | BODY MASS INDEX: 26.03 KG/M2 | RESPIRATION RATE: 16 BRPM | HEIGHT: 66 IN | WEIGHT: 162 LBS | OXYGEN SATURATION: 96 %

## 2019-11-26 DIAGNOSIS — E11.8 TYPE 2 DIABETES MELLITUS WITH COMPLICATION, WITHOUT LONG-TERM CURRENT USE OF INSULIN (HCC): ICD-10-CM

## 2019-11-26 PROCEDURE — 99214 OFFICE O/P EST MOD 30 MIN: CPT | Performed by: NURSE PRACTITIONER

## 2019-11-26 NOTE — PROGRESS NOTES
Chief Complaint   Patient presents with   • Lab Results   • Diabetes         This is a 69 y.o.female patient that presents today with the following: Follow-up visit, review labs    Type 2 diabetes mellitus with complication, without long-term current use of insulin (HCC)  This is a chronic condition, stable and fairly well controlled on current medications, metformin 1000 mg daily.  Last known A1c was 7.1% it is now down to 6.7%.  She was previously on pioglitazone but stopped taking due to intolerance.  She has increased her physical activity and is starting to eat healthier.  She is not on statin or ACE inhibitor and continues to decline these medications.  She is due for monofilament exam, this is completed today.  She is going to follow-up with me in the next 3 to 4 months with labs and before visit.      Hospital Outpatient Visit on 2019   Component Date Value   • Glycohemoglobin 2019 6.7*   • Est Avg Glucose 2019 146    Hospital Outpatient Visit on 10/31/2019   Component Date Value   • Sed Rate Westergren 10/31/2019 8          clinical course has been stable    Past Medical History:   Diagnosis Date   • Asthma    • Diabetes mellitus type 2 in nonobese (HCC)    • History of mammogram     fibrocystic breast disease   • Hyperlipidemia    • Irritable bowel syndrome    • Pelvic exam     ASCUS, then had hysterectomy   • S/P colonoscopy        • Vitamin D deficiency        Past Surgical History:   Procedure Laterality Date   • SOLO BY LAPAROSCOPY     • CYSTECTOMY     • DILATION AND CURETTAGE      x4   • HYSTERECTOMY, TOTAL ABDOMINAL      took everything       Family History   Problem Relation Age of Onset   • Hypertension Mother    • Other Father         RA,  age 40       Ace inhibitors; Augmentin; Demerol; Fiorinal; Minipress [fd&c blue #1-fd&c red #40-prazosin]; Prednisone; Statins [hmg-coa-r inhibitors]; Trilipix [choline fenofibrate]; and Vicodin [hydrocodone-acetaminophen]    Current  "Outpatient Medications Ordered in Epic   Medication Sig Dispense Refill   • amLODIPine (NORVASC) 10 MG Tab TAKE 1 TABLET ORALLY EVERY DAY. 90 Tab 1   • hydroCHLOROthiazide (HYDRODIURIL) 25 MG Tab TAKE 1 TABLET ORALLY EVERY DAY. 90 Tab 0   • buPROPion SR (WELLBUTRIN-SR) 150 MG TABLET SR 12 HR sustained-release tablet Take 1 Tab by mouth every day. 90 Tab 0   • pantoprazole (PROTONIX) 40 MG Tablet Delayed Response Take 1 Tab by mouth every day. 90 Tab 0   • metFORMIN ER (GLUCOPHAGE XR) 500 MG TABLET SR 24 HR Take 1,000 mg by mouth every day.     • Cholecalciferol (VITAMIN D3) 5000 UNIT TABS Take 1,000 mg by mouth.       No current Epic-ordered facility-administered medications on file.        Constitutional ROS: No unexpected change in weight, No weakness, No unexplained fevers, sweats, or chills  Pulmonary ROS: No chronic cough, sputum, or hemoptysis, No shortness of breath, No recent change in breathing  Cardiovascular ROS: No chest pain  Gastrointestinal ROS: No abdominal pain, No nausea, vomiting, diarrhea, or constipation  Musculoskeletal/Extremities ROS: No clubbing, No peripheral edema, No pain, redness or swelling on the joints  Neurologic ROS: Normal development, No seizures, No weakness  Endocrine ROS: Positive per HPI    Physical exam:  /78   Pulse 76   Temp 36.8 °C (98.2 °F) (Temporal)   Resp 16   Ht 1.664 m (5' 5.5\")   Wt 73.5 kg (162 lb)   SpO2 96%   BMI 26.55 kg/m²   General Appearance: Very pleasant elderly female, alert, no distress, mildly overweight, well-groomed  Skin: Skin color, texture, turgor normal. No rashes or lesions.  Lungs: negative findings: normal respiratory rate and rhythm, lungs clear to auscultation  Heart: negative. RRR without murmur, gallop, or rubs.  No ectopy.  Abdomen: Abdomen soft, non-tender. BS normal. No masses,  No organomegaly  Musculoskeletal: negative findings: no evidence of joint instability, no evidence of muscle atrophy, no deformities " present  Neurologic: intact, CN II through XII grossly intact  Diabetic Foot Exam: No ulcers, erythema or skin lesions present, patient tested with monofilament (10g) and tuning fork found to have decreased sensitivity bilaterally throughout the ball of the foot, great toe and heel.      Medical decision making/discussion: We will not make changes to her diabetes regimen, she is to continue taking medications as prescribed and follow-up with me in 4 months with labs done before visit.  She can call for medication refills as needed.  She is to continue with healthy lifestyle modifications.    Ting was seen today for lab results and diabetes.    Diagnoses and all orders for this visit:    Type 2 diabetes mellitus with complication, without long-term current use of insulin (HCC)  -     Diabetic Monofilament Lower Extremity Exam  -     HEMOGLOBIN A1C; Future  -     MICROALBUMIN CREAT RATIO URINE; Future  -     Lipid Profile; Future  -     Comp Metabolic Panel; Future        Return in about 4 months (around 3/26/2020) for Follow-up, Discuss Labs.        Please note that this dictation was created using voice recognition software. I have made every reasonable attempt to correct obvious errors, but I expect that there are errors of grammar and possibly content that I did not discover before finalizing the note.

## 2019-11-26 NOTE — ASSESSMENT & PLAN NOTE
This is a chronic condition, stable and fairly well controlled on current medications, metformin 1000 mg daily.  Last known A1c was 7.1% it is now down to 6.7%.  She was previously on pioglitazone but stopped taking due to intolerance.  She has increased her physical activity and is starting to eat healthier.  She is not on statin or ACE inhibitor and continues to decline these medications.  She is due for monofilament exam, this is completed today.  She is going to follow-up with me in the next 3 to 4 months with labs and before visit.

## 2019-12-19 RX ORDER — PANTOPRAZOLE SODIUM 40 MG/1
40 TABLET, DELAYED RELEASE ORAL DAILY
Qty: 90 TAB | Refills: 0 | Status: SHIPPED | OUTPATIENT
Start: 2019-12-19 | End: 2020-03-18 | Stop reason: SDUPTHER

## 2020-01-15 DIAGNOSIS — F33.1 MAJOR DEPRESSIVE DISORDER, RECURRENT EPISODE, MODERATE (HCC): ICD-10-CM

## 2020-01-15 RX ORDER — BUPROPION HYDROCHLORIDE 150 MG/1
150 TABLET, EXTENDED RELEASE ORAL
Qty: 90 TAB | Refills: 0 | Status: CANCELLED | OUTPATIENT
Start: 2020-01-15

## 2020-01-17 DIAGNOSIS — F33.1 MAJOR DEPRESSIVE DISORDER, RECURRENT EPISODE, MODERATE (HCC): ICD-10-CM

## 2020-01-17 RX ORDER — BUPROPION HYDROCHLORIDE 150 MG/1
150 TABLET, EXTENDED RELEASE ORAL
Qty: 90 TAB | Refills: 0 | Status: SHIPPED | OUTPATIENT
Start: 2020-01-17 | End: 2020-04-03 | Stop reason: SDUPTHER

## 2020-01-29 DIAGNOSIS — I10 ESSENTIAL HYPERTENSION: ICD-10-CM

## 2020-01-29 RX ORDER — HYDROCHLOROTHIAZIDE 25 MG/1
25 TABLET ORAL
Qty: 90 TAB | Refills: 1 | Status: SHIPPED
Start: 2020-01-29 | End: 2020-03-10

## 2020-01-29 NOTE — TELEPHONE ENCOUNTER
Was the patient seen in the last year in this department? Yes    Does patient have an active prescription for medications requested? No     Received Request Via: Pharmacy    Pt met protocol?: Yes     Last OV 11/26/19    BP Readings from Last 1 Encounters:   11/26/19 126/78

## 2020-03-03 ENCOUNTER — HOSPITAL ENCOUNTER (OUTPATIENT)
Dept: LAB | Facility: MEDICAL CENTER | Age: 70
End: 2020-03-03
Attending: NURSE PRACTITIONER
Payer: MEDICARE

## 2020-03-03 DIAGNOSIS — E11.8 TYPE 2 DIABETES MELLITUS WITH COMPLICATION, WITHOUT LONG-TERM CURRENT USE OF INSULIN (HCC): ICD-10-CM

## 2020-03-03 LAB
CREAT UR-MCNC: 128.6 MG/DL
MICROALBUMIN UR-MCNC: 2.8 MG/DL
MICROALBUMIN/CREAT UR: 22 MG/G (ref 0–30)

## 2020-03-03 PROCEDURE — 82570 ASSAY OF URINE CREATININE: CPT

## 2020-03-03 PROCEDURE — 83036 HEMOGLOBIN GLYCOSYLATED A1C: CPT | Mod: GZ

## 2020-03-03 PROCEDURE — 82043 UR ALBUMIN QUANTITATIVE: CPT

## 2020-03-03 PROCEDURE — 80061 LIPID PANEL: CPT

## 2020-03-03 PROCEDURE — 80053 COMPREHEN METABOLIC PANEL: CPT

## 2020-03-03 PROCEDURE — 36415 COLL VENOUS BLD VENIPUNCTURE: CPT

## 2020-03-04 LAB
ALBUMIN SERPL BCP-MCNC: 4.6 G/DL (ref 3.2–4.9)
ALBUMIN/GLOB SERPL: 1.8 G/DL
ALP SERPL-CCNC: 67 U/L (ref 30–99)
ALT SERPL-CCNC: 26 U/L (ref 2–50)
ANION GAP SERPL CALC-SCNC: 11 MMOL/L (ref 0–11.9)
AST SERPL-CCNC: 19 U/L (ref 12–45)
BILIRUB SERPL-MCNC: 0.6 MG/DL (ref 0.1–1.5)
BUN SERPL-MCNC: 15 MG/DL (ref 8–22)
CALCIUM SERPL-MCNC: 9.8 MG/DL (ref 8.5–10.5)
CHLORIDE SERPL-SCNC: 100 MMOL/L (ref 96–112)
CHOLEST SERPL-MCNC: 218 MG/DL (ref 100–199)
CO2 SERPL-SCNC: 26 MMOL/L (ref 20–33)
CREAT SERPL-MCNC: 0.99 MG/DL (ref 0.5–1.4)
EST. AVERAGE GLUCOSE BLD GHB EST-MCNC: 169 MG/DL
FASTING STATUS PATIENT QL REPORTED: NORMAL
GLOBULIN SER CALC-MCNC: 2.5 G/DL (ref 1.9–3.5)
GLUCOSE SERPL-MCNC: 141 MG/DL (ref 65–99)
HBA1C MFR BLD: 7.5 % (ref 0–5.6)
HDLC SERPL-MCNC: 44 MG/DL
LDLC SERPL CALC-MCNC: 130 MG/DL
POTASSIUM SERPL-SCNC: 3.6 MMOL/L (ref 3.6–5.5)
PROT SERPL-MCNC: 7.1 G/DL (ref 6–8.2)
SODIUM SERPL-SCNC: 137 MMOL/L (ref 135–145)
TRIGL SERPL-MCNC: 220 MG/DL (ref 0–149)

## 2020-03-10 ENCOUNTER — OFFICE VISIT (OUTPATIENT)
Dept: MEDICAL GROUP | Facility: PHYSICIAN GROUP | Age: 70
End: 2020-03-10
Payer: MEDICARE

## 2020-03-10 VITALS
DIASTOLIC BLOOD PRESSURE: 92 MMHG | RESPIRATION RATE: 16 BRPM | TEMPERATURE: 98.1 F | OXYGEN SATURATION: 98 % | HEART RATE: 96 BPM | SYSTOLIC BLOOD PRESSURE: 146 MMHG | HEIGHT: 66 IN | BODY MASS INDEX: 25.39 KG/M2 | WEIGHT: 158 LBS

## 2020-03-10 DIAGNOSIS — E78.2 MIXED HYPERLIPIDEMIA: ICD-10-CM

## 2020-03-10 DIAGNOSIS — I10 ESSENTIAL HYPERTENSION: ICD-10-CM

## 2020-03-10 DIAGNOSIS — E11.8 TYPE 2 DIABETES MELLITUS WITH COMPLICATION, WITHOUT LONG-TERM CURRENT USE OF INSULIN (HCC): ICD-10-CM

## 2020-03-10 PROCEDURE — 99214 OFFICE O/P EST MOD 30 MIN: CPT | Performed by: NURSE PRACTITIONER

## 2020-03-10 RX ORDER — ATENOLOL AND CHLORTHALIDONE TABLET 50; 25 MG/1; MG/1
1 TABLET ORAL DAILY
Qty: 90 TAB | Refills: 3 | Status: SHIPPED | OUTPATIENT
Start: 2020-03-10 | End: 2021-02-25

## 2020-03-10 NOTE — PATIENT INSTRUCTIONS
Stop the amlodipine and HCTZ, will have you start tenoretic (atenolol and chlorthalidone)    Follow up in 4 months with labs    BP check in 1-2 weeks

## 2020-03-10 NOTE — ASSESSMENT & PLAN NOTE
This is a chronic condition, stable and suboptimally controlled on current medication including metformin extended release 1000 mg daily her A1c has risen to 7.5%, this is up from 6.7% about 6 months ago.  She does admit to very poor eating and lack of exercise, she has been under tremendous amount of stress with personal issues but is going to get back on track with healthy eating and regular physical activity.  She declines medication adjustment today.  She does understand the risks associated with uncontrolled type 2 diabetes.  Continues to decline statin and ACE inhibitor.

## 2020-03-10 NOTE — PROGRESS NOTES
Chief Complaint   Patient presents with   • Diabetes         This is a 69 y.o.female patient that presents today with the following: Diabetes follow-up    Hypertension  This is a chronic condition, stable and usually well controlled on medications including amlodipine and hydrochlorothiazide.  Blood pressure mildly elevated today but patient has been not been taking medications consistently specifically the amlodipine and she feels this is causing her to have a metallic taste and decreasing her appetite.  She does agree to medication adjustment, will have her stop amlodipine and hydrochlorothiazide and will have her start atenolol chlorthalidone and follow-up in 1 week for blood pressure check.  She is asymptomatic of her hypertension, she is up-to-date with labs.  She is otherwise follow-up with me in 6 months, sooner if needed.    Hyperlipidemia  The 10-year ASCVD risk score (Screven DC Jr., et al., 2013) is: 26%  Patient continues to decline statin, has been working on lifestyle modification and has improved her LDL from 150 down to 130, she does understand the risks of not taking cholesterol-lowering medication, she wishes to continue with lifestyle modifications.    Type 2 diabetes mellitus with complication, without long-term current use of insulin (HCC)  This is a chronic condition, stable and suboptimally controlled on current medication including metformin extended release 1000 mg daily her A1c has risen to 7.5%, this is up from 6.7% about 6 months ago.  She does admit to very poor eating and lack of exercise, she has been under tremendous amount of stress with personal issues but is going to get back on track with healthy eating and regular physical activity.  She declines medication adjustment today.  She does understand the risks associated with uncontrolled type 2 diabetes.  Continues to decline statin and ACE inhibitor.      Hospital Outpatient Visit on 03/03/2020   Component Date Value   • Sodium  2020 137    • Potassium 2020 3.6    • Chloride 2020 100    • Co2 2020 26    • Anion Gap 2020 11.0    • Glucose 2020 141*   • Bun 2020 15    • Creatinine 2020 0.99    • Calcium 2020 9.8    • AST(SGOT) 2020 19    • ALT(SGPT) 2020 26    • Alkaline Phosphatase 2020 67    • Total Bilirubin 2020 0.6    • Albumin 2020 4.6    • Total Protein 2020 7.1    • Globulin 2020 2.5    • A-G Ratio 2020 1.8    • Cholesterol,Tot 2020 218*   • Triglycerides 2020 220*   • HDL 2020 44    • LDL 2020 130*   • Creatinine, Urine 2020 128.60    • Microalbumin, Urine Rand* 2020 2.8    • Micro Alb Creat Ratio 2020 22    • Glycohemoglobin 2020 7.5*   • Est Avg Glucose 2020 169    • Fasting Status 2020 Fasting    • GFR If  2020 >60    • GFR If Non  Ameri* 2020 56*         clinical course has been stable    Past Medical History:   Diagnosis Date   • Asthma    • Diabetes mellitus type 2 in nonobese (HCC)    • History of mammogram     fibrocystic breast disease   • Hyperlipidemia    • Irritable bowel syndrome    • Pelvic exam     ASCUS, then had hysterectomy   • S/P colonoscopy        • Vitamin D deficiency        Past Surgical History:   Procedure Laterality Date   • SOLO BY LAPAROSCOPY     • CYSTECTOMY     • DILATION AND CURETTAGE      x4   • HYSTERECTOMY, TOTAL ABDOMINAL      took everything       Family History   Problem Relation Age of Onset   • Hypertension Mother    • Other Father         RA,  age 40       Ace inhibitors; Augmentin; Demerol; Fiorinal; Minipress [fd&c blue #1-fd&c red #40-prazosin]; Prednisone; Statins [hmg-coa-r inhibitors]; Trilipix [choline fenofibrate]; and Vicodin [hydrocodone-acetaminophen]    Current Outpatient Medications Ordered in Epic   Medication Sig Dispense Refill   • atenolol-chlorthalidone (TENORETIC) 50-25 MG  "per tablet Take 1 Tab by mouth every day. 90 Tab 3   • buPROPion SR (WELLBUTRIN-SR) 150 MG TABLET SR 12 HR sustained-release tablet Take 1 Tab by mouth every day. 90 Tab 0   • pantoprazole (PROTONIX) 40 MG Tablet Delayed Response Take 1 Tab by mouth every day. 90 Tab 0   • metFORMIN ER (GLUCOPHAGE XR) 500 MG TABLET SR 24 HR Take 1,000 mg by mouth every day.     • Cholecalciferol (VITAMIN D3) 5000 UNIT TABS Take 1,000 mg by mouth.       No current Epic-ordered facility-administered medications on file.        Constitutional ROS: No unexpected change in weight, No weakness, No unexplained fevers, sweats, or chills  Pulmonary ROS: No chronic cough, sputum, or hemoptysis, No shortness of breath, No recent change in breathing  Cardiovascular ROS: No chest pain, No edema, No palpitations, Positive for hypertension and hyperlipidemia  Gastrointestinal ROS: No abdominal pain, No nausea, vomiting, diarrhea, or constipation  Musculoskeletal/Extremities ROS: No clubbing, No peripheral edema, No pain, redness or swelling on the joints  Neurologic ROS: Normal development, No seizures, No weakness  Endocrine ROS: Positive per HPI    Physical exam:  /92   Pulse 96   Temp 36.7 °C (98.1 °F) (Temporal)   Resp 16   Ht 1.664 m (5' 5.5\")   Wt 71.7 kg (158 lb)   SpO2 98%   BMI 25.89 kg/m²   General Appearance: Very pleasant older female, alert, no distress, well-nourished, well-groomed  Skin: Skin color, texture, turgor normal. No rashes or lesions.  Lungs: negative findings: normal respiratory rate and rhythm, lungs clear to auscultation  Heart: negative. RRR without murmur, gallop, or rubs.  No ectopy.  Abdomen: Abdomen soft, non-tender. BS normal. No masses,  No organomegaly  Musculoskeletal: negative findings: no evidence of joint instability, no evidence of muscle atrophy, no deformities present  Neurologic: intact, CN II through XII grossly intact    Medical decision making/discussion:     Stop the amlodipine and HCTZ, " will have you start tenoretic (atenolol and chlorthalidone)    Follow up in 4 months with labs    BP check in 1-2 weeks      Ting was seen today for diabetes.    Diagnoses and all orders for this visit:    Type 2 diabetes mellitus with complication, without long-term current use of insulin (HCC)  -     HEMOGLOBIN A1C; Future  -     Basic Metabolic Panel; Future    Essential hypertension  -     atenolol-chlorthalidone (TENORETIC) 50-25 MG per tablet; Take 1 Tab by mouth every day.  -     Basic Metabolic Panel; Future    Mixed hyperlipidemia        Return in about 4 months (around 7/10/2020) for Follow-up.        Please note that this dictation was created using voice recognition software. I have made every reasonable attempt to correct obvious errors, but I expect that there are errors of grammar and possibly content that I did not discover before finalizing the note.

## 2020-03-10 NOTE — ASSESSMENT & PLAN NOTE
This is a chronic condition, stable and usually well controlled on medications including amlodipine and hydrochlorothiazide.  Blood pressure mildly elevated today but patient has been not been taking medications consistently specifically the amlodipine and she feels this is causing her to have a metallic taste and decreasing her appetite.  She does agree to medication adjustment, will have her stop amlodipine and hydrochlorothiazide and will have her start atenolol chlorthalidone and follow-up in 1 week for blood pressure check.  She is asymptomatic of her hypertension, she is up-to-date with labs.  She is otherwise follow-up with me in 6 months, sooner if needed.

## 2020-03-18 RX ORDER — PANTOPRAZOLE SODIUM 40 MG/1
40 TABLET, DELAYED RELEASE ORAL DAILY
Qty: 90 TAB | Refills: 1 | Status: SHIPPED | OUTPATIENT
Start: 2020-03-18 | End: 2020-09-08 | Stop reason: SDUPTHER

## 2020-03-18 NOTE — TELEPHONE ENCOUNTER
Was the patient seen in the last year in this department? Yes    Does patient have an active prescription for medications requested? No     Received Request Via: Pharmacy      Pt met protocol?: Yes, OV last week

## 2020-03-19 ENCOUNTER — NON-PROVIDER VISIT (OUTPATIENT)
Dept: MEDICAL GROUP | Facility: PHYSICIAN GROUP | Age: 70
End: 2020-03-19
Payer: MEDICARE

## 2020-03-19 VITALS — SYSTOLIC BLOOD PRESSURE: 134 MMHG | DIASTOLIC BLOOD PRESSURE: 84 MMHG

## 2020-04-03 DIAGNOSIS — F33.1 MAJOR DEPRESSIVE DISORDER, RECURRENT EPISODE, MODERATE (HCC): ICD-10-CM

## 2020-04-03 RX ORDER — BUPROPION HYDROCHLORIDE 150 MG/1
150 TABLET, EXTENDED RELEASE ORAL
Qty: 90 TAB | Refills: 0 | Status: SHIPPED | OUTPATIENT
Start: 2020-04-03 | End: 2020-07-07 | Stop reason: SDUPTHER

## 2020-07-07 ENCOUNTER — HOSPITAL ENCOUNTER (OUTPATIENT)
Dept: LAB | Facility: MEDICAL CENTER | Age: 70
End: 2020-07-07
Attending: NURSE PRACTITIONER
Payer: MEDICARE

## 2020-07-07 DIAGNOSIS — F33.1 MAJOR DEPRESSIVE DISORDER, RECURRENT EPISODE, MODERATE (HCC): ICD-10-CM

## 2020-07-07 DIAGNOSIS — E11.8 TYPE 2 DIABETES MELLITUS WITH COMPLICATION, WITHOUT LONG-TERM CURRENT USE OF INSULIN (HCC): ICD-10-CM

## 2020-07-07 DIAGNOSIS — I10 ESSENTIAL HYPERTENSION: ICD-10-CM

## 2020-07-07 LAB
ANION GAP SERPL CALC-SCNC: 15 MMOL/L (ref 7–16)
BUN SERPL-MCNC: 16 MG/DL (ref 8–22)
CALCIUM SERPL-MCNC: 9.6 MG/DL (ref 8.5–10.5)
CHLORIDE SERPL-SCNC: 98 MMOL/L (ref 96–112)
CO2 SERPL-SCNC: 25 MMOL/L (ref 20–33)
CREAT SERPL-MCNC: 0.97 MG/DL (ref 0.5–1.4)
EST. AVERAGE GLUCOSE BLD GHB EST-MCNC: 186 MG/DL
GLUCOSE SERPL-MCNC: 169 MG/DL (ref 65–99)
HBA1C MFR BLD: 8.1 % (ref 0–5.6)
POTASSIUM SERPL-SCNC: 4 MMOL/L (ref 3.6–5.5)
SODIUM SERPL-SCNC: 138 MMOL/L (ref 135–145)

## 2020-07-07 PROCEDURE — 36415 COLL VENOUS BLD VENIPUNCTURE: CPT

## 2020-07-07 PROCEDURE — 80048 BASIC METABOLIC PNL TOTAL CA: CPT

## 2020-07-07 PROCEDURE — 83036 HEMOGLOBIN GLYCOSYLATED A1C: CPT | Mod: GA

## 2020-07-07 RX ORDER — BUPROPION HYDROCHLORIDE 150 MG/1
150 TABLET, EXTENDED RELEASE ORAL
Qty: 90 TAB | Refills: 3 | Status: SHIPPED | OUTPATIENT
Start: 2020-07-07 | End: 2020-12-01 | Stop reason: SDUPTHER

## 2020-07-14 ENCOUNTER — OFFICE VISIT (OUTPATIENT)
Dept: MEDICAL GROUP | Facility: PHYSICIAN GROUP | Age: 70
End: 2020-07-14
Payer: MEDICARE

## 2020-07-14 VITALS
SYSTOLIC BLOOD PRESSURE: 124 MMHG | BODY MASS INDEX: 24.27 KG/M2 | HEART RATE: 67 BPM | TEMPERATURE: 98.1 F | DIASTOLIC BLOOD PRESSURE: 80 MMHG | OXYGEN SATURATION: 97 % | WEIGHT: 151 LBS | HEIGHT: 66 IN | RESPIRATION RATE: 14 BRPM

## 2020-07-14 DIAGNOSIS — Z66 DO NOT RESUSCITATE STATUS: ICD-10-CM

## 2020-07-14 DIAGNOSIS — E11.8 TYPE 2 DIABETES MELLITUS WITH COMPLICATION, WITHOUT LONG-TERM CURRENT USE OF INSULIN (HCC): ICD-10-CM

## 2020-07-14 PROCEDURE — 99214 OFFICE O/P EST MOD 30 MIN: CPT | Performed by: NURSE PRACTITIONER

## 2020-07-14 NOTE — PROGRESS NOTES
Chief Complaint   Patient presents with   • Diabetes         This is a 69 y.o.female patient that presents today with the following: Diabetes follow-up, review labs    DNR (do not resuscitate)  Patient here to update DNR status  POLST form updated and scanned record  She does continue to desire to have DNR status  She understands fully the decision she has made, she has made these with her son  She will later bring in a copy of her advance directives/power of     Type 2 diabetes mellitus with complication, without long-term current use of insulin (HCC)  Chronic condition, uncontrolled  A1c was previously at 7.5% it is up to 8.1%  Is on metformin extended release at thousand milligrams twice daily  Does admit to poor eating as of late  Patient has been very hesitant to add any medications as she is tends to have side effects with almost anything she takes per her report  She does agree to adding Jardiance 25 mg daily, she was advised to continue on the metformin  She is not on ACE inhibitor or statin as she continues to decline these medications and understands the risks of doing so  Patient will follow-up in 4 months with labs and before visit      Hospital Outpatient Visit on 07/07/2020   Component Date Value   • Sodium 07/07/2020 138    • Potassium 07/07/2020 4.0    • Chloride 07/07/2020 98    • Co2 07/07/2020 25    • Glucose 07/07/2020 169*   • Bun 07/07/2020 16    • Creatinine 07/07/2020 0.97    • Calcium 07/07/2020 9.6    • Anion Gap 07/07/2020 15.0    • Glycohemoglobin 07/07/2020 8.1*   • Est Avg Glucose 07/07/2020 186    • GFR If  07/07/2020 >60    • GFR If Non  Ameri* 07/07/2020 57*         clinical course has been stable    Past Medical History:   Diagnosis Date   • Asthma    • Diabetes mellitus type 2 in nonobese (HCC)    • History of mammogram     fibrocystic breast disease   • Hyperlipidemia    • Irritable bowel syndrome    • Pelvic exam     ASCUS, then had hysterectomy   •  S/P colonoscopy        • Vitamin D deficiency        Past Surgical History:   Procedure Laterality Date   • SOLO BY LAPAROSCOPY     • CYSTECTOMY     • DILATION AND CURETTAGE      x4   • HYSTERECTOMY, TOTAL ABDOMINAL      took everything       Family History   Problem Relation Age of Onset   • Hypertension Mother    • Other Father         RA,  age 40       Ace inhibitors; Augmentin; Demerol; Fiorinal; Minipress [fd&c blue #1-fd&c red #40-prazosin]; Prednisone; Statins [hmg-coa-r inhibitors]; Trilipix [choline fenofibrate]; and Vicodin [hydrocodone-acetaminophen]    Current Outpatient Medications Ordered in Epic   Medication Sig Dispense Refill   • Empagliflozin 25 MG Tab Take 25 mg by mouth every day. 90 Tab 3   • buPROPion SR (WELLBUTRIN-SR) 150 MG TABLET SR 12 HR sustained-release tablet Take 1 Tab by mouth every day. 90 Tab 3   • pantoprazole (PROTONIX) 40 MG Tablet Delayed Response Take 1 Tab by mouth every day. 90 Tab 1   • atenolol-chlorthalidone (TENORETIC) 50-25 MG per tablet Take 1 Tab by mouth every day. 90 Tab 3   • metFORMIN ER (GLUCOPHAGE XR) 500 MG TABLET SR 24 HR Take 1,000 mg by mouth every day.     • Cholecalciferol (VITAMIN D3) 5000 UNIT TABS Take 1,000 mg by mouth.       No current Epic-ordered facility-administered medications on file.        Constitutional ROS: No unexpected change in weight, No weakness, No unexplained fevers, sweats, or chills  Pulmonary ROS: No chronic cough, sputum, or hemoptysis, No shortness of breath, No recent change in breathing  Cardiovascular ROS: No chest pain, No edema, No palpitations  Gastrointestinal ROS: No abdominal pain, No nausea, vomiting, diarrhea, or constipation  Musculoskeletal/Extremities ROS: No clubbing, No peripheral edema, No pain, redness or swelling on the joints  Neurologic ROS: Normal development, No seizures, No weakness  Endocrine ROS: Positive per HPI    Physical exam:  /80   Pulse 67   Temp 36.7 °C (98.1 °F) (Temporal)    "Resp 14   Ht 1.664 m (5' 5.5\")   Wt 68.5 kg (151 lb)   SpO2 97%   BMI 24.75 kg/m²   General Appearance: Pleasant older female, alert, no distress, well-nourished, well-groomed  Skin: Skin color, texture, turgor normal. No rashes or lesions.  Lungs: negative findings: normal respiratory rate and rhythm, lungs clear to auscultation  Heart: negative. RRR without murmur, gallop, or rubs.  No ectopy.  Abdomen: Abdomen soft, non-tender. BS normal. No masses,  No organomegaly  Musculoskeletal: negative findings: no evidence of joint instability, no evidence of muscle atrophy, no deformities present  Neurologic: intact, CN II through XII grossly intact    Medical decision making/discussion:     Will have you start Jardiance, try and take the metformin, can adjust the dose if needed for side effects    POLST form updated in record    Follow up in 4 months with labs    Ting was seen today for diabetes.    Diagnoses and all orders for this visit:    Type 2 diabetes mellitus with complication, without long-term current use of insulin (HCC)  -     Empagliflozin 25 MG Tab; Take 25 mg by mouth every day.  -     HEMOGLOBIN A1C; Future    DNR (do not resuscitate)        Return in about 4 months (around 11/14/2020) for Discuss Labs, Follow-up.        Please note that this dictation was created using voice recognition software. I have made every reasonable attempt to correct obvious errors, but I expect that there are errors of grammar and possibly content that I did not discover before finalizing the note.        "

## 2020-07-14 NOTE — PATIENT INSTRUCTIONS
Will have you start Jardiance, try and take the metformin, can adjust the dose if needed for side effects    Let me know via [employer] how you do with this    Follow up in 4 months with labs

## 2020-07-14 NOTE — ASSESSMENT & PLAN NOTE
Patient here to update DNR status  POLST form updated and scanned record  She does continue to desire to have DNR status  She understands fully the decision she has made, she has made these with her son  She will later bring in a copy of her advance directives/power of

## 2020-07-14 NOTE — ASSESSMENT & PLAN NOTE
Chronic condition, uncontrolled  A1c was previously at 7.5% it is up to 8.1%  Is on metformin extended release at thousand milligrams twice daily  Does admit to poor eating as of late  Patient has been very hesitant to add any medications as she is tends to have side effects with almost anything she takes per her report  She does agree to adding Jardiance 25 mg daily, she was advised to continue on the metformin  She is not on ACE inhibitor or statin as she continues to decline these medications and understands the risks of doing so  Patient will follow-up in 4 months with labs and before visit

## 2020-09-08 RX ORDER — PANTOPRAZOLE SODIUM 40 MG/1
40 TABLET, DELAYED RELEASE ORAL DAILY
Qty: 90 TAB | Refills: 3 | Status: SHIPPED | OUTPATIENT
Start: 2020-09-08 | End: 2021-09-01

## 2020-11-10 ENCOUNTER — HOSPITAL ENCOUNTER (OUTPATIENT)
Dept: LAB | Facility: MEDICAL CENTER | Age: 70
End: 2020-11-10
Attending: NURSE PRACTITIONER
Payer: MEDICARE

## 2020-11-10 DIAGNOSIS — E11.8 TYPE 2 DIABETES MELLITUS WITH COMPLICATION, WITHOUT LONG-TERM CURRENT USE OF INSULIN (HCC): ICD-10-CM

## 2020-11-10 LAB
EST. AVERAGE GLUCOSE BLD GHB EST-MCNC: 157 MG/DL
HBA1C MFR BLD: 7.1 % (ref 0–5.6)

## 2020-11-10 PROCEDURE — 36415 COLL VENOUS BLD VENIPUNCTURE: CPT | Mod: GA

## 2020-11-10 PROCEDURE — 83036 HEMOGLOBIN GLYCOSYLATED A1C: CPT | Mod: GA

## 2020-11-17 ENCOUNTER — TELEMEDICINE (OUTPATIENT)
Dept: MEDICAL GROUP | Facility: PHYSICIAN GROUP | Age: 70
End: 2020-11-17
Payer: MEDICARE

## 2020-11-17 VITALS
BODY MASS INDEX: 23.3 KG/M2 | SYSTOLIC BLOOD PRESSURE: 122 MMHG | WEIGHT: 145 LBS | TEMPERATURE: 97.3 F | HEIGHT: 66 IN | DIASTOLIC BLOOD PRESSURE: 83 MMHG

## 2020-11-17 DIAGNOSIS — E78.2 MIXED HYPERLIPIDEMIA: ICD-10-CM

## 2020-11-17 DIAGNOSIS — E11.8 TYPE 2 DIABETES MELLITUS WITH COMPLICATION, WITHOUT LONG-TERM CURRENT USE OF INSULIN (HCC): ICD-10-CM

## 2020-11-17 DIAGNOSIS — I10 ESSENTIAL HYPERTENSION: ICD-10-CM

## 2020-11-17 PROCEDURE — 99214 OFFICE O/P EST MOD 30 MIN: CPT | Mod: 95,CR | Performed by: NURSE PRACTITIONER

## 2020-11-17 NOTE — PATIENT INSTRUCTIONS
Increase Wellbutrin to 2 pills daily for next 2 weeks, send me message on how you are doing, if doing good, will call in new Rx    Follow up in 4 months with labs before visit, in office or virtual

## 2020-11-17 NOTE — ASSESSMENT & PLAN NOTE
Pt presents for follow up on diabetes, via VV platform  Last visit was started on jardiance, continues on Metformin  A1c has gone down to 7.1%, previously at 8.1%  Will be due for complete labs before next appt in 4 months  Not on statin or ACEI/ARB--continues to decline these medications

## 2020-11-17 NOTE — PROGRESS NOTES
Virtual Visit: Established Patient   This visit was conducted via Zoom using secure and encrypted videoconferencing technology. The patient was in a private location in the state of [place].    The patient's identity was confirmed and verbal consent was obtained for this virtual visit.    Subjective:   CC:   Chief Complaint   Patient presents with   • Diabetes       Ting Gregory is a 70 y.o. female presenting for evaluation and management of:    Type 2 diabetes mellitus with complication, without long-term current use of insulin ([facility])  Pt presents for follow up on diabetes, via VV platform  Last visit was started on jardiance, continues on Metformin  A1c has gone down to 7.1%, previously at 8.1%  Will be due for complete labs before next appt in 4 months  Not on statin or ACEI/ARB--continues to decline these medications        ROS   Denies any recent fevers or chills. No nausea or vomiting. No chest pains or shortness of breath.   Endo ROS: positive per HPI    Allergies   Allergen Reactions   • Ace Inhibitors Swelling   • Augmentin      Severe rash   • Demerol Rash     Rash     • Fiorinal Rash     Rash     • Minipress [Fd&C Blue #1-Fd&C Red #40-Prazosin] Unspecified     Cannot remember reaction   • Prednisone Unspecified     Migraine, joint pain swelling   • Statins [Hmg-Coa-R Inhibitors]      Severe cramps   • Trilipix [Choline Fenofibrate]      Angioedema, hives   • Vicodin [Hydrocodone-Acetaminophen] Rash     Rash         Current medicines (including changes today)  Current Outpatient Medications   Medication Sig Dispense Refill   • metFORMIN ER (GLUCOPHAGE XR) 500 MG TABLET SR 24 HR TAKE TWO TABLETS BY MOUTH TWICE A DAY (Patient taking differently: 1,000 mg every day.) 360 Tab 3   • pantoprazole (PROTONIX) 40 MG Tablet Delayed Response Take 1 Tab by mouth every day. 90 Tab 3   • Empagliflozin 25 MG Tab Take 25 mg by mouth every day. 90 Tab 3   • buPROPion SR (WELLBUTRIN-SR) 150 MG TABLET SR 12 HR  "sustained-release tablet Take 1 Tab by mouth every day. 90 Tab 3   • atenolol-chlorthalidone (TENORETIC) 50-25 MG per tablet Take 1 Tab by mouth every day. 90 Tab 3   • Cholecalciferol (VITAMIN D3) 5000 UNIT TABS Take 1,000 mg by mouth.       No current facility-administered medications for this visit.        Patient Active Problem List    Diagnosis Date Noted   • Preglaucoma 2019   • Age-related cataract of both eyes 2018   • Health care maintenance 2017   • Allergy to statin medication 2016   • Other chest pain 2016   • Insomnia 2015   • DNR (do not resuscitate) 2015   • DNI (do not intubate) 2015   • Major depressive disorder, recurrent episode, moderate (CMS-HCC) 2015   • Peripheral neuropathy 04/15/2015   • GERD (gastroesophageal reflux disease) 2013   • Hypertension 2012   • Type 2 diabetes mellitus with complication, without long-term current use of insulin ([facility])    • Hyperlipidemia        Family History   Problem Relation Age of Onset   • Hypertension Mother    • Other Father         RA,  age 40       She  has a past medical history of Asthma, Diabetes mellitus type 2 in nonobese ([facility]), History of mammogram, Hyperlipidemia, Irritable bowel syndrome, Pelvic exam, S/P colonoscopy, and Vitamin D deficiency. She also has no past medical history of Breast cancer ([facility]) or Encounter for long-term (current) use of other medications.  She  has a past surgical history that includes hysterectomy, total abdominal; dione by laparoscopy; dilation and curettage; and cystectomy.       Objective:   /83   Temp 36.3 °C (97.3 °F) (Oral)   Ht 1.664 m (5' 5.5\")   Wt 65.8 kg (145 lb)   BMI 23.76 kg/m²     Physical Exam:  Constitutional: Alert, no distress, well-groomed.  Skin: No rashes in visible areas.  Eye: Round. Conjunctiva clear, lids normal. No icterus.   ENMT: Lips pink without lesions, good dentition, moist mucous membranes. Phonation " normal.  Neck: No masses, no thyromegaly. Moves freely without pain.  Respiratory: Unlabored respiratory effort, no cough or audible wheeze  Psych: Alert and oriented x3, normal affect and mood.       Assessment and Plan:   The following treatment plan was discussed:     Increase Wellbutrin to 2 pills daily for next 2 weeks, if better will call in new Rx    Follow up in 4 months with labs before visit, in office or virtual    1. Type 2 diabetes mellitus with complication, without long-term current use of insulin (HCC)  - Comp Metabolic Panel; Future  - HEMOGLOBIN A1C; Future  - Lipid Profile; Future  - MICROALBUMIN CREAT RATIO URINE; Future    2. Essential hypertension  - CBC WITH DIFFERENTIAL; Future  - Comp Metabolic Panel; Future  - Lipid Profile; Future    3. Mixed hyperlipidemia  - Comp Metabolic Panel; Future  - Lipid Profile; Future        Follow-up: Return in about 4 months (around 3/17/2021) for Follow-up, Discuss Labs.

## 2020-12-01 DIAGNOSIS — F33.1 MAJOR DEPRESSIVE DISORDER, RECURRENT EPISODE, MODERATE (HCC): ICD-10-CM

## 2020-12-01 RX ORDER — BUPROPION HYDROCHLORIDE 150 MG/1
300 TABLET, EXTENDED RELEASE ORAL
Qty: 180 TAB | Refills: 3 | Status: SHIPPED | OUTPATIENT
Start: 2020-12-01 | End: 2021-11-23

## 2021-02-24 DIAGNOSIS — T78.2XXS ANAPHYLAXIS, SEQUELA: ICD-10-CM

## 2021-02-24 RX ORDER — EPINEPHRINE 0.3 MG/.3ML
0.3 INJECTION SUBCUTANEOUS ONCE
Qty: 1 EACH | Refills: 1 | Status: SHIPPED | OUTPATIENT
Start: 2021-02-24 | End: 2021-02-24

## 2021-03-11 ENCOUNTER — HOSPITAL ENCOUNTER (OUTPATIENT)
Dept: LAB | Facility: MEDICAL CENTER | Age: 71
End: 2021-03-11
Attending: NURSE PRACTITIONER
Payer: MEDICARE

## 2021-03-11 DIAGNOSIS — E78.2 MIXED HYPERLIPIDEMIA: ICD-10-CM

## 2021-03-11 DIAGNOSIS — E11.8 TYPE 2 DIABETES MELLITUS WITH COMPLICATION, WITHOUT LONG-TERM CURRENT USE OF INSULIN (HCC): ICD-10-CM

## 2021-03-11 DIAGNOSIS — I10 ESSENTIAL HYPERTENSION: ICD-10-CM

## 2021-03-11 PROCEDURE — 82043 UR ALBUMIN QUANTITATIVE: CPT

## 2021-03-11 PROCEDURE — 80061 LIPID PANEL: CPT

## 2021-03-11 PROCEDURE — 36415 COLL VENOUS BLD VENIPUNCTURE: CPT

## 2021-03-11 PROCEDURE — 83036 HEMOGLOBIN GLYCOSYLATED A1C: CPT | Mod: GA

## 2021-03-11 PROCEDURE — 85025 COMPLETE CBC W/AUTO DIFF WBC: CPT

## 2021-03-11 PROCEDURE — 80053 COMPREHEN METABOLIC PANEL: CPT

## 2021-03-11 PROCEDURE — 82570 ASSAY OF URINE CREATININE: CPT

## 2021-03-12 LAB
ALBUMIN SERPL BCP-MCNC: 4.2 G/DL (ref 3.2–4.9)
ALBUMIN/GLOB SERPL: 1.6 G/DL
ALP SERPL-CCNC: 78 U/L (ref 30–99)
ALT SERPL-CCNC: 28 U/L (ref 2–50)
ANION GAP SERPL CALC-SCNC: 11 MMOL/L (ref 7–16)
AST SERPL-CCNC: 20 U/L (ref 12–45)
BASOPHILS # BLD AUTO: 1.2 % (ref 0–1.8)
BASOPHILS # BLD: 0.1 K/UL (ref 0–0.12)
BILIRUB SERPL-MCNC: 0.6 MG/DL (ref 0.1–1.5)
BUN SERPL-MCNC: 19 MG/DL (ref 8–22)
CALCIUM SERPL-MCNC: 9.8 MG/DL (ref 8.5–10.5)
CHLORIDE SERPL-SCNC: 98 MMOL/L (ref 96–112)
CHOLEST SERPL-MCNC: 214 MG/DL (ref 100–199)
CO2 SERPL-SCNC: 30 MMOL/L (ref 20–33)
CREAT SERPL-MCNC: 1.05 MG/DL (ref 0.5–1.4)
CREAT UR-MCNC: 95.17 MG/DL
EOSINOPHIL # BLD AUTO: 0.37 K/UL (ref 0–0.51)
EOSINOPHIL NFR BLD: 4.6 % (ref 0–6.9)
ERYTHROCYTE [DISTWIDTH] IN BLOOD BY AUTOMATED COUNT: 45.5 FL (ref 35.9–50)
EST. AVERAGE GLUCOSE BLD GHB EST-MCNC: 148 MG/DL
FASTING STATUS PATIENT QL REPORTED: NORMAL
GLOBULIN SER CALC-MCNC: 2.6 G/DL (ref 1.9–3.5)
GLUCOSE SERPL-MCNC: 141 MG/DL (ref 65–99)
HBA1C MFR BLD: 6.8 % (ref 4–5.6)
HCT VFR BLD AUTO: 47.9 % (ref 37–47)
HDLC SERPL-MCNC: 42 MG/DL
HGB BLD-MCNC: 15.7 G/DL (ref 12–16)
IMM GRANULOCYTES # BLD AUTO: 0.02 K/UL (ref 0–0.11)
IMM GRANULOCYTES NFR BLD AUTO: 0.2 % (ref 0–0.9)
LDLC SERPL CALC-MCNC: 136 MG/DL
LYMPHOCYTES # BLD AUTO: 2.88 K/UL (ref 1–4.8)
LYMPHOCYTES NFR BLD: 35.5 % (ref 22–41)
MCH RBC QN AUTO: 29.7 PG (ref 27–33)
MCHC RBC AUTO-ENTMCNC: 32.8 G/DL (ref 33.6–35)
MCV RBC AUTO: 90.7 FL (ref 81.4–97.8)
MICROALBUMIN UR-MCNC: <1.2 MG/DL
MICROALBUMIN/CREAT UR: NORMAL MG/G (ref 0–30)
MONOCYTES # BLD AUTO: 0.76 K/UL (ref 0–0.85)
MONOCYTES NFR BLD AUTO: 9.4 % (ref 0–13.4)
NEUTROPHILS # BLD AUTO: 3.99 K/UL (ref 2–7.15)
NEUTROPHILS NFR BLD: 49.1 % (ref 44–72)
NRBC # BLD AUTO: 0 K/UL
NRBC BLD-RTO: 0 /100 WBC
PLATELET # BLD AUTO: 388 K/UL (ref 164–446)
PMV BLD AUTO: 9.4 FL (ref 9–12.9)
POTASSIUM SERPL-SCNC: 3.6 MMOL/L (ref 3.6–5.5)
PROT SERPL-MCNC: 6.8 G/DL (ref 6–8.2)
RBC # BLD AUTO: 5.28 M/UL (ref 4.2–5.4)
SODIUM SERPL-SCNC: 139 MMOL/L (ref 135–145)
TRIGL SERPL-MCNC: 179 MG/DL (ref 0–149)
WBC # BLD AUTO: 8.1 K/UL (ref 4.8–10.8)

## 2021-04-08 ENCOUNTER — OFFICE VISIT (OUTPATIENT)
Dept: MEDICAL GROUP | Facility: PHYSICIAN GROUP | Age: 71
End: 2021-04-08
Payer: MEDICARE

## 2021-04-08 VITALS
SYSTOLIC BLOOD PRESSURE: 120 MMHG | HEART RATE: 64 BPM | HEIGHT: 66 IN | TEMPERATURE: 98 F | DIASTOLIC BLOOD PRESSURE: 80 MMHG | BODY MASS INDEX: 22.18 KG/M2 | OXYGEN SATURATION: 96 % | WEIGHT: 138 LBS | RESPIRATION RATE: 14 BRPM

## 2021-04-08 DIAGNOSIS — N18.30 STAGE 3 CHRONIC KIDNEY DISEASE, UNSPECIFIED WHETHER STAGE 3A OR 3B CKD: ICD-10-CM

## 2021-04-08 DIAGNOSIS — E11.8 TYPE 2 DIABETES MELLITUS WITH COMPLICATION, WITHOUT LONG-TERM CURRENT USE OF INSULIN (HCC): ICD-10-CM

## 2021-04-08 PROCEDURE — 99213 OFFICE O/P EST LOW 20 MIN: CPT | Performed by: NURSE PRACTITIONER

## 2021-04-08 ASSESSMENT — PATIENT HEALTH QUESTIONNAIRE - PHQ9
8. MOVING OR SPEAKING SO SLOWLY THAT OTHER PEOPLE COULD HAVE NOTICED. OR THE OPPOSITE, BEING SO FIGETY OR RESTLESS THAT YOU HAVE BEEN MOVING AROUND A LOT MORE THAN USUAL: NOT AT ALL
SUM OF ALL RESPONSES TO PHQ QUESTIONS 1-9: 0
7. TROUBLE CONCENTRATING ON THINGS, SUCH AS READING THE NEWSPAPER OR WATCHING TELEVISION: NOT AT ALL
1. LITTLE INTEREST OR PLEASURE IN DOING THINGS: NOT AT ALL
9. THOUGHTS THAT YOU WOULD BE BETTER OFF DEAD, OR OF HURTING YOURSELF: NOT AT ALL
3. TROUBLE FALLING OR STAYING ASLEEP OR SLEEPING TOO MUCH: NOT AT ALL
5. POOR APPETITE OR OVEREATING: NOT AT ALL
2. FEELING DOWN, DEPRESSED, IRRITABLE, OR HOPELESS: NOT AT ALL
SUM OF ALL RESPONSES TO PHQ9 QUESTIONS 1 AND 2: 0
6. FEELING BAD ABOUT YOURSELF - OR THAT YOU ARE A FAILURE OR HAVE LET YOURSELF OR YOUR FAMILY DOWN: NOT AL ALL
4. FEELING TIRED OR HAVING LITTLE ENERGY: NOT AT ALL

## 2021-04-08 ASSESSMENT — FIBROSIS 4 INDEX: FIB4 SCORE: 0.67

## 2021-04-08 NOTE — ASSESSMENT & PLAN NOTE
This is a chronic condition, improved  A1c is down to 6.8%  Continues on Jardiance and Metformin  Does not take statin due to intolerance, not taken ACE inhibitor  We will continue to monitor this, follow-up in 6 months with labs  Due for monofilament exam

## 2021-04-08 NOTE — PROGRESS NOTES
Chief Complaint   Patient presents with   • Diabetes       HISTORY OF PRESENT ILLNESS: Patient is a 70 y.o. female established patient who presents today to follow up on diabetes    Stage 3 chronic kidney disease  Chronic and stable, discussed the importance of adequate hydration, avoiding nephrotoxic medications and excessive salt in the diet and keeping blood pressure under good control.  We will continue to monitor this.        Type 2 diabetes mellitus with complication, without long-term current use of insulin (HCC)  This is a chronic condition, improved  A1c is down to 6.8%  Continues on Jardiance and Metformin  Does not take statin due to intolerance, not taken ACE inhibitor  We will continue to monitor this, follow-up in 6 months with labs  Due for monofilament exam      Patient Active Problem List    Diagnosis Date Noted   • Stage 3 chronic kidney disease 04/08/2021   • Preglaucoma 02/20/2019   • Age-related cataract of both eyes 08/20/2018   • Health care maintenance 05/09/2017   • Allergy to statin medication 11/08/2016   • Other chest pain 05/04/2016   • Insomnia 11/04/2015   • DNR (do not resuscitate) 11/04/2015   • DNI (do not intubate) 11/04/2015   • Major depressive disorder, recurrent episode, moderate (CMS-HCC) 11/04/2015   • Peripheral neuropathy 04/15/2015   • GERD (gastroesophageal reflux disease) 12/27/2013   • Hypertension 05/23/2012   • Type 2 diabetes mellitus with complication, without long-term current use of insulin ([facility])    • Hyperlipidemia        Allergies:Ace inhibitors, Augmentin, Demerol, Fiorinal, Minipress [fd&c blue #1-fd&c red #40-prazosin], Prednisone, Statins [hmg-coa-r inhibitors], Trilipix [choline fenofibrate], and Vicodin [hydrocodone-acetaminophen]    Current Outpatient Medications   Medication Sig Dispense Refill   • metFORMIN ER (GLUCOPHAGE XR) 500 MG TABLET SR 24 HR Take 2 Tablets by mouth every day. 180 tablet 1   • atenolol-chlorthalidone (TENORETIC) 50-25 MG per  "tablet TAKE ONE TABLET BY MOUTH EVERY DAY 90 tablet 3   • buPROPion SR (WELLBUTRIN-SR) 150 MG TABLET SR 12 HR sustained-release tablet Take 2 Tabs by mouth every day. 180 Tab 3   • pantoprazole (PROTONIX) 40 MG Tablet Delayed Response Take 1 Tab by mouth every day. 90 Tab 3   • Empagliflozin 25 MG Tab Take 25 mg by mouth every day. 90 Tab 3   • Cholecalciferol (VITAMIN D3) 5000 UNIT TABS Take 1,000 mg by mouth.       No current facility-administered medications for this visit.       Social History     Tobacco Use   • Smoking status: Former Smoker     Packs/day: 1.50     Years: 16.00     Pack years: 24.00     Types: Cigarettes     Quit date: 1981     Years since quittin.2   • Smokeless tobacco: Never Used   • Tobacco comment: works in casino   Substance Use Topics   • Alcohol use: No     Alcohol/week: 0.0 oz   • Drug use: No       Family Status   Relation Name Status   • Mo 88 Alive   • Fa   at age 39     Family History   Problem Relation Age of Onset   • Hypertension Mother    • Other Father         RA,  age 40       Review of Systems:   Constitutional: Negative for fever, chills, weight loss and malaise/fatigue.   Respiratory: Negative for cough, sputum production, shortness of breath and wheezing.    Cardiovascular: Negative for chest pain, palpitations, orthopnea and leg swelling.   Gastrointestinal: Negative for heartburn, nausea, vomiting and abdominal pain.   Genitourinary/Renal: Negative for dysuria, urgency and frequency.   Musculoskeletal: Negative for myalgias, back pain and joint pain.   Skin: Negative for rash and itching.   Neurological: Negative for dizziness, tingling, tremors, sensory change, focal weakness and headaches.   Endo/Heme/Allergies: Positive per HPI  Exam:  /80   Pulse 64   Temp 36.7 °C (98 °F) (Temporal)   Resp 14   Ht 1.664 m (5' 5.5\")   Wt 62.6 kg (138 lb)   SpO2 96%   General:  Well nourished, well developed female in NAD  Skin: warm, dry, intact, no " evidence of rash or concerning lesions  Head: is grossly normal.  HEENT: eyes clear, conjunctiva normal, PERRLA  Pulmonary: Clear to ausculation. Normal effort. No rales, ronchi, or wheezing.  Cardiovascular: Regular rate and rhythm without murmur. Carotid and radial pulses are intact and equal bilaterally.  Abdomen: soft, non-tender, positive bowel sounds  Musculoskeletal: no clubbing, cyanosis, or edema.  Psych/mental: no depression, anxiety, hallucinations  Neuro: alert, intact, CN 2-12 grossly intact  Diabetic Foot Exam: No ulcers, erythema or skin lesions present, patient tested with monofilament (10g) and tuning fork found to have decreased sensation bilaterally throughout the ball of the foot, great toe and heel.      Medical decision-making and discussion:    As mentioned above, continue medications as prescribed and call for refills as needed.  Continue with healthy dietary changes  Follow-up 6 months with labs before visit        Assessment/Plan:  Ting was seen today for diabetes.    Diagnoses and all orders for this visit:    Type 2 diabetes mellitus with complication, without long-term current use of insulin (HCC)  -     Diabetic Monofilament LE Exam  -     Basic Metabolic Panel; Future  -     HEMOGLOBIN A1C; Future    Stage 3 chronic kidney disease, unspecified whether stage 3a or 3b CKD  -     Basic Metabolic Panel; Future         Return in about 4 months (around 8/8/2021) for Follow-up, Discuss Labs.    Please note that this dictation was created using voice recognition software. I have made every reasonable attempt to correct obvious errors, but I expect that there are errors of grammar and possibly content that I did not discover before finalizing the note.

## 2021-07-04 DIAGNOSIS — E11.8 TYPE 2 DIABETES MELLITUS WITH COMPLICATION, WITHOUT LONG-TERM CURRENT USE OF INSULIN (HCC): ICD-10-CM

## 2021-07-06 RX ORDER — EMPAGLIFLOZIN 25 MG/1
TABLET, FILM COATED ORAL
Qty: 90 TABLET | Refills: 0 | Status: SHIPPED | OUTPATIENT
Start: 2021-07-06 | End: 2021-07-07 | Stop reason: SDUPTHER

## 2021-08-09 ENCOUNTER — HOSPITAL ENCOUNTER (OUTPATIENT)
Dept: LAB | Facility: MEDICAL CENTER | Age: 71
End: 2021-08-09
Attending: NURSE PRACTITIONER
Payer: MEDICARE

## 2021-08-09 DIAGNOSIS — E11.8 TYPE 2 DIABETES MELLITUS WITH COMPLICATION, WITHOUT LONG-TERM CURRENT USE OF INSULIN (HCC): ICD-10-CM

## 2021-08-09 DIAGNOSIS — N18.30 STAGE 3 CHRONIC KIDNEY DISEASE, UNSPECIFIED WHETHER STAGE 3A OR 3B CKD: ICD-10-CM

## 2021-08-09 LAB
ANION GAP SERPL CALC-SCNC: 12 MMOL/L (ref 7–16)
BUN SERPL-MCNC: 19 MG/DL (ref 8–22)
CALCIUM SERPL-MCNC: 9.9 MG/DL (ref 8.5–10.5)
CHLORIDE SERPL-SCNC: 98 MMOL/L (ref 96–112)
CO2 SERPL-SCNC: 30 MMOL/L (ref 20–33)
CREAT SERPL-MCNC: 1.19 MG/DL (ref 0.5–1.4)
EST. AVERAGE GLUCOSE BLD GHB EST-MCNC: 140 MG/DL
GLUCOSE SERPL-MCNC: 136 MG/DL (ref 65–99)
HBA1C MFR BLD: 6.5 % (ref 4–5.6)
POTASSIUM SERPL-SCNC: 3.8 MMOL/L (ref 3.6–5.5)
SODIUM SERPL-SCNC: 140 MMOL/L (ref 135–145)

## 2021-08-09 PROCEDURE — 36415 COLL VENOUS BLD VENIPUNCTURE: CPT | Mod: GA

## 2021-08-09 PROCEDURE — 83036 HEMOGLOBIN GLYCOSYLATED A1C: CPT | Mod: GA

## 2021-08-09 PROCEDURE — 80048 BASIC METABOLIC PNL TOTAL CA: CPT

## 2021-08-16 ENCOUNTER — HOSPITAL ENCOUNTER (OUTPATIENT)
Dept: LAB | Facility: MEDICAL CENTER | Age: 71
End: 2021-08-16
Attending: NURSE PRACTITIONER
Payer: MEDICARE

## 2021-08-16 ENCOUNTER — OFFICE VISIT (OUTPATIENT)
Dept: MEDICAL GROUP | Facility: PHYSICIAN GROUP | Age: 71
End: 2021-08-16
Payer: MEDICARE

## 2021-08-16 VITALS
WEIGHT: 132 LBS | RESPIRATION RATE: 14 BRPM | TEMPERATURE: 98.1 F | BODY MASS INDEX: 21.21 KG/M2 | SYSTOLIC BLOOD PRESSURE: 132 MMHG | DIASTOLIC BLOOD PRESSURE: 80 MMHG | HEART RATE: 68 BPM | OXYGEN SATURATION: 97 % | HEIGHT: 66 IN

## 2021-08-16 DIAGNOSIS — R63.4 UNINTENTIONAL WEIGHT LOSS: ICD-10-CM

## 2021-08-16 DIAGNOSIS — E78.2 MIXED HYPERLIPIDEMIA: ICD-10-CM

## 2021-08-16 DIAGNOSIS — H25.093 AGE-RELATED INCIPIENT CATARACT OF BOTH EYES: ICD-10-CM

## 2021-08-16 DIAGNOSIS — K21.9 GASTROESOPHAGEAL REFLUX DISEASE, UNSPECIFIED WHETHER ESOPHAGITIS PRESENT: ICD-10-CM

## 2021-08-16 DIAGNOSIS — R55 NEAR SYNCOPE: ICD-10-CM

## 2021-08-16 DIAGNOSIS — Z71.9 ENCOUNTER FOR CONSULTATION: ICD-10-CM

## 2021-08-16 DIAGNOSIS — N18.31 STAGE 3A CHRONIC KIDNEY DISEASE: ICD-10-CM

## 2021-08-16 DIAGNOSIS — I10 ESSENTIAL HYPERTENSION: ICD-10-CM

## 2021-08-16 DIAGNOSIS — E11.8 TYPE 2 DIABETES MELLITUS WITH COMPLICATION, WITHOUT LONG-TERM CURRENT USE OF INSULIN (HCC): ICD-10-CM

## 2021-08-16 LAB
BASOPHILS # BLD AUTO: 1.1 % (ref 0–1.8)
BASOPHILS # BLD: 0.09 K/UL (ref 0–0.12)
EOSINOPHIL # BLD AUTO: 0.26 K/UL (ref 0–0.51)
EOSINOPHIL NFR BLD: 3.3 % (ref 0–6.9)
ERYTHROCYTE [DISTWIDTH] IN BLOOD BY AUTOMATED COUNT: 46.9 FL (ref 35.9–50)
HCT VFR BLD AUTO: 47.9 % (ref 37–47)
HGB BLD-MCNC: 15.7 G/DL (ref 12–16)
IMM GRANULOCYTES # BLD AUTO: 0.02 K/UL (ref 0–0.11)
IMM GRANULOCYTES NFR BLD AUTO: 0.3 % (ref 0–0.9)
LYMPHOCYTES # BLD AUTO: 2.93 K/UL (ref 1–4.8)
LYMPHOCYTES NFR BLD: 36.9 % (ref 22–41)
MCH RBC QN AUTO: 30 PG (ref 27–33)
MCHC RBC AUTO-ENTMCNC: 32.8 G/DL (ref 33.6–35)
MCV RBC AUTO: 91.6 FL (ref 81.4–97.8)
MONOCYTES # BLD AUTO: 0.8 K/UL (ref 0–0.85)
MONOCYTES NFR BLD AUTO: 10.1 % (ref 0–13.4)
NEUTROPHILS # BLD AUTO: 3.85 K/UL (ref 2–7.15)
NEUTROPHILS NFR BLD: 48.3 % (ref 44–72)
NRBC # BLD AUTO: 0 K/UL
NRBC BLD-RTO: 0 /100 WBC
PLATELET # BLD AUTO: 376 K/UL (ref 164–446)
PMV BLD AUTO: 9.3 FL (ref 9–12.9)
RBC # BLD AUTO: 5.23 M/UL (ref 4.2–5.4)
WBC # BLD AUTO: 8 K/UL (ref 4.8–10.8)

## 2021-08-16 PROCEDURE — 99214 OFFICE O/P EST MOD 30 MIN: CPT | Performed by: NURSE PRACTITIONER

## 2021-08-16 PROCEDURE — 36415 COLL VENOUS BLD VENIPUNCTURE: CPT

## 2021-08-16 PROCEDURE — 85025 COMPLETE CBC W/AUTO DIFF WBC: CPT

## 2021-08-16 ASSESSMENT — FIBROSIS 4 INDEX: FIB4 SCORE: 0.68

## 2021-08-16 NOTE — ASSESSMENT & PLAN NOTE
This is a chronic and well controlled condition.  Patient's blood pressure in clinic today is 118/80.  I have reviewed labs with patient.  Patient is currently taking atenolol/chlorthalidone 50/25 mg/tab, no refill needed at this time.    Patient has reported 3 syncopal episodes in the last 8 months however she is unsure if they are related to her blood pressure.    Please see additional notes below and your syncopal episode.  Referral was placed to cardiology.

## 2021-08-16 NOTE — ASSESSMENT & PLAN NOTE
Component      Latest Ref Rng & Units 8/13/2019 3/3/2020 7/7/2020 3/11/2021   GFR If Non African American      >60 mL/min/1.73 m 2 60 56 (A) 57 (A) 52 (A)     Component      Latest Ref Rng & Units 8/9/2021   GFR If Non African American      >60 mL/min/1.73 m 2 45 (A)     Reviewed labs with patient.  This is a chronic condition that continues to decline.  Patient denies any decrease in urine output, change in color in urine, or back pain.

## 2021-08-16 NOTE — ASSESSMENT & PLAN NOTE
This is a chronic and stable condition.  Patient is well controlled on Protonix 40 mg tablet daily.  Patient denies need refills at this time.  We will continue to monitor future appointments

## 2021-08-16 NOTE — PROGRESS NOTES
"Chief Complaint   Patient presents with   • Diabetes   • Weight Loss       Subjective:     HPI:   Ting Gregory is a 71 y.o. female here to discuss the evaluation and management of:        Age-related cataract of both eyes  Patient is followed closely with ophthalmology.  She recently has had an appointment with them where they did some \"cleanup\" in her eyes.  She denies any vision changes at this time.    Near syncope  Patient reports 3 near syncopal episodes since December.  She reports this happened always in the middle the night when she gets up to let her dog out about 3 min after getting out of bed.  She reports a sensation lack of total control and she falls to the ground.    She denies any loss of consciousness and is aware that she is falling but she is unable to control her body to stop the fall.    When she is on the ground she gets a warming sensation from her head to her abdomen and then breaks out in as sweat for about 1-2 min.  After the event she states she feels normal.  She denies any chest pain, palpitations, headaches, vision changes, shortness of breath, or sensation of dizziness or lightheadedness.  Last episode was 3 weeks ago.   Patient does not check her blood sugars regularly.  Patient does not routinely check blood pressures.      Hypertension  This is a chronic and well controlled condition.  Patient's blood pressure in clinic today is 118/80.  I have reviewed labs with patient.  Patient is currently taking atenolol/chlorthalidone 50/25 mg/tab, no refill needed at this time.    Patient has reported 3 syncopal episodes in the last 8 months however she is unsure if they are related to her blood pressure.    Please see additional notes below and your syncopal episode.  Referral was placed to cardiology.    Hyperlipidemia  This is a chronic condition.  Patient is unable to tolerate statins.  Reviewed lab work with patient.  The 10-year ASCVD risk score (Bridger JUANITO Jr., et al., 2013) is: " 21.2%    Values used to calculate the score:      Age: 71 years      Sex: Female      Is Non- : No      Diabetic: Yes      Tobacco smoker: No      Systolic Blood Pressure: 118 mmHg      Is BP treated: Yes      HDL Cholesterol: 42 mg/dL      Total Cholesterol: 214 mg/dL  Lab Results   Component Value Date/Time    CHOLSTRLTOT 214 (H) 2021 07:35 AM    CHOLSTRLTOT 218 (H) 2020 07:39 AM     (H) 2021 07:35 AM     (H) 2020 07:39 AM    HDL 42 2021 07:35 AM    HDL 44 2020 07:39 AM    TRIGLYCERIDE 179 (H) 2021 07:35 AM    TRIGLYCERIDE 220 (H) 2020 07:39 AM     She will continue work on lifestyle modifications.  Referral was placed to cardiology.        GERD (gastroesophageal reflux disease)  This is a chronic and stable condition.  Patient is well controlled on Protonix 40 mg tablet daily.  Patient denies need refills at this time.  We will continue to monitor future appointments      Stage 3 chronic kidney disease  Component      Latest Ref Rng & Units 2019 3/3/2020 2020 3/11/2021   GFR If Non African American      >60 mL/min/1.73 m 2 60 56 (A) 57 (A) 52 (A)     Component      Latest Ref Rng & Units 2021   GFR If Non African American      >60 mL/min/1.73 m 2 45 (A)     Reviewed labs with patient.  This is a chronic condition that continues to decline.  Patient denies any decrease in urine output, change in color in urine, or back pain.        Unintentional weight loss  Vitals 2020   WEIGHT 151 [phone]     Patient reports that she has had unintentional weight loss of 19 pounds in approximately the last year.  Patient does report that she has had a decrease in appetite ever since her  .  Patient denies any bone pain, abdominal pain, change in bowel habits, headaches, or nausea or vomiting.    She is up-to-date on her cancer screening exams.    Type 2 diabetes mellitus with  complication, without long-term current use of insulin (HCC)  Component      Latest Ref Rng & Units 3/11/2021 8/9/2021   Glycohemoglobin      4.0 - 5.6 % 6.8 (H) 6.5 (H)     This is a chronic and improving condition.  Patient's A1c level today is 6.5.  Patient continues on Metformin and Jardiance and denies any side effects these medications.  Patient does not check her blood sugars regularly.  Patient has reported 3 falls/near syncopal episodes since December 2020 that happened in the middle the night.  Please see additional notes in your syncopal episode.  Patient is up-to-date on her monofilament exam and retinal screening.        ROS:  Gen: Positive for weight loss.  Eyes: no changes in vision  Pulm: no sob, no cough  CV: no chest pain, no palpitations  GI: no nausea/vomiting, no diarrhea  : no dysuria  MSk: no myalgias  Skin: no rash  Neuro: Positive per HPI  Heme/Lymph: no easy bruising    Allergies   Allergen Reactions   • Ace Inhibitors Swelling   • Augmentin      Severe rash   • Demerol Rash     Rash     • Fiorinal Rash     Rash     • Minipress [Fd&C Blue #1-Fd&C Red #40-Prazosin] Unspecified     Cannot remember reaction   • Prednisone Unspecified     Migraine, joint pain swelling   • Statins [Hmg-Coa-R Inhibitors]      Severe cramps   • Trilipix [Choline Fenofibrate]      Angioedema, hives   • Vicodin [Hydrocodone-Acetaminophen] Rash     Rash         Current medicines (including changes today)  Current Outpatient Medications   Medication Sig Dispense Refill   • Empagliflozin (JARDIANCE) 25 MG Tab Take 1 tablet by mouth every day. 90 tablet 3   • metFORMIN ER (GLUCOPHAGE XR) 500 MG TABLET SR 24 HR Take 2 Tablets by mouth every day. 180 tablet 1   • atenolol-chlorthalidone (TENORETIC) 50-25 MG per tablet TAKE ONE TABLET BY MOUTH EVERY DAY 90 tablet 3   • buPROPion SR (WELLBUTRIN-SR) 150 MG TABLET SR 12 HR sustained-release tablet Take 2 Tabs by mouth every day. 180 Tab 3   • pantoprazole (PROTONIX) 40 MG  Tablet Delayed Response Take 1 Tab by mouth every day. 90 Tab 3   • Cholecalciferol (VITAMIN D3) 5000 UNIT TABS Take 1,000 mg by mouth.       No current facility-administered medications for this visit.       Social History     Tobacco Use   • Smoking status: Former Smoker     Packs/day: 1.50     Years: 16.00     Pack years: 24.00     Types: Cigarettes     Quit date: 1981     Years since quittin.6   • Smokeless tobacco: Never Used   • Tobacco comment: works in [employer]   Vaping Use   • Vaping Use: Never used   Substance Use Topics   • Alcohol use: No     Alcohol/week: 0.0 oz   • Drug use: No       Patient Active Problem List    Diagnosis Date Noted   • Near syncope 2021   • Unintentional weight loss 2021   • Stage 3 chronic kidney disease (HCC) 2021   • Preglaucoma 2019   • Age-related cataract of both eyes 2018   • Health care maintenance 2017   • Allergy to statin medication 2016   • Other chest pain 2016   • Insomnia 2015   • DNR (do not resuscitate) 2015   • DNI (do not intubate) 2015   • Major depressive disorder, recurrent episode, moderate (CMS-HCC) 2015   • Peripheral neuropathy 04/15/2015   • GERD (gastroesophageal reflux disease) 2013   • Hypertension 2012   • Type 2 diabetes mellitus with complication, without long-term current use of insulin ([facility])    • Hyperlipidemia        Family History   Problem Relation Age of Onset   • Hypertension Mother    • Hyperlipidemia Mother    • Other Father         RA,  age 40   • Cancer Father         brain   • Arterial Aneurysm Maternal Grandmother           Objective:     Hospital Outpatient Visit on 2021   Component Date Value Ref Range Status   • Glycohemoglobin 2021 6.5* 4.0 - 5.6 % Final    Comment: Increased risk for diabetes:  5.7 -6.4%  Diabetes:  >6.4%  Glycemic control for adults with diabetes:  <7.0%    The above interpretations are per ADA guidelines.   "Diagnosis  of diabetes mellitus on the basis of elevated Hemoglobin A1c  should be confirmed by repeating the Hb A1c test.     • Est Avg Glucose 08/09/2021 140  mg/dL Final    Comment: The eAG calculation is based on the A1c-Derived Daily Glucose  (ADAG) study.  See the ADA's website for additional information.     • Sodium 08/09/2021 140  135 - 145 mmol/L Final   • Potassium 08/09/2021 3.8  3.6 - 5.5 mmol/L Final   • Chloride 08/09/2021 98  96 - 112 mmol/L Final   • Co2 08/09/2021 30  20 - 33 mmol/L Final   • Glucose 08/09/2021 136* 65 - 99 mg/dL Final   • Bun 08/09/2021 19  8 - 22 mg/dL Final   • Creatinine 08/09/2021 1.19  0.50 - 1.40 mg/dL Final   • Calcium 08/09/2021 9.9  8.5 - 10.5 mg/dL Final   • Anion Gap 08/09/2021 12.0  7.0 - 16.0 Final   • GFR If  08/09/2021 54* >60 mL/min/1.73 m 2 Final   • GFR If Non  08/09/2021 45* >60 mL/min/1.73 m 2 Final     Lab Results   Component Value Date/Time    WBC 8.0 08/16/2021 10:22 AM    WBC 6.8 01/24/2012 11:53 AM    RBC 5.23 08/16/2021 10:22 AM    RBC 4.73 01/24/2012 11:53 AM    HEMOGLOBIN 15.7 08/16/2021 10:22 AM    HEMATOCRIT 47.9 (H) 08/16/2021 10:22 AM    MCV 91.6 08/16/2021 10:22 AM    MCV 90 01/24/2012 11:53 AM    MCH 30.0 08/16/2021 10:22 AM    MCH 30.7 01/24/2012 11:53 AM    MCHC 32.8 (L) 08/16/2021 10:22 AM    RDW 46.9 08/16/2021 10:22 AM    RDW 14.0 01/24/2012 11:53 AM    PLATELETCT 376 08/16/2021 10:22 AM    MPV 9.3 08/16/2021 10:22 AM    NEUTSPOLYS 48.30 08/16/2021 10:22 AM    LYMPHOCYTES 36.90 08/16/2021 10:22 AM    MONOCYTES 10.10 08/16/2021 10:22 AM    EOSINOPHILS 3.30 08/16/2021 10:22 AM    BASOPHILS 1.10 08/16/2021 10:22 AM         /80   Pulse 68   Temp 36.7 °C (98.1 °F) (Temporal)   Resp 14   Ht 1.664 m (5' 5.5\")   Wt 59.9 kg (132 lb)   SpO2 97%  Body mass index is 21.63 kg/m².    Physical Exam:  Constitutional: Well-developed and well-nourished female in NAD. Not diaphoretic. No distress.   Skin: warm, dry, " intact, no evidence of rash or concerning lesions  Head: Atraumatic without lesions.  Eyes: Conjunctivae and extraocular motions are normal. Pupils are equal, round, and reactive to light. No scleral icterus.   Ears:  External ears unremarkable. TMs normal; bilaterally  Mouth/Throat: Tongue normal. Oropharynx is clear and moist. Posterior pharynx without erythema or exudates.  Neck: Supple, trachea midline. No thyromegaly present. No cervical or supraclavicular lymphadenopathy.  Cardiovascular: Regular rate and rhythm without murmur. Radial pulses are intact at 1/3 and equal bilaterally.  Pulmonary: Clear to ausculation. Normal effort. No rales, ronchi, or wheezing.  Extremities: No cyanosis, clubbing, erythema, nor edema.   Neurological: Alert and oriented x 3. No cranial nerve deficit. 5/5 myotomes. Sensation intact.   Psychiatric:  Behavior, mood, and affect are appropriate.    EKG Interpretation   Ordered and interpreted by ALICE Anaya  Rhythm: sinus bradycardia  Rate: Bradycardia  Axis: normal   Ectopy: none   ST Segments: no acute change   T Waves: no acute change   Q Waves: none   Clinical Impression: no acute changes and normal EKG         Assessment and Plan:     The following treatment plan was discussed:    1. Near syncope  This is a new condition.  Discussed with patient multiple causes of near syncopal events.  Encouraged her to check her blood sugars in the middle of the night if she wakes up along with her blood pressures.  Discussed with patient ER precautions.  Will refer to cardiology for further work-up and evaluation.  Patient was agreeable to E consult to cardiology for further review of her EKG.  CBC was obtained today.  EKG was performed today.  - REFERRAL TO CARDIOLOGY  - CBC WITH DIFFERENTIAL; Future  - EKG - Clinic Performed    2. Stage 3a chronic kidney disease (HCC)  This is a chronic and stable problem.  Labs reviewed with the patient.  Discussed avoidance of nephrotoxic drugs.   Continue hypertensive management.  Continue to monitor.    - REFERRAL TO NEPHROLOGY  - CBC WITH DIFFERENTIAL; Future    3. Gastroesophageal reflux disease, unspecified whether esophagitis present  This is a chronic and stable problem.  Patient is doing well.  No red flags.  Continue PPI and monitor.    4. Mixed hyperlipidemia  Well controlled.  Labs as indicated.  Unable to tolerate statins.  Continue with lifestyle modifications.  Continue to monitor.    5. Essential hypertension  Well-controlled on current regimen.  Labs as indicated.  Continue antihypertensive medications.  Discussed decreasing salt intake.  Emphasized benefits of exercise and diet. Continue to monitor.    6. Unintentional weight loss  This is a new condition.  We will continue to monitor future appointments.  CBC was ran today.    7. Type 2 diabetes mellitus with complication, without long-term current use of insulin (HCC)  Chronic and stable condition.  Continue on current medication and treatment plan at this time.    8. Encounter for consultation  - E-Consult to Cardiology    9. Age-related incipient cataract of both eyes  Continue to follow with ophthalmology.    Any change or worsening of signs or symptoms, patient encouraged to follow-up or report to emergency room for further evaluation. Patient verbalizes understanding and agrees.    Follow-Up: Return in about 2 months (around 10/16/2021) for Near syncople episode.      PLEASE NOTE: This dictation was created using voice recognition software. I have made every reasonable attempt to correct obvious errors, but I expect that there are errors of grammar and possibly content that I did not discover before finalizing the note.

## 2021-08-16 NOTE — ASSESSMENT & PLAN NOTE
"Patient is followed closely with ophthalmology.  She recently has had an appointment with them where they did some \"cleanup\" in her eyes.  She denies any vision changes at this time.  "

## 2021-08-16 NOTE — ASSESSMENT & PLAN NOTE
This is a chronic condition.  Patient is unable to tolerate statins.  Reviewed lab work with patient.  The 10-year ASCVD risk score (Bridger SOLOMON Jr., et al., 2013) is: 21.2%    Values used to calculate the score:      Age: 71 years      Sex: Female      Is Non- : No      Diabetic: Yes      Tobacco smoker: No      Systolic Blood Pressure: 118 mmHg      Is BP treated: Yes      HDL Cholesterol: 42 mg/dL      Total Cholesterol: 214 mg/dL  Lab Results   Component Value Date/Time    CHOLSTRLTOT 214 (H) 03/11/2021 07:35 AM    CHOLSTRLTOT 218 (H) 03/03/2020 07:39 AM     (H) 03/11/2021 07:35 AM     (H) 03/03/2020 07:39 AM    HDL 42 03/11/2021 07:35 AM    HDL 44 03/03/2020 07:39 AM    TRIGLYCERIDE 179 (H) 03/11/2021 07:35 AM    TRIGLYCERIDE 220 (H) 03/03/2020 07:39 AM     She will continue work on lifestyle modifications.  Referral was placed to cardiology.

## 2021-08-16 NOTE — ASSESSMENT & PLAN NOTE
Component      Latest Ref Rng & Units 3/11/2021 8/9/2021   Glycohemoglobin      4.0 - 5.6 % 6.8 (H) 6.5 (H)     This is a chronic and improving condition.  Patient's A1c level today is 6.5.  Patient continues on Metformin and Jardiance and denies any side effects these medications.  Patient does not check her blood sugars regularly.  Patient has reported 3 falls/near syncopal episodes since December 2020 that happened in the middle the night.  Please see additional notes in your syncopal episode.  Patient is up-to-date on her monofilament exam and retinal screening.

## 2021-08-16 NOTE — ASSESSMENT & PLAN NOTE
Vitals 2020   WEIGHT [phone] 132     Patient reports that she has had unintentional weight loss of 19 pounds in approximately the last year.  Patient does report that she has had a decrease in appetite ever since her  .  Patient denies any bone pain, abdominal pain, change in bowel habits, headaches, or nausea or vomiting.    She is up-to-date on her cancer screening exams.

## 2021-08-16 NOTE — ASSESSMENT & PLAN NOTE
Patient reports 3 near syncopal episodes since December.  She reports this happened always in the middle the night when she gets up to let her dog out about 3 min after getting out of bed.  She reports a sensation lack of total control and she falls to the ground.    She denies any loss of consciousness and is aware that she is falling but she is unable to control her body to stop the fall.    When she is on the ground she gets a warming sensation from her head to her abdomen and then breaks out in as sweat for about 1-2 min.  After the event she states she feels normal.  She denies any chest pain, palpitations, headaches, vision changes, shortness of breath, or sensation of dizziness or lightheadedness.  Last episode was 3 weeks ago.   Patient does not check her blood sugars regularly.  Patient does not routinely check blood pressures.

## 2021-08-17 ENCOUNTER — PATIENT MESSAGE (OUTPATIENT)
Dept: MEDICAL GROUP | Facility: PHYSICIAN GROUP | Age: 71
End: 2021-08-17

## 2021-08-17 DIAGNOSIS — I10 ESSENTIAL HYPERTENSION: ICD-10-CM

## 2021-08-17 DIAGNOSIS — R55 NEAR SYNCOPE: ICD-10-CM

## 2021-08-17 RX ORDER — ATENOLOL 50 MG/1
50 TABLET ORAL DAILY
Qty: 90 TABLET | Refills: 1 | Status: SHIPPED | OUTPATIENT
Start: 2021-08-17 | End: 2022-02-03

## 2021-08-18 ENCOUNTER — PATIENT MESSAGE (OUTPATIENT)
Dept: MEDICAL GROUP | Facility: PHYSICIAN GROUP | Age: 71
End: 2021-08-18

## 2021-08-28 ENCOUNTER — PATIENT MESSAGE (OUTPATIENT)
Dept: MEDICAL GROUP | Facility: PHYSICIAN GROUP | Age: 71
End: 2021-08-28

## 2021-08-30 ENCOUNTER — HOSPITAL ENCOUNTER (OUTPATIENT)
Dept: LAB | Facility: MEDICAL CENTER | Age: 71
End: 2021-08-30
Attending: NURSE PRACTITIONER
Payer: MEDICARE

## 2021-08-30 DIAGNOSIS — R55 NEAR SYNCOPE: ICD-10-CM

## 2021-08-30 LAB
ANION GAP SERPL CALC-SCNC: 10 MMOL/L (ref 7–16)
APPEARANCE UR: CLEAR
BILIRUB UR QL STRIP.AUTO: NEGATIVE
BUN SERPL-MCNC: 15 MG/DL (ref 8–22)
CALCIUM SERPL-MCNC: 8.5 MG/DL (ref 8.5–10.5)
CHLORIDE SERPL-SCNC: 104 MMOL/L (ref 96–112)
CO2 SERPL-SCNC: 27 MMOL/L (ref 20–33)
COLOR UR: YELLOW
CREAT SERPL-MCNC: 1 MG/DL (ref 0.5–1.4)
ERYTHROCYTE [DISTWIDTH] IN BLOOD BY AUTOMATED COUNT: 47.5 FL (ref 35.9–50)
GLUCOSE SERPL-MCNC: 121 MG/DL (ref 65–99)
GLUCOSE UR STRIP.AUTO-MCNC: >=1000 MG/DL
HCT VFR BLD AUTO: 47 % (ref 37–47)
HGB BLD-MCNC: 15.4 G/DL (ref 12–16)
KETONES UR STRIP.AUTO-MCNC: NEGATIVE MG/DL
LEUKOCYTE ESTERASE UR QL STRIP.AUTO: NEGATIVE
MCH RBC QN AUTO: 30.1 PG (ref 27–33)
MCHC RBC AUTO-ENTMCNC: 32.8 G/DL (ref 33.6–35)
MCV RBC AUTO: 91.8 FL (ref 81.4–97.8)
MICRO URNS: ABNORMAL
NITRITE UR QL STRIP.AUTO: NEGATIVE
PH UR STRIP.AUTO: 6 [PH] (ref 5–8)
PLATELET # BLD AUTO: 349 K/UL (ref 164–446)
PMV BLD AUTO: 9.3 FL (ref 9–12.9)
POTASSIUM SERPL-SCNC: 4.1 MMOL/L (ref 3.6–5.5)
PROT UR QL STRIP: NEGATIVE MG/DL
RBC # BLD AUTO: 5.12 M/UL (ref 4.2–5.4)
RBC UR QL AUTO: NEGATIVE
SODIUM SERPL-SCNC: 141 MMOL/L (ref 135–145)
SP GR UR STRIP.AUTO: 1.02
UROBILINOGEN UR STRIP.AUTO-MCNC: 0.2 MG/DL
WBC # BLD AUTO: 6.9 K/UL (ref 4.8–10.8)

## 2021-08-30 PROCEDURE — 81003 URINALYSIS AUTO W/O SCOPE: CPT

## 2021-08-30 PROCEDURE — 82043 UR ALBUMIN QUANTITATIVE: CPT

## 2021-08-30 PROCEDURE — 36415 COLL VENOUS BLD VENIPUNCTURE: CPT

## 2021-08-30 PROCEDURE — 80048 BASIC METABOLIC PNL TOTAL CA: CPT

## 2021-08-30 PROCEDURE — 82570 ASSAY OF URINE CREATININE: CPT

## 2021-08-30 PROCEDURE — 85027 COMPLETE CBC AUTOMATED: CPT

## 2021-08-31 ENCOUNTER — PATIENT MESSAGE (OUTPATIENT)
Dept: MEDICAL GROUP | Facility: PHYSICIAN GROUP | Age: 71
End: 2021-08-31

## 2021-08-31 LAB
CREAT UR-MCNC: 51.54 MG/DL
MICROALBUMIN UR-MCNC: <1.2 MG/DL
MICROALBUMIN/CREAT UR: NORMAL MG/G (ref 0–30)

## 2021-09-01 ENCOUNTER — PATIENT MESSAGE (OUTPATIENT)
Dept: MEDICAL GROUP | Facility: PHYSICIAN GROUP | Age: 71
End: 2021-09-01

## 2021-09-03 ENCOUNTER — TELEMEDICINE2 (OUTPATIENT)
Dept: NEPHROLOGY | Facility: MEDICAL CENTER | Age: 71
End: 2021-09-03
Payer: MEDICARE

## 2021-09-03 ENCOUNTER — TELEMEDICINE ORIGINATING SITE VISIT (OUTPATIENT)
Dept: MEDICAL GROUP | Facility: PHYSICIAN GROUP | Age: 71
End: 2021-09-03

## 2021-09-03 VITALS
OXYGEN SATURATION: 97 % | WEIGHT: 134 LBS | HEIGHT: 65 IN | DIASTOLIC BLOOD PRESSURE: 80 MMHG | RESPIRATION RATE: 12 BRPM | TEMPERATURE: 97.6 F | BODY MASS INDEX: 22.33 KG/M2 | SYSTOLIC BLOOD PRESSURE: 132 MMHG | HEART RATE: 57 BPM

## 2021-09-03 DIAGNOSIS — N18.31 STAGE 3A CHRONIC KIDNEY DISEASE: ICD-10-CM

## 2021-09-03 DIAGNOSIS — E11.8 TYPE 2 DIABETES MELLITUS WITH COMPLICATION, WITHOUT LONG-TERM CURRENT USE OF INSULIN (HCC): ICD-10-CM

## 2021-09-03 DIAGNOSIS — I10 ESSENTIAL HYPERTENSION: ICD-10-CM

## 2021-09-03 DIAGNOSIS — Z86.39 HISTORY OF VITAMIN D DEFICIENCY: ICD-10-CM

## 2021-09-03 PROCEDURE — 99214 OFFICE O/P EST MOD 30 MIN: CPT | Mod: 95 | Performed by: INTERNAL MEDICINE

## 2021-09-03 RX ORDER — LOSARTAN POTASSIUM 50 MG/1
50 TABLET ORAL DAILY
Qty: 30 TABLET | Refills: 11 | Status: SHIPPED | OUTPATIENT
Start: 2021-09-03 | End: 2022-08-07 | Stop reason: SDUPTHER

## 2021-09-03 ASSESSMENT — ENCOUNTER SYMPTOMS
ABDOMINAL PAIN: 1
SHORTNESS OF BREATH: 0
FEVER: 0

## 2021-09-03 ASSESSMENT — FIBROSIS 4 INDEX: FIB4 SCORE: 0.76

## 2021-09-03 NOTE — PROGRESS NOTES
"Telemedicine Note     Please note that I could not evaluate the patient in person at the site. All examination and evaluation of the patient and information gathering from the patient and/or the family were done jointly by the requesting physician, OLINDA Stevens at the site and me via licensed and securely encrypted tele-video communication equipment with the assistance of a trained tele-presenter at the originating site.  As such, my assessments and recommendations are limited as far as such telecommnication allows.     Consulting MD: Pernell Ortiz M.D.    Consulting MD location: [place], [place]  Requesting Physician: OLINDA Stevens   Patient name:      Ting Gregory  :                    1950   MRN:                   [id number]   Data source:  Patient   Video Time:         30 minutes.   Originating site: [place], [place]  Originating site MA: [facility]     Date of Service: 9/3/2021     Chief Complaint:   Chief Complaint   Patient presents with   • New Patient        History of Present Illness:  Ting Gregory is a 71 y.o. female with DM2, HTN, CKD3a who presents today to establish nephrology care.     Re: DM2, diagnosed . Patient has not had microalbuminuria. Patient has seen an eye doctor and does not have retinopathy. Her sugars are well controlled. She is on Jardiance and metformin, started on meds .      Re: HTN, diagnosed . BP control over the years has been well controlled \"most of the time.\" She has occasional light-headed episodes. She used to be on atenolol-chlorthalidone, now on atenolol alone. Now BP has been 120-130 / 80's.     Re: CKD, denies history of kidney failure, denies history of GUILLE. She denies bladder. She used to take Aleve maybe 3 times a month, but stopped in 2021. She denies chronic daily use of NSAIDs.          ROS:    Review of Systems   Constitutional: Negative for fever.   Respiratory: Negative for shortness of breath.    Cardiovascular: " Negative for chest pain.   Gastrointestinal: Positive for abdominal pain.   Genitourinary: Negative for dysuria.   All other systems reviewed and are negative.                  Past Medical History:  Past Medical History:   Diagnosis Date   • Anxiety    • Asthma    • Cataract    • Depression    • Diabetes mellitus type 2 in nonobese (HCC)    • Diabetic neuropathy (HCC)    • GERD (gastroesophageal reflux disease)    • History of mammogram     fibrocystic breast disease   • Hyperlipidemia    • Hypertension    • Irritable bowel syndrome    • Kidney disease    • Pelvic exam     ASCUS, then had hysterectomy   • S/P colonoscopy     2002   • Vitamin D deficiency      Past Surgical History:   Procedure Laterality Date   • ABDOMINAL HYSTERECTOMY TOTAL     • APPENDECTOMY     • SOLO BY LAPAROSCOPY     • CYSTECTOMY     • CYSTOSTOMY      in abdominal   • DILATION AND CURETTAGE      x4   • HYSTERECTOMY, TOTAL ABDOMINAL      took everything        Current Outpatient Medications   Medication Sig Dispense Refill   • metFORMIN ER (GLUCOPHAGE XR) 500 MG TABLET SR 24 HR TAKE TWO TABLETS BY MOUTH EVERY  Tablet 1   • pantoprazole (PROTONIX) 40 MG Tablet Delayed Response TAKE 1 TABLET BY MOUTH EVERY DAY 90 Tablet 3   • atenolol (TENORMIN) 50 MG Tab Take 1 Tablet by mouth every day. 90 Tablet 1   • Empagliflozin (JARDIANCE) 25 MG Tab Take 1 tablet by mouth every day. 90 tablet 3   • buPROPion SR (WELLBUTRIN-SR) 150 MG TABLET SR 12 HR sustained-release tablet Take 2 Tabs by mouth every day. 180 Tab 3   • Cholecalciferol (VITAMIN D3) 5000 UNIT TABS Take 1,000 mg by mouth.       No current facility-administered medications for this visit.        Social History:  Social History     Socioeconomic History   • Marital status:      Spouse name: Not on file   • Number of children: Not on file   • Years of education: Not on file   • Highest education level: Not on file   Occupational History   • Not on file   Tobacco Use   • Smoking  status: Former Smoker     Packs/day: 1.50     Years: 16.00     Pack years: 24.00     Types: Cigarettes     Quit date: 1981     Years since quittin.6   • Smokeless tobacco: Never Used   • Tobacco comment: works in [employer]   Vaping Use   • Vaping Use: Never used   Substance and Sexual Activity   • Alcohol use: No     Alcohol/week: 0.0 oz   • Drug use: No   • Sexual activity: Not Currently     Partners: Male     Comment:    Other Topics Concern   • Not on file   Social History Narrative   • Not on file     Social Determinants of Health     Financial Resource Strain:    • Difficulty of Paying Living Expenses:    Food Insecurity:    • Worried About Running Out of Food in the Last Year:    • Ran Out of Food in the Last Year:    Transportation Needs:    • Lack of Transportation (Medical):    • Lack of Transportation (Non-Medical):    Physical Activity:    • Days of Exercise per Week:    • Minutes of Exercise per Session:    Stress:    • Feeling of Stress :    Social Connections:    • Frequency of Communication with Friends and Family:    • Frequency of Social Gatherings with Friends and Family:    • Attends Baptism Services:    • Active Member of Clubs or Organizations:    • Attends Club or Organization Meetings:    • Marital Status:    Intimate Partner Violence:    • Fear of Current or Ex-Partner:    • Emotionally Abused:    • Physically Abused:    • Sexually Abused:         Family History:  Family History   Problem Relation Age of Onset   • Hypertension Mother    • Hyperlipidemia Mother    • Other Father         RA,  age 40   • Cancer Father         brain   • Arterial Aneurysm Maternal Grandmother         Allergies:   Allergies   Allergen Reactions   • Ace Inhibitors Swelling   • Augmentin      Severe rash   • Demerol Rash     Rash     • Fiorinal Rash     Rash     • Minipress [Fd&C Blue #1-Fd&C Red #40-Prazosin] Unspecified     Cannot remember reaction   • Prednisone Unspecified     Migraine, joint  "pain swelling   • Statins [Hmg-Coa-R Inhibitors]      Severe cramps   • Trilipix [Choline Fenofibrate]      Angioedema, hives   • Vicodin [Hydrocodone-Acetaminophen] Rash     Rash          Current Outpatient Medications:   (Not in a hospital admission)           Physical Exam:   Vitals:   Vitals:    09/03/21 1003   BP: 132/80   BP Location: Left arm   Patient Position: Sitting   Pulse: (!) 57   Resp: 12   Temp: 36.4 °C (97.6 °F)   SpO2: 97%   Weight: 60.8 kg (134 lb)   Height: 1.651 m (5' 5\")       GEN: comfortable, in NAD   HEENT: Pupils unable to check.  Hearings appears normal to finger rubs b/l. Unable to check TMs. Oropharynx or nares could not be checked reliably.   NECK: appears supple, unable to check for thyroid or lymphadenopathy.  CV: unable to auscultate.   PULM: unable to auscultate.  ABD: appears soft, nontender, not distended.  Ext: unable to check but RN reports 2+ pedal pulses.  SKIN: No obvious rash or lesion noted.   Psychiatric: normal mood and affect, alert and oriented to self, place, persons and time.           Imaging reviewed & Visualized by me:  DIAGNOSTIC IMAGING REVIEWED AND/OR INTERPRETED BY ME:         Laboratory:   Recent Labs     03/11/21  0735 08/09/21  0821 08/30/21  0702   ALBUMIN 4.2  --   --    HDL 42  --   --    TRIGLYCERIDE 179*  --   --    SODIUM 139 140 141   POTASSIUM 3.6 3.8 4.1   CHLORIDE 98 98 104   CO2 30 30 27   BUN 19 19 15   CREATININE 1.05 1.19 1.00       [unfilled]       Assessment and Plan:   Ting Gregory is a 71 y.o. female with DM2, HTN, CKD3a who presents today to establish nephrology care.     1. Stage 3a chronic kidney disease (HCC)  - Underlying CKD likely from diabetes, hypertension, and age-related kidney decline.  I recommend avoid NSAIDs and other nephrotoxins.  I recommend a low-salt diet.  I explained the importance of blood pressure and glycemic control to help slow CKD progression.  Recommend start losartan for long-term kidney protection as " patient has allergy to ACE inhibitor.    2. Type 2 diabetes mellitus with complication, without long-term current use of insulin (HCC)  -Controlled per patient.  Continue SGLT2 inhibitor.  Recommend ARB for maximum kidney protection.  Defer further management to primary care.    3. Essential hypertension  - start losartan 50mg daily.            Return to clinic in 9 months with preclinic labs      I spent total of 30 minutes on face-to-face via telemonitor more than half of which was spent coordinating this with patient as well as reviewing the images and labs results with the patient, and answering all questions and explaining in details the assessment and recommendation sections above. The patient expressed their understanding and agreement to the above.        This EM service was conducted utilizing secure and encrypted videoconferencing equipment with the assistance of a trained tele-presenter at the originating site.       Pernell Ortiz MD  Nephrology

## 2021-09-09 ENCOUNTER — PATIENT MESSAGE (OUTPATIENT)
Dept: MEDICAL GROUP | Facility: PHYSICIAN GROUP | Age: 71
End: 2021-09-09

## 2021-09-24 ENCOUNTER — HOSPITAL ENCOUNTER (OUTPATIENT)
Dept: RADIOLOGY | Facility: MEDICAL CENTER | Age: 71
End: 2021-09-24
Attending: INTERNAL MEDICINE
Payer: MEDICARE

## 2021-09-24 DIAGNOSIS — N18.31 STAGE 3A CHRONIC KIDNEY DISEASE: ICD-10-CM

## 2021-09-24 PROCEDURE — 76775 US EXAM ABDO BACK WALL LIM: CPT

## 2021-09-27 ENCOUNTER — TELEPHONE (OUTPATIENT)
Dept: CARDIOLOGY | Facility: MEDICAL CENTER | Age: 71
End: 2021-09-27

## 2021-09-27 DIAGNOSIS — I10 ESSENTIAL HYPERTENSION: ICD-10-CM

## 2021-09-27 NOTE — TELEPHONE ENCOUNTER
Spoke to pt in regards to obtaining records for NP appointment with [facility]. Per patient has never been treated by a previous cardiologist. Confirmed all recent notes, labs, and cardiac imaging are in Epic. Confirmed with patient appointment date and time.

## 2021-09-27 NOTE — TELEPHONE ENCOUNTER
Ron Briggs,   This new patient needs an EKG ordered for telemedicine with  on 10/11/21. Thank you!

## 2021-09-28 NOTE — TELEPHONE ENCOUNTER
Pt called back with more information and states she wanted to make sure [facility] knew about her BP medications. Pt states that her PCP, Dr. Jay and Kidney provider, Dr. Ortiz prescribed them.    Losartan 50mg once a day  Atenolol 50mg once a day  Jardiance 25mg once a day    Please call pt back if you have any further questions at [phone].    Thank you.

## 2021-10-11 ENCOUNTER — TELEMEDICINE2 (OUTPATIENT)
Dept: CARDIOLOGY | Facility: MEDICAL CENTER | Age: 71
End: 2021-10-11
Payer: MEDICARE

## 2021-10-11 VITALS
DIASTOLIC BLOOD PRESSURE: 76 MMHG | WEIGHT: 137 LBS | HEIGHT: 65 IN | OXYGEN SATURATION: 98 % | SYSTOLIC BLOOD PRESSURE: 126 MMHG | HEART RATE: 60 BPM | RESPIRATION RATE: 16 BRPM | BODY MASS INDEX: 22.82 KG/M2

## 2021-10-11 DIAGNOSIS — R55 NEAR SYNCOPE: ICD-10-CM

## 2021-10-11 DIAGNOSIS — Z78.9 STATIN INTOLERANCE: ICD-10-CM

## 2021-10-11 DIAGNOSIS — I10 ESSENTIAL HYPERTENSION: ICD-10-CM

## 2021-10-11 DIAGNOSIS — E78.5 DYSLIPIDEMIA: ICD-10-CM

## 2021-10-11 PROCEDURE — 99202 OFFICE O/P NEW SF 15 MIN: CPT | Mod: 95 | Performed by: STUDENT IN AN ORGANIZED HEALTH CARE EDUCATION/TRAINING PROGRAM

## 2021-10-11 ASSESSMENT — ENCOUNTER SYMPTOMS
DIARRHEA: 0
WHEEZING: 0
DYSPNEA ON EXERTION: 0
FEVER: 0
DIZZINESS: 0
ORTHOPNEA: 0
IRREGULAR HEARTBEAT: 0
PND: 0
NIGHT SWEATS: 0
PALPITATIONS: 0
NAUSEA: 0
SYNCOPE: 0
FOCAL WEAKNESS: 0
ABDOMINAL PAIN: 0
VOMITING: 0
WEAKNESS: 0
SHORTNESS OF BREATH: 0
NEAR-SYNCOPE: 0
COUGH: 0

## 2021-10-11 ASSESSMENT — FIBROSIS 4 INDEX: FIB4 SCORE: 0.76

## 2021-10-11 NOTE — PROGRESS NOTES
[facility]    Cardiology Initial Consultation Note/Telemedicine Visit    This evaluation was conducted via [employer] using secure and encrypted videoconferencing technology. The patient was physically located at [facility] in [place], [place]. The patient was presented by a medical professional at the originating site.     The patient's identity was confirmed and verbal consent was obtained for this telemedicine encounter.    Date of note:    10/11/2021    Primary Care Provider: OLINDA Stevens  Referring Provider: Samantha Jay A*     Patient Name: Ting Gregory   YOB: 1950  MRN:              [id number]    Chief Complaint: Near syncope    History of Present Illness: Ms. Ting Gregory is a 71 y.o. female whose current medical problems include dyslipidemia, hypertension, DM who is here for cardiac consultation for near syncope.    The patient was last seen in PCP clinic on 8/16/2021 via telemedicine. During the visit, she reported three episodes of near syncope since December 2020.     The patient presents today for discuss her symptoms.  The patient reports that she has had three episodes of near syncope, and each of these symptoms was around 1am when she had to take her dog out.  The first episode was happened in December 2020.   The most recent one was a few months ago. The patient was walking her dog, and felt like she was going to fall down, and she had to sit down. As soon as she sits down, she feels warm sensation, and she would sweat.  Then within a few minutes, she would feel back to normal. She had been checking her glucose, and reports that her diabetic medications have been adjusted.  Since then, she had not had any recurrent symptoms.  She denies any chest pain or shortness of breath.  No orthopnea/PND/leg swelling.  No palpitations.     Cardiovascular Risk Factors:  1. Smoking status: Former smoker  2. Type II Diabetes Mellitus: On medication  Lab Results    Component Value Date/Time    HBA1C 6.5 (H) 08/09/2021 08:21 AM    HBA1C 6.8 (H) 03/11/2021 07:35 AM     3. Hypertension: On medication  4. Dyslipidemia: Diet controlled  Cholesterol,Tot   Date Value Ref Range Status   03/11/2021 214 (H) 100 - 199 mg/dL Final     LDL   Date Value Ref Range Status   03/11/2021 136 (H) <100 mg/dL Final     HDL   Date Value Ref Range Status   03/11/2021 42 >=40 mg/dL Final     Triglycerides   Date Value Ref Range Status   03/11/2021 179 (H) 0 - 149 mg/dL Final     5. Family history of early Coronary Artery Disease in a first degree relative (Male less than 55 years of age; Female less than 65 years of age): None  6.  Obesity and/or Metabolic Syndrome: BMI 22.8  7. Sedentary lifestyle: Not sedentary    Review of Systems   Constitutional: Negative for fever, malaise/fatigue and night sweats.   Cardiovascular: Negative for chest pain, dyspnea on exertion, irregular heartbeat, leg swelling, near-syncope, orthopnea, palpitations, paroxysmal nocturnal dyspnea and syncope.   Respiratory: Negative for cough, shortness of breath and wheezing.    Gastrointestinal: Negative for abdominal pain, diarrhea, nausea and vomiting.   Neurological: Negative for dizziness, focal weakness and weakness.       All other systems reviewed and are negative.       Current Outpatient Medications   Medication Sig Dispense Refill   • losartan (COZAAR) 50 MG Tab Take 1 Tablet by mouth every day. 30 Tablet 11   • metFORMIN ER (GLUCOPHAGE XR) 500 MG TABLET SR 24 HR TAKE TWO TABLETS BY MOUTH EVERY  Tablet 1   • pantoprazole (PROTONIX) 40 MG Tablet Delayed Response TAKE 1 TABLET BY MOUTH EVERY DAY 90 Tablet 3   • atenolol (TENORMIN) 50 MG Tab Take 1 Tablet by mouth every day. 90 Tablet 1   • Empagliflozin (JARDIANCE) 25 MG Tab Take 1 tablet by mouth every day. 90 tablet 3   • buPROPion SR (WELLBUTRIN-SR) 150 MG TABLET SR 12 HR sustained-release tablet Take 2 Tabs by mouth every day. 180 Tab 3   • Cholecalciferol  "(VITAMIN D3) 5000 UNIT TABS Take 1,000 mg by mouth.       No current facility-administered medications for this visit.         Allergies   Allergen Reactions   • Ace Inhibitors Swelling   • Augmentin      Severe rash   • Demerol Rash     Rash     • Fiorinal Rash     Rash     • Minipress [Fd&C Blue #1-Fd&C Red #40-Prazosin] Unspecified     Cannot remember reaction   • Prednisone Unspecified     Migraine, joint pain swelling   • Statins [Hmg-Coa-R Inhibitors]      Severe cramps   • Trilipix [Choline Fenofibrate]      Angioedema, hives   • Vicodin [Hydrocodone-Acetaminophen] Rash     Rash           Physical Exam:  Ambulatory Vitals  /76 (BP Location: Left arm, Patient Position: Sitting, BP Cuff Size: Adult)   Pulse 60   Resp 16   Ht 1.651 m (5' 5\")   Wt 62.1 kg (137 lb)   SpO2 98%    Oxygen Therapy:  Pulse Oximetry: 98 %  BP Readings from Last 4 Encounters:   10/11/21 126/76   09/03/21 132/80   08/16/21 132/80   04/08/21 120/80       Weight/BMI: Body mass index is 22.8 kg/m².  Wt Readings from Last 4 Encounters:   10/11/21 62.1 kg (137 lb)   09/03/21 60.8 kg (134 lb)   08/16/21 59.9 kg (132 lb)   04/08/21 62.6 kg (138 lb)       General: Well appearing and in no apparent distress  Eyes: nl conjunctiva, no icteric sclera  ENT: wearing a mask, normal external appearance of ears  Neck: no visible JVP,  no carotid bruits  Lungs: normal respiratory effort, CTAB  Heart: RRR, no murmurs, no rubs or gallops,  no edema bilateral lower extremities. No LV/RV heave on cardiac palpatation. + bilateral radial pulses.  + bilateral dp pulses.   Abdomen: soft, non tender, non distended, no masses, normal bowel sounds.  No HSM.  Extremities/MSK: no clubbing, no cyanosis  Neurological: No focal sensory deficits  Psychiatric: Appropriate affect, A/O x 3, intact judgement and insight  Skin: Warm extremities      Lab Data Review:  Lab Results   Component Value Date/Time    CHOLSTRLTOT 214 (H) 03/11/2021 07:35 AM     (H) " 03/11/2021 07:35 AM    HDL 42 03/11/2021 07:35 AM    TRIGLYCERIDE 179 (H) 03/11/2021 07:35 AM       Lab Results   Component Value Date/Time    SODIUM 141 08/30/2021 07:02 AM    POTASSIUM 4.1 08/30/2021 07:02 AM    CHLORIDE 104 08/30/2021 07:02 AM    CO2 27 08/30/2021 07:02 AM    GLUCOSE 121 (H) 08/30/2021 07:02 AM    BUN 15 08/30/2021 07:02 AM    CREATININE 1.00 08/30/2021 07:02 AM    CREATININE 0.75 01/24/2012 11:53 AM    BUNCREATRAT 24 01/24/2012 11:53 AM     Lab Results   Component Value Date/Time    ALKPHOSPHAT 78 03/11/2021 07:35 AM    ASTSGOT 20 03/11/2021 07:35 AM    ALTSGPT 28 03/11/2021 07:35 AM    TBILIRUBIN 0.6 03/11/2021 07:35 AM      Lab Results   Component Value Date/Time    WBC 6.9 08/30/2021 07:02 AM    WBC 6.8 01/24/2012 11:53 AM     Lab Results   Component Value Date/Time    HBA1C 6.5 (H) 08/09/2021 08:21 AM    HBA1C 6.8 (H) 03/11/2021 07:35 AM         Cardiac Imaging and Procedures Review:    EKG dated 10/11/2021: My personal interpretation is sinus rhythm, low voltage in frontal leads    No prior echocardiogram      Assessment & Plan     1. Essential hypertension     2. Dyslipidemia     3. Statin intolerance     4. Near syncope           Shared Medical Decision Making:    Near syncope  Symptoms and clinical history suggestive of orthostatic hypotension vs hypoglycemia. However, given her age and risk factors, I recommended the patient undergo an echocardiogram, an event monitor, and a carotid US.  However, the patient reports that she would like to think before undergoing more testing as she does not plan on getting any procedures done.    -The patient understand that it is my recommendation to undergo the studies stated above, which she has declined, understanding risks and benefits.  The patient reports that she will be amenable to testing if symptoms recur.  -Counseled on lifestyle changes such as remaining well-hydrated and changing positions slowly/getting out of bed slowly.      Hypertension  BP well controlled this visit  -Continue losartan 50mg daily and atenolol 50mg daily    Dyslipidemia  -Patient is allergic to statins.  Age is beyond statin benefit group at this time  -Start aspirin 81mg daily  -Counseled on healthy diet    All of the patient's excellent questions were answered to the best of my knowledge and to her satisfaction.  It was a pleasure seeing Ms. Ting Gregory in my clinic today. Return in about 6 months (around 4/11/2022), or if symptoms worsen or fail to improve. Patient is aware to call the cardiology clinic with any questions or concerns.      Paloma Finley MD  [facility]  [facility], [address]  [address] [address]  Phone: [phone]  Fax: [phone]

## 2021-10-11 NOTE — LETTER
[facility]   [address] [place] [address] [address]  [place] [address]  Phone: [phone]  Fax: [phone]              Ting Gregory  1950    Encounter Date: 10/11/2021    Paloma Finley M.D.          PROGRESS NOTE:      Cardiology Initial Consultation Note/Telemedicine Visit    Date of note:    10/11/2021    Primary Care Provider: OLINDA Stevens  Referring Provider: Samantha Jay A*     Patient Name: Ting Gregory   YOB: 1950  MRN:              [id number]    Chief Complaint: Near syncope    History of Present Illness: Ms. Tign Gregory is a 71 y.o. female whose current medical problems include dyslipidemia, hypertension, DM who is here for cardiac consultation for near syncope.    The patient was last seen in PCP clinic on 8/16/2021 via telemedicine. During the visit, she reported three episodes of near syncope since December 2020.     The patient presents today for discuss her symptoms.  The patietn reports that she has had three episodes of near syncope, and each of these symptoms was around 1am when she had to take her dog out.  The first episode was happened in December 2020.   The most recent one was a few months ago. The patient was walking her dog, and felt like she was going to fall down, and she had to sit down. As soon as she sits down, she feels warm sensation, and she would sweat.  Then within a few minutes, she would feel back to normal. She had been checking her glucose, and since adjusting her diabetic medications.  She had not had any recurrent symptoms.  She denies any chest pain or shortness of breath.  No orthopnea/PND/leg swelling.  No palpitations.     Cardiovascular Risk Factors:  1. Smoking status: Former smoker  2. Type II Diabetes Mellitus: On medication  Lab Results   Component Value Date/Time    HBA1C 6.5 (H) 08/09/2021 08:21 AM    HBA1C 6.8 (H) 03/11/2021 07:35 AM     3. Hypertension: On  medication  4. Dyslipidemia: Diet controlled  Cholesterol,Tot   Date Value Ref Range Status   03/11/2021 214 (H) 100 - 199 mg/dL Final     LDL   Date Value Ref Range Status   03/11/2021 136 (H) <100 mg/dL Final     HDL   Date Value Ref Range Status   03/11/2021 42 >=40 mg/dL Final     Triglycerides   Date Value Ref Range Status   03/11/2021 179 (H) 0 - 149 mg/dL Final     5. Family history of early Coronary Artery Disease in a first degree relative (Male less than 55 years of age; Female less than 65 years of age): None  6.  Obesity and/or Metabolic Syndrome: BMI 22.8  7. Sedentary lifestyle: Not sedentary    Review of Systems   Constitutional: Negative for fever, malaise/fatigue and night sweats.   Cardiovascular: Negative for chest pain, dyspnea on exertion, irregular heartbeat, leg swelling, near-syncope, orthopnea, palpitations, paroxysmal nocturnal dyspnea and syncope.   Respiratory: Negative for cough, shortness of breath and wheezing.    Gastrointestinal: Negative for abdominal pain, diarrhea, nausea and vomiting.   Neurological: Negative for dizziness, focal weakness and weakness.       All other systems reviewed and are negative.       Current Outpatient Medications   Medication Sig Dispense Refill   • losartan (COZAAR) 50 MG Tab Take 1 Tablet by mouth every day. 30 Tablet 11   • metFORMIN ER (GLUCOPHAGE XR) 500 MG TABLET SR 24 HR TAKE TWO TABLETS BY MOUTH EVERY  Tablet 1   • pantoprazole (PROTONIX) 40 MG Tablet Delayed Response TAKE 1 TABLET BY MOUTH EVERY DAY 90 Tablet 3   • atenolol (TENORMIN) 50 MG Tab Take 1 Tablet by mouth every day. 90 Tablet 1   • Empagliflozin (JARDIANCE) 25 MG Tab Take 1 tablet by mouth every day. 90 tablet 3   • buPROPion SR (WELLBUTRIN-SR) 150 MG TABLET SR 12 HR sustained-release tablet Take 2 Tabs by mouth every day. 180 Tab 3   • Cholecalciferol (VITAMIN D3) 5000 UNIT TABS Take 1,000 mg by mouth.       No current facility-administered medications for this visit.  "        Allergies   Allergen Reactions   • Ace Inhibitors Swelling   • Augmentin      Severe rash   • Demerol Rash     Rash     • Fiorinal Rash     Rash     • Minipress [Fd&C Blue #1-Fd&C Red #40-Prazosin] Unspecified     Cannot remember reaction   • Prednisone Unspecified     Migraine, joint pain swelling   • Statins [Hmg-Coa-R Inhibitors]      Severe cramps   • Trilipix [Choline Fenofibrate]      Angioedema, hives   • Vicodin [Hydrocodone-Acetaminophen] Rash     Rash           Physical Exam:  Ambulatory Vitals  /76 (BP Location: Left arm, Patient Position: Sitting, BP Cuff Size: Adult)   Pulse 60   Resp 16   Ht 1.651 m (5' 5\")   Wt 62.1 kg (137 lb)   SpO2 98%    Oxygen Therapy:  Pulse Oximetry: 98 %  BP Readings from Last 4 Encounters:   10/11/21 126/76   09/03/21 132/80   08/16/21 132/80   04/08/21 120/80       Weight/BMI: Body mass index is 22.8 kg/m².  Wt Readings from Last 4 Encounters:   10/11/21 62.1 kg (137 lb)   09/03/21 60.8 kg (134 lb)   08/16/21 59.9 kg (132 lb)   04/08/21 62.6 kg (138 lb)       General: Well appearing and in no apparent distress  Eyes: nl conjunctiva, no icteric sclera  ENT: wearing a mask, normal external appearance of ears  Neck: no visible JVP,  no carotid bruits  Lungs: normal respiratory effort, CTAB  Heart: RRR, no murmurs, no rubs or gallops,  no edema bilateral lower extremities. No LV/RV heave on cardiac palpatation. + bilateral radial pulses.  + bilateral dp pulses.   Abdomen: soft, non tender, non distended, no masses, normal bowel sounds.  No HSM.  Extremities/MSK: no clubbing, no cyanosis  Neurological: No focal sensory deficits  Psychiatric: Appropriate affect, A/O x 3, intact judgement and insight  Skin: Warm extremities      Lab Data Review:  Lab Results   Component Value Date/Time    CHOLSTRLTOT 214 (H) 03/11/2021 07:35 AM     (H) 03/11/2021 07:35 AM    HDL 42 03/11/2021 07:35 AM    TRIGLYCERIDE 179 (H) 03/11/2021 07:35 AM       Lab Results "   Component Value Date/Time    SODIUM 141 08/30/2021 07:02 AM    POTASSIUM 4.1 08/30/2021 07:02 AM    CHLORIDE 104 08/30/2021 07:02 AM    CO2 27 08/30/2021 07:02 AM    GLUCOSE 121 (H) 08/30/2021 07:02 AM    BUN 15 08/30/2021 07:02 AM    CREATININE 1.00 08/30/2021 07:02 AM    CREATININE 0.75 01/24/2012 11:53 AM    BUNCREATRAT 24 01/24/2012 11:53 AM     Lab Results   Component Value Date/Time    ALKPHOSPHAT 78 03/11/2021 07:35 AM    ASTSGOT 20 03/11/2021 07:35 AM    ALTSGPT 28 03/11/2021 07:35 AM    TBILIRUBIN 0.6 03/11/2021 07:35 AM      Lab Results   Component Value Date/Time    WBC 6.9 08/30/2021 07:02 AM    WBC 6.8 01/24/2012 11:53 AM     Lab Results   Component Value Date/Time    HBA1C 6.5 (H) 08/09/2021 08:21 AM    HBA1C 6.8 (H) 03/11/2021 07:35 AM         Cardiac Imaging and Procedures Review:    EKG dated 10/11/2021: My personal interpretation is sinus rhythm, low voltage in frontal leads    No prior echocardiogram      Assessment & Plan     1. Essential hypertension     2. Dyslipidemia     3. Statin intolerance     4. Near syncope           Shared Medical Decision Making:    Near syncope  Symptoms and clinical history suggestive of orthostatic hypotension vs hypoglycemia. However, given her age and risk factors, I recommended the patient undergo an echocardiogram, an event monitor, and a carotid US.  However, the patient reports that she would like to think before undergoing more testing as she does not plan on getting any procedures done.    -Counseled on lifestyle changes such as remaining well-hydrated and changing positions slowly/getting out of bed slowly.     Hypertension  BP well controlled this visit  -Continue losartan 50mg daily and atenolol 50mg daily    Dyslipidemia  -Patient is allergic to statins.  Age is beyond statin benefit group at this time  -Start aspirin 81mg daily  -Counseled on healthy diet    All of the patient's excellent questions were answered to the best of my knowledge and to her  satisfaction.  It was a pleasure seeing Ms. Ting Gregory in my clinic today. Return in about 6 months (around 4/11/2022), or if symptoms worsen or fail to improve. Patient is aware to call the cardiology clinic with any questions or concerns.      Paloma Finley MD  [facility], [address]ld[address].  [address] [address]  Phone: [phone]  Fax: [phone]          Samantha Jay, A.P.R.N.  [address] [place] [address]  Via In Basket

## 2021-10-12 ENCOUNTER — TELEMEDICINE ORIGINATING SITE VISIT (OUTPATIENT)
Dept: MEDICAL GROUP | Facility: PHYSICIAN GROUP | Age: 71
End: 2021-10-12
Payer: MEDICARE

## 2021-10-12 PROCEDURE — 93000 ELECTROCARDIOGRAM COMPLETE: CPT | Performed by: FAMILY MEDICINE

## 2021-11-11 ENCOUNTER — OFFICE VISIT (OUTPATIENT)
Dept: MEDICAL GROUP | Facility: PHYSICIAN GROUP | Age: 71
End: 2021-11-11
Payer: MEDICARE

## 2021-11-11 VITALS
BODY MASS INDEX: 22.82 KG/M2 | HEART RATE: 63 BPM | HEIGHT: 65 IN | SYSTOLIC BLOOD PRESSURE: 120 MMHG | OXYGEN SATURATION: 99 % | WEIGHT: 137 LBS | TEMPERATURE: 97.8 F | DIASTOLIC BLOOD PRESSURE: 82 MMHG | RESPIRATION RATE: 12 BRPM

## 2021-11-11 DIAGNOSIS — F33.1 MAJOR DEPRESSIVE DISORDER, RECURRENT EPISODE, MODERATE (HCC): ICD-10-CM

## 2021-11-11 DIAGNOSIS — E11.8 TYPE 2 DIABETES MELLITUS WITH COMPLICATION, WITHOUT LONG-TERM CURRENT USE OF INSULIN (HCC): ICD-10-CM

## 2021-11-11 DIAGNOSIS — N18.31 STAGE 3A CHRONIC KIDNEY DISEASE: ICD-10-CM

## 2021-11-11 DIAGNOSIS — R55 NEAR SYNCOPE: ICD-10-CM

## 2021-11-11 PROCEDURE — 99214 OFFICE O/P EST MOD 30 MIN: CPT | Performed by: FAMILY MEDICINE

## 2021-11-11 ASSESSMENT — FIBROSIS 4 INDEX: FIB4 SCORE: 0.76

## 2021-11-11 NOTE — PROGRESS NOTES
Subjective:   Ting Perales is a 71 y.o. female here today for evaluation and management of:     Major depressive disorder, recurrent episode, moderate (CMS-HCC)  Chronic condition, pt is on wellbutrin  No SI/HI    Type 2 diabetes mellitus with complication, without long-term current use of insulin ([facility])  A1c:   Lab Results   Component Value Date/Time    HBA1C 6.5 (H) 08/09/2021 0821    AVGLUC 140 08/09/2021 0821     Lipids:   Lab Results   Component Value Date/Time    CHOLSTRLTOT 214 (H) 03/11/2021 07:35 AM    TRIGLYCERIDE 179 (H) 03/11/2021 07:35 AM    HDL 42 03/11/2021 07:35 AM     (H) 03/11/2021 07:35 AM   ]  BMP:   Lab Results   Component Value Date/Time    SODIUM 141 08/30/2021 0702    POTASSIUM 4.1 08/30/2021 0702    CHLORIDE 104 08/30/2021 0702    CO2 27 08/30/2021 0702    GLUCOSE 121 (H) 08/30/2021 0702    BUN 15 08/30/2021 0702    CREATININE 1.00 08/30/2021 0702    CALCIUM 8.5 08/30/2021 0702    ANION 10.0 08/30/2021 0702     GFR:   Lab Results   Component Value Date/Time    IFAFR[place] >60 08/30/2021 0702    IFNOTAFR 55 (A) 08/30/2021 0702     Last eye exam:   Foot exam:   Medications:       Stage 3a chronic kidney disease (HCC)  Was evaluated by nephrologist  chronic condn  Stable, on arb  Encouraged hydration, low salt diet, NSAIDS  Results for TING PERALES (MRN [id number]) as of 11/11/2021 10:03   Ref. Range 7/7/2020 07:07 11/10/2020 07:25 3/11/2021 07:35 8/9/2021 08:21 8/30/2021 07:02   Creatinine Latest Ref Range: 0.50 - 1.40 mg/dL 0.97  1.05 1.19 1.00   GFR If  Latest Ref Range: >60 mL/min/1.73 m 2 >60  >60 54 (A) >60   GFR If Non  Latest Ref Range: >60 mL/min/1.73 m 2 57 (A)  52 (A) 45 (A) 55 (A)       Near syncope  She had an evaluation with cardiology for 3 episodes of near syncope.  With the syncopal episodes she had no hypoglycemia.  Notes that blood pressures have been a little variable but not lower than the teens and not higher than  150.  Cardiology had recommended aspirin she is on 81 mg a day.  She is not currently interested in further evaluation with echocardiogram event monitors or carotid ultrasound.  She explains that since her   she does not want significant investigation or screening tests and she is also DNR/DNI.  I explained to her some of the events like strokes or abnormal heart rhythms that can be treated if found early and she understands that if she has another episode she would like to get the testing done.         Current medicines (including changes today)  Current Outpatient Medications   Medication Sig Dispense Refill   • aspirin EC (ECOTRIN) 81 MG Tablet Delayed Response Take 1 Tablet by mouth every day. 90 Tablet 3   • losartan (COZAAR) 50 MG Tab Take 1 Tablet by mouth every day. 30 Tablet 11   • metFORMIN ER (GLUCOPHAGE XR) 500 MG TABLET SR 24 HR TAKE TWO TABLETS BY MOUTH EVERY  Tablet 1   • pantoprazole (PROTONIX) 40 MG Tablet Delayed Response TAKE 1 TABLET BY MOUTH EVERY DAY 90 Tablet 3   • atenolol (TENORMIN) 50 MG Tab Take 1 Tablet by mouth every day. 90 Tablet 1   • Empagliflozin (JARDIANCE) 25 MG Tab Take 1 tablet by mouth every day. 90 tablet 3   • buPROPion SR (WELLBUTRIN-SR) 150 MG TABLET SR 12 HR sustained-release tablet Take 2 Tabs by mouth every day. 180 Tab 3   • Cholecalciferol (VITAMIN D3) 5000 UNIT TABS Take 1,000 mg by mouth.       No current facility-administered medications for this visit.     She  has a past medical history of Anxiety, Asthma, Cataract, Depression, Diabetes mellitus type 2 in nonobese (HCC), Diabetic neuropathy (HCC), GERD (gastroesophageal reflux disease), History of mammogram, Hyperlipidemia, Hypertension, Irritable bowel syndrome, Kidney disease, Pelvic exam, S/P colonoscopy, and Vitamin D deficiency. She also has no past medical history of Anemia, Arrhythmia, Arthritis, Blood transfusion without reported diagnosis, Breast cancer ([facility]), Cancer (HCC), Clotting  "disorder (HCC), COPD (chronic obstructive pulmonary disease) (HCC), Encounter for long-term (current) use of other medications, Glaucoma, Heart attack (HCC), Heart murmur, HIV (human immunodeficiency virus infection) (HCC), Migraine, Seizure (HCC), Stroke (HCC), Substance abuse (HCC), or Thyroid disease.    ROS  No chest pain, no shortness of breath, no abdominal pain       Objective:     /82   Pulse 63   Temp 36.6 °C (97.8 °F) (Temporal)   Resp 12   Ht 1.651 m (5' 5\")   Wt 62.1 kg (137 lb)   SpO2 99%  Body mass index is 22.8 kg/m².   Physical Exam:  Constitutional: Alert, no distress.  Skin: Warm, dry, good turgor, no rashes in visible areas.  Eye: Equal, round and reactive, conjunctiva clear, lids normal.  ENMT: Lips without lesions, good dentition, oropharynx clear.  Neck: Trachea midline, no masses, no thyromegaly. No cervical or supraclavicular lymphadenopathy  Respiratory: Unlabored respiratory effort, lungs clear to auscultation, no wheezes, no ronchi.  Cardiovascular: Normal S1, S2, no murmur, no edema.  Abdomen: Soft, non-tender, no masses, no hepatosplenomegaly.  Psych: Alert and oriented x3, normal affect and mood.        Assessment and Plan:   The following treatment plan was discussed    1. Major depressive disorder, recurrent episode, moderate (HCC)      2. Type 2 diabetes mellitus with complication, without long-term current use of insulin (HCC)      3. Stage 3a chronic kidney disease (HCC)    Ting was seen today for diabetes.    Diagnoses and all orders for this visit:    Major depressive disorder, recurrent episode, moderate (HCC)    Type 2 diabetes mellitus with complication, without long-term current use of insulin (HCC)    Stage 3a chronic kidney disease (HCC)    Near syncope        Followup: No follow-ups on file.         "

## 2021-11-11 NOTE — ASSESSMENT & PLAN NOTE
Was evaluated by nephrologist  chronic condn  Stable, on arb  Encouraged hydration, low salt diet, NSAIDS  Results for CHRIS PERALES (MRN [id number]) as of 11/11/2021 10:03   Ref. Range 7/7/2020 07:07 11/10/2020 07:25 3/11/2021 07:35 8/9/2021 08:21 8/30/2021 07:02   Creatinine Latest Ref Range: 0.50 - 1.40 mg/dL 0.97  1.05 1.19 1.00   GFR If  Latest Ref Range: >60 mL/min/1.73 m 2 >60  >60 54 (A) >60   GFR If Non  Latest Ref Range: >60 mL/min/1.73 m 2 57 (A)  52 (A) 45 (A) 55 (A)

## 2021-11-11 NOTE — ASSESSMENT & PLAN NOTE
She had an evaluation with cardiology for 3 episodes of near syncope.  With the syncopal episodes she had no hypoglycemia.  Notes that blood pressures have been a little variable but not lower than the teens and not higher than 150.  Cardiology had recommended aspirin she is on 81 mg a day.  She is not currently interested in further evaluation with echocardiogram event monitors or carotid ultrasound.  She explains that since her   she does not want significant investigation or screening tests and she is also DNR/DNI.  I explained to her some of the events like strokes or abnormal heart rhythms that can be treated if found early and she understands that if she has another episode she would like to get the testing done.   No

## 2021-11-11 NOTE — ASSESSMENT & PLAN NOTE
A1c:   Lab Results   Component Value Date/Time    HBA1C 6.5 (H) 08/09/2021 0821    AVGLUC 140 08/09/2021 0821     Lipids:   Lab Results   Component Value Date/Time    CHOLSTRLTOT 214 (H) 03/11/2021 07:35 AM    TRIGLYCERIDE 179 (H) 03/11/2021 07:35 AM    HDL 42 03/11/2021 07:35 AM     (H) 03/11/2021 07:35 AM   ]  BMP:   Lab Results   Component Value Date/Time    SODIUM 141 08/30/2021 0702    POTASSIUM 4.1 08/30/2021 0702    CHLORIDE 104 08/30/2021 0702    CO2 27 08/30/2021 0702    GLUCOSE 121 (H) 08/30/2021 0702    BUN 15 08/30/2021 0702    CREATININE 1.00 08/30/2021 0702    CALCIUM 8.5 08/30/2021 0702    ANION 10.0 08/30/2021 0702     GFR:   Lab Results   Component Value Date/Time    IFAFR[place] >60 08/30/2021 0702    IFNOTAFR 55 (A) 08/30/2021 0702     Last eye exam:   Foot exam:   Medications:

## 2021-11-23 DIAGNOSIS — F33.1 MAJOR DEPRESSIVE DISORDER, RECURRENT EPISODE, MODERATE (HCC): ICD-10-CM

## 2021-11-23 RX ORDER — BUPROPION HYDROCHLORIDE 150 MG/1
TABLET, EXTENDED RELEASE ORAL
Qty: 180 TABLET | Refills: 3 | Status: SHIPPED | OUTPATIENT
Start: 2021-11-23 | End: 2022-11-08

## 2021-11-23 NOTE — TELEPHONE ENCOUNTER
Received request via: Pharmacy    Was the patient seen in the last year in this department? Yes    Does the patient have an active prescription (recently filled or refills available) for medication(s) requested? No     Last office visit: 11/11/2021  Last labs: 08/30/21  
abd pain/dysuria

## 2021-12-26 DIAGNOSIS — E11.8 TYPE 2 DIABETES MELLITUS WITH COMPLICATION, WITHOUT LONG-TERM CURRENT USE OF INSULIN (HCC): ICD-10-CM

## 2021-12-27 RX ORDER — EMPAGLIFLOZIN 25 MG/1
TABLET, FILM COATED ORAL
Qty: 90 TABLET | Refills: 3 | Status: SHIPPED | OUTPATIENT
Start: 2021-12-27 | End: 2022-12-05

## 2022-02-02 DIAGNOSIS — I10 ESSENTIAL HYPERTENSION: ICD-10-CM

## 2022-02-03 RX ORDER — ATENOLOL 50 MG/1
TABLET ORAL
Qty: 90 TABLET | Refills: 3 | Status: SHIPPED
Start: 2022-02-03 | End: 2022-03-30

## 2022-02-08 ENCOUNTER — HOSPITAL ENCOUNTER (OUTPATIENT)
Dept: LAB | Facility: MEDICAL CENTER | Age: 72
End: 2022-02-08
Attending: FAMILY MEDICINE
Payer: MEDICARE

## 2022-02-08 DIAGNOSIS — E11.8 TYPE 2 DIABETES MELLITUS WITH COMPLICATION, WITHOUT LONG-TERM CURRENT USE OF INSULIN (HCC): ICD-10-CM

## 2022-02-08 LAB
EST. AVERAGE GLUCOSE BLD GHB EST-MCNC: 134 MG/DL
HBA1C MFR BLD: 6.3 % (ref 4–5.6)

## 2022-02-08 PROCEDURE — 83036 HEMOGLOBIN GLYCOSYLATED A1C: CPT | Mod: GA

## 2022-02-08 PROCEDURE — 36415 COLL VENOUS BLD VENIPUNCTURE: CPT | Mod: GA

## 2022-02-15 ENCOUNTER — OFFICE VISIT (OUTPATIENT)
Dept: MEDICAL GROUP | Facility: PHYSICIAN GROUP | Age: 72
End: 2022-02-15
Payer: MEDICARE

## 2022-02-15 VITALS
TEMPERATURE: 97.8 F | DIASTOLIC BLOOD PRESSURE: 80 MMHG | WEIGHT: 142 LBS | HEIGHT: 66 IN | HEART RATE: 63 BPM | RESPIRATION RATE: 14 BRPM | SYSTOLIC BLOOD PRESSURE: 120 MMHG | BODY MASS INDEX: 22.82 KG/M2 | OXYGEN SATURATION: 97 %

## 2022-02-15 DIAGNOSIS — R42 DIZZINESS: ICD-10-CM

## 2022-02-15 DIAGNOSIS — R00.1 BRADYCARDIA: ICD-10-CM

## 2022-02-15 DIAGNOSIS — R55 NEAR SYNCOPE: ICD-10-CM

## 2022-02-15 DIAGNOSIS — E11.8 TYPE 2 DIABETES MELLITUS WITH COMPLICATION, WITHOUT LONG-TERM CURRENT USE OF INSULIN (HCC): ICD-10-CM

## 2022-02-15 DIAGNOSIS — F33.1 MAJOR DEPRESSIVE DISORDER, RECURRENT EPISODE, MODERATE (HCC): ICD-10-CM

## 2022-02-15 DIAGNOSIS — N18.31 STAGE 3A CHRONIC KIDNEY DISEASE: ICD-10-CM

## 2022-02-15 PROCEDURE — 99214 OFFICE O/P EST MOD 30 MIN: CPT | Performed by: FAMILY MEDICINE

## 2022-02-15 ASSESSMENT — PATIENT HEALTH QUESTIONNAIRE - PHQ9: CLINICAL INTERPRETATION OF PHQ2 SCORE: 0

## 2022-02-15 ASSESSMENT — FIBROSIS 4 INDEX: FIB4 SCORE: 0.76

## 2022-02-15 NOTE — ASSESSMENT & PLAN NOTE
A1c:   Lab Results   Component Value Date/Time    HBA1C 6.3 (H) 02/08/2022 0728    AVGLUC 134 02/08/2022 0728     Lipids:   Lab Results   Component Value Date/Time    CHOLSTRLTOT 214 (H) 03/11/2021 07:35 AM    TRIGLYCERIDE 179 (H) 03/11/2021 07:35 AM    HDL 42 03/11/2021 07:35 AM     (H) 03/11/2021 07:35 AM   ]  BMP:   Lab Results   Component Value Date/Time    SODIUM 141 08/30/2021 0702    POTASSIUM 4.1 08/30/2021 0702    CHLORIDE 104 08/30/2021 0702    CO2 27 08/30/2021 0702    GLUCOSE 121 (H) 08/30/2021 0702    BUN 15 08/30/2021 0702    CREATININE 1.00 08/30/2021 0702    CALCIUM 8.5 08/30/2021 0702    ANION 10.0 08/30/2021 0702     GFR:   Lab Results   Component Value Date/Time    IFA[place] >60 08/30/2021 0702    IFNOTAFR 55 (A) 08/30/2021 0702     Last eye exam:   Foot exam:   Medications:

## 2022-02-15 NOTE — PROGRESS NOTES
Subjective:   Ting Gregory is a 71 y.o. female here today for evaluation and management of:     Near syncope  Reports a near syncopal episode while talking on the phone w/her granddaughter. Hasn't had an u/s of carotid. Saw cardiology in Oct, 2021 for bradycardia. They recommended echo, holter, and carotid u/s. She wanted to consider it at that time. She mentions she is ok with investigating, but as she is by herself and has a DNR, she doesn't want any treatment if something is found.     Type 2 diabetes mellitus with complication, without long-term current use of insulin ([facility])  A1c:   Lab Results   Component Value Date/Time    HBA1C 6.3 (H) 02/08/2022 0728    AVGLUC 134 02/08/2022 0728     Lipids:   Lab Results   Component Value Date/Time    CHOLSTRLTOT 214 (H) 03/11/2021 07:35 AM    TRIGLYCERIDE 179 (H) 03/11/2021 07:35 AM    HDL 42 03/11/2021 07:35 AM     (H) 03/11/2021 07:35 AM   ]  BMP:   Lab Results   Component Value Date/Time    SODIUM 141 08/30/2021 0702    POTASSIUM 4.1 08/30/2021 0702    CHLORIDE 104 08/30/2021 0702    CO2 27 08/30/2021 0702    GLUCOSE 121 (H) 08/30/2021 0702    BUN 15 08/30/2021 0702    CREATININE 1.00 08/30/2021 0702    CALCIUM 8.5 08/30/2021 0702    ANION 10.0 08/30/2021 0702     GFR:   Lab Results   Component Value Date/Time    IFAFR[place] >60 08/30/2021 0702    IFNOTAFR 55 (A) 08/30/2021 0702     Last eye exam:   Foot exam:   Medications:       Dizziness  Patient is 71 years old she has recurrent episodes of dizziness and near syncope.  She had a recent cardiology evaluation and her cardiologist had recommended an echocardiogram and carotid ultrasound I have ordered these for her.  She has history of significant elevated cholesterols over many years this can cause plaque in the arteries including in the carotid arteries and this may be the cause of her symptoms.  Carotid ultrasound is important to ensure there is no carotid artery stenosis.  Also have ordered the 48-hour  Holter monitor.  Patient notes symptoms are persistent and occur once and maybe 2 weeks.  Blood pressures are normal.       From previous note:   Near syncope  She had an evaluation with cardiology for 3 episodes of near syncope.  With the syncopal episodes she had no hypoglycemia.  Notes that blood pressures have been a little variable but not lower than the teens and not higher than 150.  Cardiology had recommended aspirin she is on 81 mg a day.  She is not currently interested in further evaluation with echocardiogram event monitors or carotid ultrasound.  She explains that since her   she does not want significant investigation or screening tests and she is also DNR/DNI.  I explained to her some of the events like strokes or abnormal heart rhythms that can be treated if found early and she understands that if she has another episode she would like to get the testing done.         Current medicines (including changes today)  Current Outpatient Medications   Medication Sig Dispense Refill   • atenolol (TENORMIN) 50 MG Tab TAKE ONE TABLET BY MOUTH EVERY DAY 90 Tablet 3   • JARDIANCE 25 MG Tab TAKE ONE TABLET BY MOUTH EVERY DAY 90 Tablet 3   • buPROPion SR (WELLBUTRIN-SR) 150 MG TABLET SR 12 HR sustained-release tablet TAKE 2 TABLET BY MOUTH ONCE DAILY 180 Tablet 3   • aspirin EC (ECOTRIN) 81 MG Tablet Delayed Response Take 1 Tablet by mouth every day. 90 Tablet 3   • losartan (COZAAR) 50 MG Tab Take 1 Tablet by mouth every day. 30 Tablet 11   • metFORMIN ER (GLUCOPHAGE XR) 500 MG TABLET SR 24 HR TAKE TWO TABLETS BY MOUTH EVERY  Tablet 1   • pantoprazole (PROTONIX) 40 MG Tablet Delayed Response TAKE 1 TABLET BY MOUTH EVERY DAY 90 Tablet 3   • Cholecalciferol (VITAMIN D3) 5000 UNIT TABS Take 1,000 mg by mouth.       No current facility-administered medications for this visit.     She  has a past medical history of Anxiety, Asthma, Cataract, Depression, Diabetes mellitus type 2 in nonobese (HCC),  "Diabetic neuropathy (HCC), GERD (gastroesophageal reflux disease), History of mammogram, Hyperlipidemia, Hypertension, Irritable bowel syndrome, Kidney disease, Pelvic exam, S/P colonoscopy, and Vitamin D deficiency. She also has no past medical history of Anemia, Arrhythmia, Arthritis, Blood transfusion without reported diagnosis, Breast cancer (HCC), Cancer (HCC), Clotting disorder (HCC), COPD (chronic obstructive pulmonary disease) (HCC), Encounter for long-term (current) use of other medications, Glaucoma, Heart attack (HCC), Heart murmur, HIV (human immunodeficiency virus infection) (HCC), Migraine, Seizure (HCC), Stroke (HCC), Substance abuse (HCC), or Thyroid disease.    ROS  No chest pain, no shortness of breath, no abdominal pain       Objective:     /80   Pulse 63   Temp 36.6 °C (97.8 °F) (Temporal)   Resp 14   Ht 1.664 m (5' 5.5\")   Wt 64.4 kg (142 lb)   SpO2 97%  Body mass index is 23.27 kg/m².   Physical Exam:  Constitutional: Alert, no distress.  Skin: Warm, dry, good turgor, no rashes in visible areas.  Eye: Equal, round and reactive, conjunctiva clear, lids normal.  ENMT: Lips without lesions, good dentition, oropharynx clear.  Neck: Trachea midline, no masses, no thyromegaly. No cervical or supraclavicular lymphadenopathy  Respiratory: Unlabored respiratory effort, lungs clear to auscultation, no wheezes, no ronchi.  Cardiovascular: Normal S1, S2, no murmur, no edema.  Abdomen: Soft, non-tender, no masses, no hepatosplenomegaly.  Psych: Alert and oriented x3, normal affect and mood.        Assessment and Plan:   The following treatment plan was discussed    1. Near syncope    - US-CAROTID DOPPLER BILAT; Standing  - EC-ECHOCARDIOGRAM COMPLETE W/O CONT; Future  - HOLTER - Cardiology Performed (48HR); Future  - US-CAROTID DOPPLER BILAT    2. Dizziness    - US-CAROTID DOPPLER BILAT; Standing  - EC-ECHOCARDIOGRAM COMPLETE W/O CONT; Future  - HOLTER - Cardiology Performed (48HR); Future  - " US-CAROTID DOPPLER BILAT    3. Bradycardia    - EC-ECHOCARDIOGRAM COMPLETE W/O CONT; Future  - HOLTER - Cardiology Performed (48HR); Future    4. Type 2 diabetes mellitus with complication, without long-term current use of insulin (HCC)        Followup: Return in about 6 months (around 8/15/2022) for A1c, f/up on  d/c of metformin.

## 2022-02-15 NOTE — ASSESSMENT & PLAN NOTE
Reports a near syncopal episode while talking on the phone w/her granddaughter. Hasn't had an u/s of carotid. Saw cardiology in Oct, 2021 for bradycardia. They recommended echo, holter, and carotid u/s. She wanted to consider it at that time. She mentions she is ok with investigating, but as she is by herself and has a DNR, she doesn't want any treatment if something is found.

## 2022-02-15 NOTE — ASSESSMENT & PLAN NOTE
Patient is 71 years old she has recurrent episodes of dizziness and near syncope.  She had a recent cardiology evaluation and her cardiologist had recommended an echocardiogram and carotid ultrasound I have ordered these for her.  She has history of significant elevated cholesterols over many years this can cause plaque in the arteries including in the carotid arteries and this may be the cause of her symptoms.  Carotid ultrasound is important to ensure there is no carotid artery stenosis.  Also have ordered the 48-hour Holter monitor.  Patient notes symptoms are persistent and occur once and maybe 2 weeks.  Blood pressures are normal.       From previous note:   Near syncope  She had an evaluation with cardiology for 3 episodes of near syncope.  With the syncopal episodes she had no hypoglycemia.  Notes that blood pressures have been a little variable but not lower than the teens and not higher than 150.  Cardiology had recommended aspirin she is on 81 mg a day.  She is not currently interested in further evaluation with echocardiogram event monitors or carotid ultrasound.  She explains that since her   she does not want significant investigation or screening tests and she is also DNR/DNI.  I explained to her some of the events like strokes or abnormal heart rhythms that can be treated if found early and she understands that if she has another episode she would like to get the testing done.

## 2022-02-17 ENCOUNTER — NON-PROVIDER VISIT (OUTPATIENT)
Dept: CARDIOLOGY | Facility: MEDICAL CENTER | Age: 72
End: 2022-02-17
Attending: FAMILY MEDICINE
Payer: MEDICARE

## 2022-02-17 DIAGNOSIS — R55 NEAR SYNCOPE: ICD-10-CM

## 2022-02-17 DIAGNOSIS — I47.19 ATRIAL TACHYCARDIA (HCC): ICD-10-CM

## 2022-02-17 DIAGNOSIS — R00.1 BRADYCARDIA: ICD-10-CM

## 2022-02-17 DIAGNOSIS — R42 DIZZINESS: ICD-10-CM

## 2022-02-17 DIAGNOSIS — I49.3 PVC (PREMATURE VENTRICULAR CONTRACTION): ICD-10-CM

## 2022-02-17 NOTE — PROGRESS NOTES
48 hour Holter monitor placed, per Garcia Leonard M.D.  >In clinic hook up, monitor serial #[id number].

## 2022-02-20 RX ORDER — METFORMIN HYDROCHLORIDE 500 MG/1
TABLET, EXTENDED RELEASE ORAL
Qty: 180 TABLET | Refills: 1 | Status: SHIPPED | OUTPATIENT
Start: 2022-02-20 | End: 2022-08-11 | Stop reason: SDUPTHER

## 2022-02-20 NOTE — TELEPHONE ENCOUNTER
Received request via: Pharmacy    Was the patient seen in the last year in this department? Yes   LOV 02/15/2022    Does the patient have an active prescription (recently filled or refills available) for medication(s) requested? No

## 2022-02-24 ENCOUNTER — TELEPHONE (OUTPATIENT)
Dept: MEDICAL GROUP | Facility: PHYSICIAN GROUP | Age: 72
End: 2022-02-24
Payer: MEDICARE

## 2022-02-24 NOTE — TELEPHONE ENCOUNTER
----- Message from Garcia Leonard M.D. sent at 2/24/2022  2:58 PM PST -----  Please let patient know her heart monitor was normal.

## 2022-02-25 LAB — EKG IMPRESSION: NORMAL

## 2022-02-25 PROCEDURE — 93224 XTRNL ECG REC UP TO 48 HRS: CPT | Performed by: INTERNAL MEDICINE

## 2022-03-02 DIAGNOSIS — R55 NEAR SYNCOPE: ICD-10-CM

## 2022-03-02 DIAGNOSIS — R00.1 BRADYCARDIA: ICD-10-CM

## 2022-03-02 DIAGNOSIS — R42 DIZZINESS: ICD-10-CM

## 2022-03-02 NOTE — RESULT ENCOUNTER NOTE
Please advise patient I had another look at the cardiologist notes for her holter results and it needs further evaluation.   My sincere apologies, while your holter does not show anything alarming, it does show heart rate down to 43 sometimes and a few brief runs of heart rate up to 146.   I feel this should be evaluated by a cardiologist, so I've placed a referral to cardiology to go over the results more in detail with you.   Please don't be alarmed, this is just to make sure everything is evaluated correctly.   Garcia Leonard M.D.

## 2022-03-29 ASSESSMENT — ENCOUNTER SYMPTOMS
FOCAL WEAKNESS: 0
WHEEZING: 0
PALPITATIONS: 0
DIZZINESS: 0
PND: 0
WEAKNESS: 0
IRREGULAR HEARTBEAT: 0
FEVER: 0
VOMITING: 0
NIGHT SWEATS: 0
NAUSEA: 0
NEAR-SYNCOPE: 0
DYSPNEA ON EXERTION: 0
COUGH: 0
SHORTNESS OF BREATH: 0
SYNCOPE: 0
DIARRHEA: 0
ORTHOPNEA: 0
ABDOMINAL PAIN: 0

## 2022-03-29 NOTE — PROGRESS NOTES
Cardiology Follow-up Consultation Note      Date of note:    03/30/22  Primary Care Provider: OLINDA Stevens    Patient Name: Ting Gregory   YOB: 1950  MRN:              [id number]    Chief Complaint: Near syncope    History of Present Illness: Ms. Ting Gregory is a 71 y.o. female whose current medical problems include dyslipidemia, hypertension, DM who is here for follow-up near syncope.    The patient was last seen by me via telemedicine on 10/11/2021 for 3 episodes of near syncope.  The patient reported that each of these symptoms was around 1am when she had to take her dog out.  The first episode was happened in December 2020.   The most recent one was a few months ago. The patient was walking her dog, and felt like she was going to fall down, and she had to sit down. As soon as she sits down, she feels warm sensation, and she would sweat.  Then within a few minutes, she would feel back to normal. During the last clinic visit with me, the patient was recommended to undergo echocardiogram, event monitor, and carotid ultrasound, which the patient declined due to not wanting to undergo further procedures. However, the patient did end up undergoing a Holter monitor, which showed 4 runs of SVT, occasional PACs, otherwise normal.  An echocardiogram and carotid ultrasound are pending.    The patient returns today for follow-up.  The patient reports feeling well today.  However, the patient reports that she has been more stressed at home due to her 's death and having to arrange things.  Otherwise, she denies any symptoms.  No chest pain or shortness of breath on exertion.  No orthopnea, PND, or leg swelling.  No palpitations.  No recurrent episodes of presyncope.  No syncope.    Cardiovascular Risk Factors:  1. Smoking status: Former smoker  2. Type II Diabetes Mellitus: On medication  Lab Results   Component Value Date/Time    HBA1C 6.3 (H) 02/08/2022 07:28 AM     HBA1C 6.5 (H) 08/09/2021 08:21 AM     3. Hypertension: On medication  4. Dyslipidemia: Diet controlled  Cholesterol,Tot   Date Value Ref Range Status   03/11/2021 214 (H) 100 - 199 mg/dL Final     LDL   Date Value Ref Range Status   03/11/2021 136 (H) <100 mg/dL Final     HDL   Date Value Ref Range Status   03/11/2021 42 >=40 mg/dL Final     Triglycerides   Date Value Ref Range Status   03/11/2021 179 (H) 0 - 149 mg/dL Final     5. Family history of early Coronary Artery Disease in a first degree relative (Male less than 55 years of age; Female less than 65 years of age): None  6.  Obesity and/or Metabolic Syndrome: BMI 22.8  7. Sedentary lifestyle: Not sedentary    Review of Systems   Constitutional: Negative for fever, malaise/fatigue and night sweats.   Cardiovascular: Negative for chest pain, dyspnea on exertion, irregular heartbeat, leg swelling, near-syncope, orthopnea, palpitations, paroxysmal nocturnal dyspnea and syncope.   Respiratory: Negative for cough, shortness of breath and wheezing.    Gastrointestinal: Negative for abdominal pain, diarrhea, nausea and vomiting.   Neurological: Negative for dizziness, focal weakness and weakness.       All other systems reviewed and are negative.       Current Outpatient Medications   Medication Sig Dispense Refill   • metoprolol tartrate (LOPRESSOR) 25 MG Tab Take 1 Tablet by mouth 2 times a day. 180 Tablet 3   • metFORMIN ER (GLUCOPHAGE XR) 500 MG TABLET SR 24 HR TAKE TWO TABLETS BY MOUTH EVERY  Tablet 1   • JARDIANCE 25 MG Tab TAKE ONE TABLET BY MOUTH EVERY DAY 90 Tablet 3   • buPROPion SR (WELLBUTRIN-SR) 150 MG TABLET SR 12 HR sustained-release tablet TAKE 2 TABLET BY MOUTH ONCE DAILY 180 Tablet 3   • aspirin EC (ECOTRIN) 81 MG Tablet Delayed Response Take 1 Tablet by mouth every day. 90 Tablet 3   • losartan (COZAAR) 50 MG Tab Take 1 Tablet by mouth every day. 30 Tablet 11   • pantoprazole (PROTONIX) 40 MG Tablet Delayed Response TAKE 1 TABLET BY MOUTH  "EVERY DAY 90 Tablet 3   • Cholecalciferol (VITAMIN D3) 5000 UNIT TABS Take 1,000 mg by mouth.       No current facility-administered medications for this visit.         Allergies   Allergen Reactions   • Ace Inhibitors Swelling   • Augmentin      Severe rash   • Demerol Rash     Rash     • Fiorinal Rash     Rash     • Minipress [Fd&C Blue #1-Fd&C Red #40-Prazosin] Unspecified     Cannot remember reaction   • Prednisone Unspecified     Migraine, joint pain swelling   • Statins [Hmg-Coa-R Inhibitors]      Severe cramps   • Trilipix [Choline Fenofibrate]      Angioedema, hives   • Vicodin [Hydrocodone-Acetaminophen] Rash     Rash           Physical Exam:  Ambulatory Vitals  /70 (BP Location: Right arm, Patient Position: Sitting, BP Cuff Size: Adult)   Pulse 60   Resp 15   Ht 1.651 m (5' 5\")   Wt 63.7 kg (140 lb 6.4 oz)   SpO2 96%    Oxygen Therapy:  Pulse Oximetry: 96 %  BP Readings from Last 4 Encounters:   03/30/22 130/70   02/15/22 120/80   11/11/21 120/82   10/11/21 126/76       Weight/BMI: Body mass index is 23.36 kg/m².  Wt Readings from Last 4 Encounters:   03/30/22 63.7 kg (140 lb 6.4 oz)   02/15/22 64.4 kg (142 lb)   11/11/21 62.1 kg (137 lb)   10/11/21 62.1 kg (137 lb)       General: Well appearing and in no apparent distress  Eyes: nl conjunctiva, no icteric sclera  ENT: wearing a mask, normal external appearance of ears  Neck: no visible JVP,  no carotid bruits  Lungs: normal respiratory effort, CTAB  Heart: RRR, no murmurs, no rubs or gallops,  no edema bilateral lower extremities. No LV/RV heave on cardiac palpatation. + bilateral radial pulses.  + bilateral dp pulses.   Abdomen: soft, non tender, non distended, no masses, normal bowel sounds.  No HSM.  Extremities/MSK: no clubbing, no cyanosis  Neurological: No focal sensory deficits  Psychiatric: Appropriate affect, A/O x 3, intact judgement and insight  Skin: Warm extremities      Lab Data Review:  Lab Results   Component Value Date/Time    " CHOLSTRLTOT 214 (H) 03/11/2021 07:35 AM     (H) 03/11/2021 07:35 AM    HDL 42 03/11/2021 07:35 AM    TRIGLYCERIDE 179 (H) 03/11/2021 07:35 AM       Lab Results   Component Value Date/Time    SODIUM 141 08/30/2021 07:02 AM    POTASSIUM 4.1 08/30/2021 07:02 AM    CHLORIDE 104 08/30/2021 07:02 AM    CO2 27 08/30/2021 07:02 AM    GLUCOSE 121 (H) 08/30/2021 07:02 AM    BUN 15 08/30/2021 07:02 AM    CREATININE 1.00 08/30/2021 07:02 AM    CREATININE 0.75 01/24/2012 11:53 AM    BUNCREATRAT 24 01/24/2012 11:53 AM     Lab Results   Component Value Date/Time    ALKPHOSPHAT 78 03/11/2021 07:35 AM    ASTSGOT 20 03/11/2021 07:35 AM    ALTSGPT 28 03/11/2021 07:35 AM    TBILIRUBIN 0.6 03/11/2021 07:35 AM      Lab Results   Component Value Date/Time    WBC 6.9 08/30/2021 07:02 AM    WBC 6.8 01/24/2012 11:53 AM     Lab Results   Component Value Date/Time    HBA1C 6.3 (H) 02/08/2022 07:28 AM    HBA1C 6.5 (H) 08/09/2021 08:21 AM         Cardiac Imaging and Procedures Review:    EKG dated 10/11/2021: My personal interpretation is sinus rhythm, low voltage in frontal leads    No prior echocardiogram    Event monitor 2/2022  48 Hour Holter Summary:   Sinus rhythm, ave HR 61 bpm, range  bpm.   No pauses.   Rare PVCs.   PACs present.   Brief atrial runs, up to 12 beats, up to 146 bpm, most c/w atrial   tachycardia.   No sustained rhythm changes.   No symptoms reported.      Assessment & Plan     1. Paroxysmal SVT (supraventricular tachycardia) (HCC)  metoprolol tartrate (LOPRESSOR) 25 MG Tab   2. Premature atrial complexes  metoprolol tartrate (LOPRESSOR) 25 MG Tab   3. Near syncope     4. Essential hypertension  metoprolol tartrate (LOPRESSOR) 25 MG Tab   5. Dyslipidemia     6. Statin intolerance           Shared Medical Decision Making:    Near syncope, resolved  Paroxysmal SVT  PACs  The patient had 4 runs of SVT and occasional PACs, otherwise normal, no pauses.  -We will change from atenolol to metoprolol 25 mg twice  daily  -Counseled on lifestyle changes such as remaining well-hydrated and changing positions slowly/getting out of bed slowly.   -Patient has carotid ultrasound and echocardiogram scheduled    Hypertension  BP well controlled this visit  -Continue losartan 50mg daily   -Change from atenolol to metoprolol as above  -Patient instructed to measure BP at home.  If BP is not well controlled on current metoprolol dose, will uptitrate metoprolol and/or losartan    Dyslipidemia  Statin intolerance  -Patient is allergic to statins.  Age is beyond statin benefit group at this time  -Continue aspirin 81mg daily  -Counseled on healthy diet    All of the patient's excellent questions were answered to the best of my knowledge and to her satisfaction.  It was a pleasure seeing Ms. Ting Gregory in my clinic today. Return in about 3 months (around 6/30/2022). Patient is aware to call the cardiology clinic with any questions or concerns.      Paloma Finley MD  [facility]  [facility], [address]  [address] [address]  Phone: [phone]  Fax: [phone]

## 2022-03-30 ENCOUNTER — OFFICE VISIT (OUTPATIENT)
Dept: CARDIOLOGY | Facility: MEDICAL CENTER | Age: 72
End: 2022-03-30
Attending: FAMILY MEDICINE
Payer: MEDICARE

## 2022-03-30 VITALS
SYSTOLIC BLOOD PRESSURE: 130 MMHG | BODY MASS INDEX: 23.39 KG/M2 | RESPIRATION RATE: 15 BRPM | OXYGEN SATURATION: 96 % | WEIGHT: 140.4 LBS | HEIGHT: 65 IN | HEART RATE: 60 BPM | DIASTOLIC BLOOD PRESSURE: 70 MMHG

## 2022-03-30 DIAGNOSIS — I47.10 PAROXYSMAL SVT (SUPRAVENTRICULAR TACHYCARDIA) (HCC): ICD-10-CM

## 2022-03-30 DIAGNOSIS — I10 ESSENTIAL HYPERTENSION: ICD-10-CM

## 2022-03-30 DIAGNOSIS — R55 NEAR SYNCOPE: ICD-10-CM

## 2022-03-30 DIAGNOSIS — Z78.9 STATIN INTOLERANCE: ICD-10-CM

## 2022-03-30 DIAGNOSIS — I49.1 PREMATURE ATRIAL COMPLEXES: ICD-10-CM

## 2022-03-30 DIAGNOSIS — E78.5 DYSLIPIDEMIA: ICD-10-CM

## 2022-03-30 PROCEDURE — 99214 OFFICE O/P EST MOD 30 MIN: CPT | Performed by: STUDENT IN AN ORGANIZED HEALTH CARE EDUCATION/TRAINING PROGRAM

## 2022-03-30 ASSESSMENT — FIBROSIS 4 INDEX: FIB4 SCORE: 0.76

## 2022-03-30 NOTE — PATIENT INSTRUCTIONS
Stop atenolol  Start metoprolol 25mg twice daily  Measure blood pressure at home. Goal is less than 130/80

## 2022-04-06 ENCOUNTER — PATIENT MESSAGE (OUTPATIENT)
Dept: CARDIOLOGY | Facility: MEDICAL CENTER | Age: 72
End: 2022-04-06
Payer: MEDICARE

## 2022-04-06 ENCOUNTER — TELEPHONE (OUTPATIENT)
Dept: CARDIOLOGY | Facility: MEDICAL CENTER | Age: 72
End: 2022-04-06
Payer: MEDICARE

## 2022-04-06 VITALS — DIASTOLIC BLOOD PRESSURE: 79 MMHG | SYSTOLIC BLOOD PRESSURE: 116 MMHG

## 2022-04-06 NOTE — TELEPHONE ENCOUNTER
Ting Gregory Htet W, M.D. 8 hours ago (8:27 AM)     [place]      I've recorded my bp numbers for you over the last week and they are: 158/90, 134/84 on 4/1; 148/90, 131/85 on 4/2; 142/94, 139/87 on 4/3; 136/90, 136/84, 116/79 on 4/4; 135/82, 154/90, 139/88 on 4/5; and this morning before meds 143/88.  I have kept the Atenolol until you decide my final dosage. Thank you and have a good day doctor will be waiting to hear from you    S/W pt, clarified, she is taking metoprolol as ordered- she just isn't discarding the atenolol until HK sets a firm dosage.     BP readings were all from different times throughout the day. Educated pt measure BP daily 2 hours after medication consistently. Will update us next week.

## 2022-05-31 ENCOUNTER — HOSPITAL ENCOUNTER (OUTPATIENT)
Dept: LAB | Facility: MEDICAL CENTER | Age: 72
End: 2022-05-31
Attending: INTERNAL MEDICINE
Payer: MEDICARE

## 2022-05-31 DIAGNOSIS — E11.8 TYPE 2 DIABETES MELLITUS WITH COMPLICATION, WITHOUT LONG-TERM CURRENT USE OF INSULIN (HCC): ICD-10-CM

## 2022-05-31 DIAGNOSIS — Z86.39 HISTORY OF VITAMIN D DEFICIENCY: ICD-10-CM

## 2022-05-31 DIAGNOSIS — N18.31 STAGE 3A CHRONIC KIDNEY DISEASE: ICD-10-CM

## 2022-05-31 LAB
25(OH)D3 SERPL-MCNC: 47 NG/ML (ref 30–100)
ALBUMIN SERPL BCP-MCNC: 4.2 G/DL (ref 3.2–4.9)
ANION GAP SERPL CALC-SCNC: 13 MMOL/L (ref 7–16)
APPEARANCE UR: CLEAR
BILIRUB UR QL STRIP.AUTO: NEGATIVE
BUN SERPL-MCNC: 23 MG/DL (ref 8–22)
CALCIUM SERPL-MCNC: 9.3 MG/DL (ref 8.5–10.5)
CHLORIDE SERPL-SCNC: 106 MMOL/L (ref 96–112)
CO2 SERPL-SCNC: 23 MMOL/L (ref 20–33)
COLOR UR: YELLOW
CREAT SERPL-MCNC: 0.93 MG/DL (ref 0.5–1.4)
CREAT UR-MCNC: 52.44 MG/DL
CREAT UR-MCNC: 55.17 MG/DL
ERYTHROCYTE [DISTWIDTH] IN BLOOD BY AUTOMATED COUNT: 46.6 FL (ref 35.9–50)
GFR SERPLBLD CREATININE-BSD FMLA CKD-EPI: 66 ML/MIN/1.73 M 2
GLUCOSE SERPL-MCNC: 131 MG/DL (ref 65–99)
GLUCOSE UR STRIP.AUTO-MCNC: >=1000 MG/DL
HCT VFR BLD AUTO: 46 % (ref 37–47)
HGB BLD-MCNC: 15.1 G/DL (ref 12–16)
KETONES UR STRIP.AUTO-MCNC: NEGATIVE MG/DL
LEUKOCYTE ESTERASE UR QL STRIP.AUTO: NEGATIVE
MCH RBC QN AUTO: 30.4 PG (ref 27–33)
MCHC RBC AUTO-ENTMCNC: 32.8 G/DL (ref 33.6–35)
MCV RBC AUTO: 92.6 FL (ref 81.4–97.8)
MICRO URNS: ABNORMAL
MICROALBUMIN UR-MCNC: <1.2 MG/DL
MICROALBUMIN/CREAT UR: NORMAL MG/G (ref 0–30)
NITRITE UR QL STRIP.AUTO: NEGATIVE
PH UR STRIP.AUTO: 5.5 [PH] (ref 5–8)
PHOSPHATE SERPL-MCNC: 4.7 MG/DL (ref 2.5–4.5)
PLATELET # BLD AUTO: 359 K/UL (ref 164–446)
PMV BLD AUTO: 9.2 FL (ref 9–12.9)
POTASSIUM SERPL-SCNC: 4.3 MMOL/L (ref 3.6–5.5)
PROT UR QL STRIP: NEGATIVE MG/DL
PROT UR-MCNC: 5 MG/DL (ref 0–15)
PROT/CREAT UR: 91 MG/G (ref 10–107)
PTH-INTACT SERPL-MCNC: 19.2 PG/ML (ref 14–72)
RBC # BLD AUTO: 4.97 M/UL (ref 4.2–5.4)
RBC UR QL AUTO: NEGATIVE
SODIUM SERPL-SCNC: 142 MMOL/L (ref 135–145)
SP GR UR STRIP.AUTO: 1.02
UROBILINOGEN UR STRIP.AUTO-MCNC: 0.2 MG/DL
WBC # BLD AUTO: 7.7 K/UL (ref 4.8–10.8)

## 2022-05-31 PROCEDURE — 85027 COMPLETE CBC AUTOMATED: CPT

## 2022-05-31 PROCEDURE — 82306 VITAMIN D 25 HYDROXY: CPT

## 2022-05-31 PROCEDURE — 82043 UR ALBUMIN QUANTITATIVE: CPT

## 2022-05-31 PROCEDURE — 81003 URINALYSIS AUTO W/O SCOPE: CPT

## 2022-05-31 PROCEDURE — 84100 ASSAY OF PHOSPHORUS: CPT

## 2022-05-31 PROCEDURE — 82570 ASSAY OF URINE CREATININE: CPT

## 2022-05-31 PROCEDURE — 82040 ASSAY OF SERUM ALBUMIN: CPT

## 2022-05-31 PROCEDURE — 83970 ASSAY OF PARATHORMONE: CPT

## 2022-05-31 PROCEDURE — 80048 BASIC METABOLIC PNL TOTAL CA: CPT

## 2022-05-31 PROCEDURE — 84156 ASSAY OF PROTEIN URINE: CPT

## 2022-05-31 PROCEDURE — 36415 COLL VENOUS BLD VENIPUNCTURE: CPT

## 2022-06-07 ENCOUNTER — TELEMEDICINE2 (OUTPATIENT)
Dept: NEPHROLOGY | Facility: MEDICAL CENTER | Age: 72
End: 2022-06-07
Payer: MEDICARE

## 2022-06-07 VITALS
BODY MASS INDEX: 23.57 KG/M2 | HEIGHT: 65 IN | TEMPERATURE: 97.6 F | RESPIRATION RATE: 18 BRPM | SYSTOLIC BLOOD PRESSURE: 132 MMHG | DIASTOLIC BLOOD PRESSURE: 78 MMHG | HEART RATE: 60 BPM | OXYGEN SATURATION: 98 % | WEIGHT: 141.5 LBS

## 2022-06-07 DIAGNOSIS — N18.31 STAGE 3A CHRONIC KIDNEY DISEASE: ICD-10-CM

## 2022-06-07 DIAGNOSIS — Z86.39 HISTORY OF VITAMIN D DEFICIENCY: ICD-10-CM

## 2022-06-07 DIAGNOSIS — I10 PRIMARY HYPERTENSION: ICD-10-CM

## 2022-06-07 DIAGNOSIS — E11.8 TYPE 2 DIABETES MELLITUS WITH COMPLICATION, WITHOUT LONG-TERM CURRENT USE OF INSULIN (HCC): ICD-10-CM

## 2022-06-07 PROCEDURE — 99214 OFFICE O/P EST MOD 30 MIN: CPT | Mod: 95 | Performed by: INTERNAL MEDICINE

## 2022-06-07 RX ORDER — VITAMIN B COMPLEX
1000 TABLET ORAL DAILY
COMMUNITY

## 2022-06-07 ASSESSMENT — ENCOUNTER SYMPTOMS
ABDOMINAL PAIN: 0
SHORTNESS OF BREATH: 0
FEVER: 0

## 2022-06-07 ASSESSMENT — FIBROSIS 4 INDEX: FIB4 SCORE: 0.74

## 2022-06-07 NOTE — PROGRESS NOTES
"Telemedicine Note     Please note that I could not evaluate the patient in person at the site. All examination and evaluation of the patient and information gathering from the patient and/or the family were done jointly by the requesting physician, Garcia Leonard M.D.  at the site and me via licensed and securely encrypted tele-video communication equipment with the assistance of a trained tele-presenter at the originating site.  As such, my assessments and recommendations are limited as far as such telecommnication allows.     Consulting MD: Pernell Ortiz M.D.    Consulting MD location: [facility] [place]  Requesting Physician: Garcia Leonard M.D.    Patient name:      Ting Gregory  :                    1950   MRN:                   [id number]   Data source:  Patient   Video Time:         30 minutes.   Originating site: [place], [place]  Originating site MA: not available at time of visit.      Date of Service: 2022      Chief Complaint:   Chief Complaint   Patient presents with   • Follow-Up   • Chronic Kidney Disease        History of Present Illness:  Ting Gregory is a 71 y.o. female with DM2, HTN, CKD3a who presents today for follow up.     Re: DM2, diagnosed . Patient has not had microalbuminuria. Patient has seen an eye doctor and does not have retinopathy. Her sugars are \"doing good.\"  She remains on Jardiance and metformin, started on meds .      Re: HTN, diagnosed . BP control over the years has been well controlled \"most of the time.\" She checks BP at home and gets 120-130's / 70-80's. She's on losartan and metoprolol. She had a vertigo episode back in April.     Re: CKD, denies history of kidney failure, denies history of GUILLE.  She used to take Aleve maybe 3 times a month, but stopped in 2021. She denies NSAIDs. Denies bladder troubles.          ROS:    Review of Systems   Constitutional: Negative for fever.   Respiratory: Negative for shortness of breath.    Cardiovascular: " Negative for chest pain.   Gastrointestinal: Negative for abdominal pain.   Genitourinary: Negative for dysuria.   All other systems reviewed and are negative.                  Past Medical History:  Past Medical History:   Diagnosis Date   • Anxiety    • Asthma    • Cataract    • Depression    • Diabetes mellitus type 2 in nonobese (HCC)    • Diabetic neuropathy (HCC)    • GERD (gastroesophageal reflux disease)    • History of mammogram     fibrocystic breast disease   • Hyperlipidemia    • Hypertension    • Irritable bowel syndrome    • Kidney disease    • Pelvic exam     ASCUS, then had hysterectomy   • S/P colonoscopy     2002   • Vitamin D deficiency      Past Surgical History:   Procedure Laterality Date   • ABDOMINAL HYSTERECTOMY TOTAL     • APPENDECTOMY     • SOLO BY LAPAROSCOPY     • CYSTECTOMY     • CYSTOSTOMY      in abdominal   • DILATION AND CURETTAGE      x4   • HYSTERECTOMY, TOTAL ABDOMINAL      took everything        Current Outpatient Medications   Medication Sig Dispense Refill   • metoprolol tartrate (LOPRESSOR) 25 MG Tab Take 1 Tablet by mouth 2 times a day. 180 Tablet 3   • metFORMIN ER (GLUCOPHAGE XR) 500 MG TABLET SR 24 HR TAKE TWO TABLETS BY MOUTH EVERY  Tablet 1   • JARDIANCE 25 MG Tab TAKE ONE TABLET BY MOUTH EVERY DAY 90 Tablet 3   • buPROPion SR (WELLBUTRIN-SR) 150 MG TABLET SR 12 HR sustained-release tablet TAKE 2 TABLET BY MOUTH ONCE DAILY 180 Tablet 3   • aspirin EC (ECOTRIN) 81 MG Tablet Delayed Response Take 1 Tablet by mouth every day. 90 Tablet 3   • losartan (COZAAR) 50 MG Tab Take 1 Tablet by mouth every day. 30 Tablet 11   • pantoprazole (PROTONIX) 40 MG Tablet Delayed Response TAKE 1 TABLET BY MOUTH EVERY DAY 90 Tablet 3   • Cholecalciferol (VITAMIN D3) 5000 UNIT TABS Take 1,000 mg by mouth.       No current facility-administered medications for this visit.           Allergies:   Allergies   Allergen Reactions   • Ace Inhibitors Swelling   • Augmentin      Severe rash  "  • Demerol Rash     Rash     • Fiorinal Rash     Rash     • Minipress [Fd&C Blue #1-Fd&C Red #40-Prazosin] Unspecified     Cannot remember reaction   • Prednisone Unspecified     Migraine, joint pain swelling   • Statins [Hmg-Coa-R Inhibitors]      Severe cramps   • Trilipix [Choline Fenofibrate]      Angioedema, hives   • Vicodin [Hydrocodone-Acetaminophen] Rash     Rash          Current Outpatient Medications:   (Not in a hospital admission)           Physical Exam:   Vitals:   Vitals:    06/07/22 1000   BP: 132/78   BP Location: Left arm   Patient Position: Sitting   BP Cuff Size: Adult   Pulse: 60   Resp: 18   Temp: 36.4 °C (97.6 °F)   TempSrc: Temporal   SpO2: 98%   Weight: 64.2 kg (141 lb 8 oz)   Height: 1.651 m (5' 5\")           Physical Exam:  Constitutional: Alert, no distress, well-groomed.  Skin: No rashes in visible areas.  Eye: Round. Conjunctiva clear, lids normal. No icterus.   ENMT: Lips pink without lesions, good dentition, moist mucous membranes. Phonation normal.  Neck: No masses, no thyromegaly. Moves freely without pain.  Respiratory: Unlabored respiratory effort, no cough or audible wheeze  Psych: Alert and oriented x3, normal affect and mood.           Imaging reviewed & Visualized by me:  DIAGNOSTIC IMAGING REVIEWED AND/OR INTERPRETED BY ME:         Laboratory:   Recent Labs     08/09/21  0821 08/30/21  0702 05/31/22  0651   ALBUMIN  --   --  4.2   SODIUM 140 141 142   POTASSIUM 3.8 4.1 4.3   CHLORIDE 98 104 106   CO2 30 27 23   BUN 19 15 23*   CREATININE 1.19 1.00 0.93   PHOSPHORUS  --   --  4.7*     Lab Results   Component Value Date/Time    WBC 7.7 05/31/2022 06:51 AM    WBC 6.8 01/24/2012 11:53 AM    RBC 4.97 05/31/2022 06:51 AM    RBC 4.73 01/24/2012 11:53 AM    HEMOGLOBIN 15.1 05/31/2022 06:51 AM    HEMATOCRIT 46.0 05/31/2022 06:51 AM    MCV 92.6 05/31/2022 06:51 AM    MCV 90 01/24/2012 11:53 AM    MCH 30.4 05/31/2022 06:51 AM    MCH 30.7 01/24/2012 11:53 AM    MCHC 32.8 (L) 05/31/2022 " 06:51 AM    MPV 9.2 05/31/2022 06:51 AM           Assessment and Plan:   Ting Gregory is a 71 y.o. female with DM2, HTN, CKD3a who presents today for follow up.    1. Stage 2-3a chronic kidney disease (HCC)  -CKD labs stable, and actually somewhat improved.  Underlying CKD likely from diabetes, hypertension, and age-related kidney decline. I explained the importance of glycemic and blood pressure control to help slow CKD progression.  Avoid NSAIDs and other nephrotoxins.  Low-salt diet.  Continue losartan for long-term kidney protection.    2. Type 2 diabetes mellitus with complication, without long-term current use of insulin (HCC)  -Controlled per patient.  Continue SGLT2 inhibitor and losartan for long-term kidney protection.  Defer further management to primary care.  - There is no need to dose adjust metformin if GFR is above 45.  If GFR falls to between 30 and 45, maximum dose of metformin is 1000 mg daily.  If GFR falls to less than 30, metformin must be discontinued.     3. Essential hypertension  -Well-controlled.  Continue losartan 50 mg daily.  If patient has any more episodes of near syncope, would recommend lower or discontinue metoprolol.          Return to clinic in 12 months with preclinic labs      I spent total of 30 minutes on face-to-face via telemonitor more than half of which was spent coordinating this with patient as well as reviewing the images and labs results with the patient, and answering all questions and explaining in details the assessment and recommendation sections above. The patient expressed their understanding and agreement to the above.        This EM service was conducted utilizing secure and encrypted videoconferencing equipment with the assistance of a trained tele-presenter at the originating site.       Pernell Ortiz MD  Nephrology

## 2022-07-07 ASSESSMENT — ENCOUNTER SYMPTOMS
DYSPNEA ON EXERTION: 0
PND: 0
NAUSEA: 0
FOCAL WEAKNESS: 0
FEVER: 0
ORTHOPNEA: 0
VOMITING: 0
IRREGULAR HEARTBEAT: 0
SHORTNESS OF BREATH: 0
COUGH: 0
WHEEZING: 0
NIGHT SWEATS: 0
ABDOMINAL PAIN: 0
DIARRHEA: 0
WEAKNESS: 0
NEAR-SYNCOPE: 0
PALPITATIONS: 0
SYNCOPE: 0
DIZZINESS: 0

## 2022-07-07 NOTE — PROGRESS NOTES
Cardiology Follow-up Consultation Note/Virtual Video Visit Note      This encounter was conducted via Zoom.   Verbal consent was obtained. Patient's identity was verified.  As a means of avoiding spread of COVID-19, this visit is being conducted by Telemedicine.    This evaluation was conducted via Zoom using secure and encrypted videoconferencing technology. The patient was in their home in the [facility].    The patient's identity was confirmed and verbal consent was obtained for this virtual visit.      Date of note:    07/08/22  Primary Care Provider: OLINDA Stevens    Patient Name: Ting Gregory   YOB: 1950  MRN:              [id number]    Chief Complaint: Follow-up near syncope    History of Present Illness: Ms. Ting Gregory is a 71 y.o. female whose current medical problems include dyslipidemia, hypertension, DM who is here for follow-up near syncope.    The patient was last seen in my clinic on 03/30/22.  Atenolol was changed to metoprolol as the event monitor showed 4 runs of SVT.  The patient has an echocardiogram as well as carotid ultrasound pending last visit (still pending this visit due to insurance reasons).    The patient returns today for follow-up. The patient reports feeling well today.  She had another near syncope episode in April but no recurrent episodes.  The patient was standing/dressing while that happened.  She quickly recovered, and had not had a recurrent episode since.  Patient reports that she felt well on metoprolol, and does not feel too different with taking atenolol.  She has been drinking more water.  She denies any chest pain or shortness of breath on exertion.  No orthopnea, PND, or leg swelling.  No palpitations.      Cardiovascular Risk Factors:  1. Smoking status: Former smoker  2. Type II Diabetes Mellitus: On medication  Lab Results   Component Value Date/Time    HBA1C 6.3 (H) 02/08/2022 07:28 AM    HBA1C 6.5 (H) 08/09/2021  08:21 AM     3. Hypertension: On medication  4. Dyslipidemia: Diet controlled  Cholesterol,Tot   Date Value Ref Range Status   03/11/2021 214 (H) 100 - 199 mg/dL Final     LDL   Date Value Ref Range Status   03/11/2021 136 (H) <100 mg/dL Final     HDL   Date Value Ref Range Status   03/11/2021 42 >=40 mg/dL Final     Triglycerides   Date Value Ref Range Status   03/11/2021 179 (H) 0 - 149 mg/dL Final     5. Family history of early Coronary Artery Disease in a first degree relative (Male less than 55 years of age; Female less than 65 years of age): None  6.  Obesity and/or Metabolic Syndrome: BMI 22.8  7. Sedentary lifestyle: Not sedentary    Review of Systems   Constitutional: Negative for fever, malaise/fatigue and night sweats.   Cardiovascular: Negative for chest pain, dyspnea on exertion, irregular heartbeat, leg swelling, near-syncope, orthopnea, palpitations, paroxysmal nocturnal dyspnea and syncope.   Respiratory: Negative for cough, shortness of breath and wheezing.    Gastrointestinal: Negative for abdominal pain, diarrhea, nausea and vomiting.   Neurological: Negative for dizziness, focal weakness and weakness.       All other systems reviewed and are negative.       Current Outpatient Medications   Medication Sig Dispense Refill   • vitamin D3 (CHOLECALCIFEROL) 1000 Unit (25 mcg) Tab Take 1,000 Units by mouth every day.     • metoprolol tartrate (LOPRESSOR) 25 MG Tab Take 1 Tablet by mouth 2 times a day. 180 Tablet 3   • metFORMIN ER (GLUCOPHAGE XR) 500 MG TABLET SR 24 HR TAKE TWO TABLETS BY MOUTH EVERY  Tablet 1   • JARDIANCE 25 MG Tab TAKE ONE TABLET BY MOUTH EVERY DAY 90 Tablet 3   • buPROPion SR (WELLBUTRIN-SR) 150 MG TABLET SR 12 HR sustained-release tablet TAKE 2 TABLET BY MOUTH ONCE DAILY 180 Tablet 3   • aspirin EC (ECOTRIN) 81 MG Tablet Delayed Response Take 1 Tablet by mouth every day. 90 Tablet 3   • losartan (COZAAR) 50 MG Tab Take 1 Tablet by mouth every day. 30 Tablet 11   •  "pantoprazole (PROTONIX) 40 MG Tablet Delayed Response TAKE 1 TABLET BY MOUTH EVERY DAY 90 Tablet 3     No current facility-administered medications for this visit.         Allergies   Allergen Reactions   • Ace Inhibitors Swelling   • Atorvastatin    • Augmentin      Severe rash   • Demerol Rash     Rash     • Fiorinal Rash     Rash     • Minipress [Fd&C Blue #1-Fd&C Red #40-Prazosin] Unspecified     Cannot remember reaction   • Prednisone Unspecified     Migraine, joint pain swelling   • Statins [Hmg-Coa-R Inhibitors]      Severe cramps   • Trilipix [Choline Fenofibrate]      Angioedema, hives   • Vicodin [Hydrocodone-Acetaminophen] Rash     Rash           Physical Exam:  Ambulatory Vitals  /77 (BP Location: Left arm, Patient Position: Sitting, BP Cuff Size: Adult)   Pulse 68   Ht 1.651 m (5' 5\")   Wt 64 kg (141 lb)    Oxygen Therapy:  Pulse Oximetry:  (no equipment)  BP Readings from Last 4 Encounters:   07/08/22 122/77   06/07/22 132/78   04/04/22 116/79   03/30/22 130/70       Weight/BMI: Body mass index is 23.46 kg/m².  Wt Readings from Last 4 Encounters:   07/08/22 64 kg (141 lb)   06/07/22 64.2 kg (141 lb 8 oz)   03/30/22 63.7 kg (140 lb 6.4 oz)   02/15/22 64.4 kg (142 lb)       General: Well appearing and in no apparent distress  HEENT: Normal  Neurological: No focal sensory deficits  Psychiatric: Appropriate affect, A/O x 3, intact judgement and insight      Lab Data Review:  Lab Results   Component Value Date/Time    CHOLSTRLTOT 214 (H) 03/11/2021 07:35 AM     (H) 03/11/2021 07:35 AM    HDL 42 03/11/2021 07:35 AM    TRIGLYCERIDE 179 (H) 03/11/2021 07:35 AM       Lab Results   Component Value Date/Time    SODIUM 142 05/31/2022 06:51 AM    POTASSIUM 4.3 05/31/2022 06:51 AM    CHLORIDE 106 05/31/2022 06:51 AM    CO2 23 05/31/2022 06:51 AM    GLUCOSE 131 (H) 05/31/2022 06:51 AM    BUN 23 (H) 05/31/2022 06:51 AM    CREATININE 0.93 05/31/2022 06:51 AM    CREATININE 0.75 01/24/2012 11:53 AM    " BUNCREATRAT 24 01/24/2012 11:53 AM     Lab Results   Component Value Date/Time    ALKPHOSPHAT 78 03/11/2021 07:35 AM    ASTSGOT 20 03/11/2021 07:35 AM    ALTSGPT 28 03/11/2021 07:35 AM    TBILIRUBIN 0.6 03/11/2021 07:35 AM      Lab Results   Component Value Date/Time    WBC 7.7 05/31/2022 06:51 AM    WBC 6.8 01/24/2012 11:53 AM     Lab Results   Component Value Date/Time    HBA1C 6.3 (H) 02/08/2022 07:28 AM    HBA1C 6.5 (H) 08/09/2021 08:21 AM         Cardiac Imaging and Procedures Review:    EKG dated 10/11/2021: My personal interpretation is sinus rhythm, low voltage in frontal leads    No prior echocardiogram    Event monitor 2/2022  48 Hour Holter Summary:   Sinus rhythm, ave HR 61 bpm, range  bpm.   No pauses.   Rare PVCs.   PACs present.   Brief atrial runs, up to 12 beats, up to 146 bpm, most c/w atrial   tachycardia.   No sustained rhythm changes.   No symptoms reported.      Assessment & Plan     1. Paroxysmal SVT (supraventricular tachycardia) (HCC)     2. Near syncope     3. Essential hypertension     4. Dyslipidemia     5. Statin intolerance           Shared Medical Decision Making:    Near syncope, improved  Paroxysmal SVT  The patient had 4 runs of SVT and occasional PACs, otherwise normal, no pauses.  -Continue metoprolol 25 mg twice daily  -Counseled on lifestyle changes such as remaining well-hydrated and changing positions slowly/getting out of bed slowly.   -Patient has carotid ultrasound and echocardiogram, not scheduled yet due to insurance reasons.  She would like to hold off the studies for now as she is feeling quite well    Hypertension  BP well controlled this visit  -Continue losartan 50mg daily   -Continue metoprolol as above  -Patient instructed to measure BP at home.      Dyslipidemia  Statin intolerance  -Patient is allergic to statins.  Age is beyond statin benefit group at this time  -Continue aspirin 81mg daily  -Counseled on healthy diet    All of the patient's excellent  questions were answered to the best of my knowledge and to her satisfaction.  It was a pleasure seeing Ms. Ting Gregory in my clinic today. Return in about 1 year (around 7/8/2023), or if symptoms worsen or fail to improve. Patient is aware to call the cardiology clinic with any questions or concerns.      Paloma Finley MD  [facility]  [address] [place] [id number]  Phone: [phone]  Fax: [phone]

## 2022-07-08 ENCOUNTER — TELEMEDICINE (OUTPATIENT)
Dept: CARDIOLOGY | Facility: MEDICAL CENTER | Age: 72
End: 2022-07-08
Payer: MEDICARE

## 2022-07-08 VITALS
WEIGHT: 141 LBS | SYSTOLIC BLOOD PRESSURE: 122 MMHG | DIASTOLIC BLOOD PRESSURE: 77 MMHG | BODY MASS INDEX: 23.49 KG/M2 | HEIGHT: 65 IN | HEART RATE: 68 BPM

## 2022-07-08 DIAGNOSIS — Z78.9 STATIN INTOLERANCE: ICD-10-CM

## 2022-07-08 DIAGNOSIS — I10 ESSENTIAL HYPERTENSION: ICD-10-CM

## 2022-07-08 DIAGNOSIS — I47.10 PAROXYSMAL SVT (SUPRAVENTRICULAR TACHYCARDIA) (HCC): ICD-10-CM

## 2022-07-08 DIAGNOSIS — R55 NEAR SYNCOPE: ICD-10-CM

## 2022-07-08 DIAGNOSIS — E78.5 DYSLIPIDEMIA: ICD-10-CM

## 2022-07-08 PROCEDURE — 99214 OFFICE O/P EST MOD 30 MIN: CPT | Mod: 95 | Performed by: STUDENT IN AN ORGANIZED HEALTH CARE EDUCATION/TRAINING PROGRAM

## 2022-07-08 ASSESSMENT — FIBROSIS 4 INDEX: FIB4 SCORE: 0.74

## 2022-08-07 DIAGNOSIS — F33.1 MAJOR DEPRESSIVE DISORDER, RECURRENT EPISODE, MODERATE (HCC): ICD-10-CM

## 2022-08-07 RX ORDER — BUPROPION HYDROCHLORIDE 150 MG/1
300 TABLET, EXTENDED RELEASE ORAL DAILY
Qty: 180 TABLET | Refills: 3 | Status: CANCELLED | OUTPATIENT
Start: 2022-08-07

## 2022-08-07 RX ORDER — PANTOPRAZOLE SODIUM 40 MG/1
40 TABLET, DELAYED RELEASE ORAL
Qty: 90 TABLET | Refills: 3 | Status: CANCELLED | OUTPATIENT
Start: 2022-08-07

## 2022-08-08 NOTE — TELEPHONE ENCOUNTER
Received request via: Patient    Was the patient seen in the last year in this department? Yes    Does the patient have an active prescription (recently filled or refills available) for medication(s) requested? No     Last OV 2/15/2022  Next OV 8/19/2022  Last Labs 5/31/2022      Refill Request for Wellbutrin has been denied. The refill request was not appropriate.

## 2022-08-09 RX ORDER — PANTOPRAZOLE SODIUM 40 MG/1
40 TABLET, DELAYED RELEASE ORAL
Qty: 90 TABLET | Refills: 3 | Status: SHIPPED | OUTPATIENT
Start: 2022-08-09 | End: 2023-08-08

## 2022-08-09 RX ORDER — PANTOPRAZOLE SODIUM 40 MG/1
TABLET, DELAYED RELEASE ORAL
Qty: 90 TABLET | Refills: 3 | OUTPATIENT
Start: 2022-08-09

## 2022-08-11 ENCOUNTER — HOSPITAL ENCOUNTER (OUTPATIENT)
Dept: LAB | Facility: MEDICAL CENTER | Age: 72
End: 2022-08-11
Attending: FAMILY MEDICINE
Payer: MEDICARE

## 2022-08-11 DIAGNOSIS — E11.8 TYPE 2 DIABETES MELLITUS WITH COMPLICATION, WITHOUT LONG-TERM CURRENT USE OF INSULIN (HCC): ICD-10-CM

## 2022-08-11 LAB
EST. AVERAGE GLUCOSE BLD GHB EST-MCNC: 134 MG/DL
HBA1C MFR BLD: 6.3 % (ref 4–5.6)

## 2022-08-11 PROCEDURE — 36415 COLL VENOUS BLD VENIPUNCTURE: CPT | Mod: GA

## 2022-08-11 PROCEDURE — 83036 HEMOGLOBIN GLYCOSYLATED A1C: CPT | Mod: GA

## 2022-08-11 RX ORDER — METFORMIN HYDROCHLORIDE 500 MG/1
1000 TABLET, EXTENDED RELEASE ORAL
Qty: 180 TABLET | Refills: 3 | Status: SHIPPED | OUTPATIENT
Start: 2022-08-11 | End: 2023-08-11 | Stop reason: SDUPTHER

## 2022-08-19 ENCOUNTER — OFFICE VISIT (OUTPATIENT)
Dept: MEDICAL GROUP | Facility: PHYSICIAN GROUP | Age: 72
End: 2022-08-19
Payer: MEDICARE

## 2022-08-19 VITALS
WEIGHT: 141 LBS | BODY MASS INDEX: 23.49 KG/M2 | OXYGEN SATURATION: 96 % | DIASTOLIC BLOOD PRESSURE: 70 MMHG | SYSTOLIC BLOOD PRESSURE: 138 MMHG | HEART RATE: 67 BPM | HEIGHT: 65 IN | TEMPERATURE: 97.8 F | RESPIRATION RATE: 16 BRPM

## 2022-08-19 DIAGNOSIS — N18.31 STAGE 3A CHRONIC KIDNEY DISEASE: ICD-10-CM

## 2022-08-19 DIAGNOSIS — R55 NEAR SYNCOPE: ICD-10-CM

## 2022-08-19 DIAGNOSIS — E11.8 TYPE 2 DIABETES MELLITUS WITH COMPLICATION, WITHOUT LONG-TERM CURRENT USE OF INSULIN (HCC): ICD-10-CM

## 2022-08-19 DIAGNOSIS — B35.1 ONYCHOMYCOSIS: ICD-10-CM

## 2022-08-19 DIAGNOSIS — E78.2 MIXED HYPERLIPIDEMIA: ICD-10-CM

## 2022-08-19 PROCEDURE — 99214 OFFICE O/P EST MOD 30 MIN: CPT | Performed by: FAMILY MEDICINE

## 2022-08-19 ASSESSMENT — FIBROSIS 4 INDEX: FIB4 SCORE: 0.75

## 2022-08-19 NOTE — ASSESSMENT & PLAN NOTE
She has episodes when she feels loss of balance, feels like someone pushes her back, she knows she is going down. She has tried grabbing things but this does not help. She cannot go forward to grab anything. Always falls backwards. Before falling each time she has a weird awareness a second before she falls. This happens each time.   She never passes out, just falls down. She feels fine as she is down on the floor.   Last fall was she fell and scraped her back against cabinet handles. Last episode was April 2022, frequency about once in 4 months  She was evaluated by cardiology, had  holter, carotid US and echo ordered. Can get it done if worsening or inc freq.   detailed exam

## 2022-08-19 NOTE — ASSESSMENT & PLAN NOTE
A1c:   Lab Results   Component Value Date/Time    HBA1C 6.3 (H) 08/11/2022 0846    AVGLUC 134 08/11/2022 0846     Lipids:   Lab Results   Component Value Date/Time    CHOLSTRLTOT 214 (H) 03/11/2021 07:35 AM    TRIGLYCERIDE 179 (H) 03/11/2021 07:35 AM    HDL 42 03/11/2021 07:35 AM     (H) 03/11/2021 07:35 AM   ]  BMP:   Lab Results   Component Value Date/Time    SODIUM 142 05/31/2022 0651    POTASSIUM 4.3 05/31/2022 0651    CHLORIDE 106 05/31/2022 0651    CO2 23 05/31/2022 0651    GLUCOSE 131 (H) 05/31/2022 0651    BUN 23 (H) 05/31/2022 0651    CREATININE 0.93 05/31/2022 0651    CALCIUM 9.3 05/31/2022 0651    ANION 13.0 05/31/2022 0651     GFR:   Lab Results   Component Value Date/Time    IFAFR[place] >60 08/30/2021 0702    IFNOTAFR 55 (A) 08/30/2021 0702     Last eye exam: up to date  Foot exam:   Medications: met., jard, asa, losartan  Statin intolerance

## 2022-08-19 NOTE — PROGRESS NOTES
Subjective:   Ting Gregory is a 72 y.o. female here today for evaluation and management of:     Near syncope  She has episodes when she feels loss of balance, feels like someone pushes her back, she knows she is going down. She has tried grabbing things but this does not help. She cannot go forward to grab anything. Always falls backwards. Before falling each time she has a weird awareness a second before she falls. This happens each time.   She never passes out, just falls down. She feels fine as she is down on the floor.   Last fall was she fell and scraped her back against cabinet handles. Last episode was April 2022, frequency about once in 4 months  She was evaluated by cardiology, had  holter, carotid US and echo ordered. Can get it done if worsening or inc freq.    Onychomycosis  Left foot great toe half and right foot great toe small area with yellowing, thickening.   wlll try topical antifungal  If not working can try oral terbinafine with checking liver labs.     Stage 3a chronic kidney disease (HCC)  Stable, nephrology will follow annually.     Type 2 diabetes mellitus with complication, without long-term current use of insulin ([facility])  A1c:   Lab Results   Component Value Date/Time    HBA1C 6.3 (H) 08/11/2022 0846    AVGLUC 134 08/11/2022 0846     Lipids:   Lab Results   Component Value Date/Time    CHOLSTRLTOT 214 (H) 03/11/2021 07:35 AM    TRIGLYCERIDE 179 (H) 03/11/2021 07:35 AM    HDL 42 03/11/2021 07:35 AM     (H) 03/11/2021 07:35 AM   ]  BMP:   Lab Results   Component Value Date/Time    SODIUM 142 05/31/2022 0651    POTASSIUM 4.3 05/31/2022 0651    CHLORIDE 106 05/31/2022 0651    CO2 23 05/31/2022 0651    GLUCOSE 131 (H) 05/31/2022 0651    BUN 23 (H) 05/31/2022 0651    CREATININE 0.93 05/31/2022 0651    CALCIUM 9.3 05/31/2022 0651    ANION 13.0 05/31/2022 0651     GFR:   Lab Results   Component Value Date/Time    IFA[place] >60 08/30/2021 0702    IFNOTAFR 55 (A) 08/30/2021 0702     Last  eye exam: up to date  Foot exam:   Medications: met., jard, asa, losartan  Statin intolerance       Current medicines (including changes today)  Current Outpatient Medications   Medication Sig Dispense Refill    Efinaconazole 10 % Solution Apply a layer once daily to affected tonails and surrounding skin twice a day for 48 days. 8 mL 2    metFORMIN ER (GLUCOPHAGE XR) 500 MG TABLET SR 24 HR Take 2 Tablets by mouth every day. 180 Tablet 3    pantoprazole (PROTONIX) 40 MG Tablet Delayed Response Take 1 Tablet by mouth every day. 90 Tablet 3    losartan (COZAAR) 50 MG Tab Take 1 Tablet by mouth every day. 90 Tablet 3    vitamin D3 (CHOLECALCIFEROL) 1000 Unit (25 mcg) Tab Take 1,000 Units by mouth every day.      metoprolol tartrate (LOPRESSOR) 25 MG Tab Take 1 Tablet by mouth 2 times a day. 180 Tablet 3    JARDIANCE 25 MG Tab TAKE ONE TABLET BY MOUTH EVERY DAY 90 Tablet 3    buPROPion SR (WELLBUTRIN-SR) 150 MG TABLET SR 12 HR sustained-release tablet TAKE 2 TABLET BY MOUTH ONCE DAILY 180 Tablet 3    aspirin EC (ECOTRIN) 81 MG Tablet Delayed Response Take 1 Tablet by mouth every day. 90 Tablet 3     No current facility-administered medications for this visit.     She  has a past medical history of Anxiety, Asthma, Cataract, Depression, Diabetes mellitus type 2 in nonobese ([facility]), Diabetic neuropathy ([facility]), GERD (gastroesophageal reflux disease), History of mammogram, Hyperlipidemia, Hypertension, Irritable bowel syndrome, Kidney disease, Pelvic exam, S/P colonoscopy, and Vitamin D deficiency.    She has no past medical history of Anemia, Arrhythmia, Arthritis, Blood transfusion without reported diagnosis, Breast cancer ([facility]), Cancer ([facility]), Clotting disorder ([facility]), COPD (chronic obstructive pulmonary disease) ([facility]), Encounter for long-term (current) use of other medications, Glaucoma, Heart attack ([facility]), Heart murmur, HIV (human immunodeficiency virus infection) ([facility]), Migraine, Seizure ([facility]), Stroke ([facility]), Substance abuse  "(HCC), or Thyroid disease.    ROS  No chest pain, no shortness of breath, no abdominal pain       Objective:     /70 (BP Location: Left arm, Patient Position: Sitting, BP Cuff Size: Adult)   Pulse 67   Temp 36.6 °C (97.8 °F) (Temporal)   Resp 16   Ht 1.651 m (5' 5\")   Wt 64 kg (141 lb)   SpO2 96%  Body mass index is 23.46 kg/m².   Physical Exam:  Constitutional: Alert, no distress.  Skin: Warm, dry, good turgor, no rashes in visible areas.  Eye: Equal, round and reactive, conjunctiva clear, lids normal.  ENMT: Lips without lesions, good dentition, oropharynx clear.  Neck: Trachea midline, no masses, no thyromegaly. No cervical or supraclavicular lymphadenopathy  Respiratory: Unlabored respiratory effort, lungs clear to auscultation, no wheezes, no ronchi.  Cardiovascular: Normal S1, S2, no murmur, no edema.  Abdomen: Soft, non-tender, no masses, no hepatosplenomegaly.  Psych: Alert and oriented x3, normal affect and mood.        Assessment and Plan:   The following treatment plan was discussed    1. Mixed hyperlipidemia  - Lipid Profile; Future    2. Type 2 diabetes mellitus with complication, without long-term current use of insulin ([facility])  - Diabetic Monofilament LE Exam  - HEMOGLOBIN A1C; Future    3. Near syncope    4. Onychomycosis    5. Stage 3a chronic kidney disease ([facility])  - Comp Metabolic Panel; Future    Other orders  - Efinaconazole 10 % Solution; Apply a layer once daily to affected tonails and surrounding skin twice a day for 48 days.  Dispense: 8 mL; Refill: 2      Followup: Return in about 6 months (around 2/19/2023) for Lab Review, Diabetes, onychomycosis, near syncope/falls.           "

## 2022-08-19 NOTE — PATIENT INSTRUCTIONS
Please get fasting labs, fasting for 8-10 hours before next visit. You can make an appointment for the lab or walk in.   Lab hours [facility] location: Monday to Friday 6 am - 4 pm, Saturday 7 am -noon  Even if you lose your lab paperwork, you can still come in to get your lab done.

## 2022-08-19 NOTE — ASSESSMENT & PLAN NOTE
Left foot great toe half and right foot great toe small area with yellowing, thickening.   wlll try topical antifungal  If not working can try oral terbinafine with checking liver labs.

## 2022-08-30 NOTE — TELEPHONE ENCOUNTER
Received request via: Pharmacy    Was the patient seen in the last year in this department? Yes   LOV 11/11/2021    Does the patient have an active prescription (recently filled or refills available) for medication(s) requested? No  
none

## 2022-11-07 DIAGNOSIS — F33.1 MAJOR DEPRESSIVE DISORDER, RECURRENT EPISODE, MODERATE (HCC): ICD-10-CM

## 2022-11-07 NOTE — TELEPHONE ENCOUNTER
Received request via: Pharmacy    Was the patient seen in the last year in this department? Yes    Does the patient have an active prescription (recently filled or refills available) for medication(s) requested? No    Last OV 8/19/2022  Next OV 2/22/2023

## 2022-11-08 RX ORDER — BUPROPION HYDROCHLORIDE 150 MG/1
TABLET, EXTENDED RELEASE ORAL
Qty: 180 TABLET | Refills: 3 | Status: SHIPPED | OUTPATIENT
Start: 2022-11-08 | End: 2023-11-07

## 2022-12-05 DIAGNOSIS — E11.8 TYPE 2 DIABETES MELLITUS WITH COMPLICATION, WITHOUT LONG-TERM CURRENT USE OF INSULIN (HCC): ICD-10-CM

## 2022-12-05 RX ORDER — EMPAGLIFLOZIN 25 MG/1
TABLET, FILM COATED ORAL
Qty: 90 TABLET | Refills: 3 | Status: SHIPPED | OUTPATIENT
Start: 2022-12-05 | End: 2023-12-06

## 2022-12-05 NOTE — TELEPHONE ENCOUNTER
Received request via: Pharmacy    Was the patient seen in the last year in this department? Yes    Does the patient have an active prescription (recently filled or refills available) for medication(s) requested? No    Does the patient have nursing home Plus and need 100 day supply (blood pressure, diabetes and cholesterol meds only)? Patient does not have SCP    Last OV 8/19/2022   Next OV 2/22/2023

## 2023-02-15 ENCOUNTER — HOSPITAL ENCOUNTER (OUTPATIENT)
Dept: LAB | Facility: MEDICAL CENTER | Age: 73
End: 2023-02-15
Attending: FAMILY MEDICINE
Payer: COMMERCIAL

## 2023-02-15 DIAGNOSIS — E78.2 MIXED HYPERLIPIDEMIA: ICD-10-CM

## 2023-02-15 DIAGNOSIS — E11.8 TYPE 2 DIABETES MELLITUS WITH COMPLICATION, WITHOUT LONG-TERM CURRENT USE OF INSULIN (HCC): ICD-10-CM

## 2023-02-15 DIAGNOSIS — Z86.39 HISTORY OF VITAMIN D DEFICIENCY: ICD-10-CM

## 2023-02-15 DIAGNOSIS — I10 PRIMARY HYPERTENSION: ICD-10-CM

## 2023-02-15 DIAGNOSIS — N18.31 STAGE 3A CHRONIC KIDNEY DISEASE: ICD-10-CM

## 2023-02-15 LAB
25(OH)D3 SERPL-MCNC: 46 NG/ML (ref 30–100)
APPEARANCE UR: CLEAR
BACTERIA #/AREA URNS HPF: NEGATIVE /HPF
BILIRUB UR QL STRIP.AUTO: NEGATIVE
COLOR UR: YELLOW
CREAT UR-MCNC: 80.88 MG/DL
EPI CELLS #/AREA URNS HPF: NEGATIVE /HPF
ERYTHROCYTE [DISTWIDTH] IN BLOOD BY AUTOMATED COUNT: 46.1 FL (ref 35.9–50)
GLUCOSE UR STRIP.AUTO-MCNC: >=1000 MG/DL
HCT VFR BLD AUTO: 44.2 % (ref 37–47)
HGB BLD-MCNC: 14.7 G/DL (ref 12–16)
HYALINE CASTS #/AREA URNS LPF: ABNORMAL /LPF
KETONES UR STRIP.AUTO-MCNC: NEGATIVE MG/DL
LEUKOCYTE ESTERASE UR QL STRIP.AUTO: ABNORMAL
MCH RBC QN AUTO: 30.2 PG (ref 27–33)
MCHC RBC AUTO-ENTMCNC: 33.3 G/DL (ref 33.6–35)
MCV RBC AUTO: 90.9 FL (ref 81.4–97.8)
MICRO URNS: ABNORMAL
NITRITE UR QL STRIP.AUTO: NEGATIVE
PH UR STRIP.AUTO: 5.5 [PH] (ref 5–8)
PLATELET # BLD AUTO: 342 K/UL (ref 164–446)
PMV BLD AUTO: 9.3 FL (ref 9–12.9)
PROT UR QL STRIP: NEGATIVE MG/DL
PROT UR-MCNC: 6 MG/DL (ref 0–15)
PROT/CREAT UR: 74 MG/G (ref 10–107)
PTH-INTACT SERPL-MCNC: 21.4 PG/ML (ref 14–72)
RBC # BLD AUTO: 4.86 M/UL (ref 4.2–5.4)
RBC # URNS HPF: ABNORMAL /HPF
RBC UR QL AUTO: NEGATIVE
SP GR UR STRIP.AUTO: 1.03
UROBILINOGEN UR STRIP.AUTO-MCNC: 0.2 MG/DL
WBC # BLD AUTO: 6.7 K/UL (ref 4.8–10.8)
WBC #/AREA URNS HPF: ABNORMAL /HPF

## 2023-02-15 PROCEDURE — 82040 ASSAY OF SERUM ALBUMIN: CPT | Mod: XU

## 2023-02-15 PROCEDURE — 36415 COLL VENOUS BLD VENIPUNCTURE: CPT

## 2023-02-15 PROCEDURE — 83036 HEMOGLOBIN GLYCOSYLATED A1C: CPT | Mod: GA

## 2023-02-15 PROCEDURE — 82570 ASSAY OF URINE CREATININE: CPT | Mod: 91

## 2023-02-15 PROCEDURE — 84100 ASSAY OF PHOSPHORUS: CPT

## 2023-02-15 PROCEDURE — 82043 UR ALBUMIN QUANTITATIVE: CPT

## 2023-02-15 PROCEDURE — 81001 URINALYSIS AUTO W/SCOPE: CPT

## 2023-02-15 PROCEDURE — 80061 LIPID PANEL: CPT

## 2023-02-15 PROCEDURE — 80053 COMPREHEN METABOLIC PANEL: CPT

## 2023-02-15 PROCEDURE — 80048 BASIC METABOLIC PNL TOTAL CA: CPT

## 2023-02-15 PROCEDURE — 83970 ASSAY OF PARATHORMONE: CPT

## 2023-02-15 PROCEDURE — 85027 COMPLETE CBC AUTOMATED: CPT

## 2023-02-15 PROCEDURE — 84156 ASSAY OF PROTEIN URINE: CPT

## 2023-02-15 PROCEDURE — 82306 VITAMIN D 25 HYDROXY: CPT

## 2023-02-16 LAB
ALBUMIN SERPL BCP-MCNC: 4.4 G/DL (ref 3.2–4.9)
ALBUMIN SERPL BCP-MCNC: 4.4 G/DL (ref 3.2–4.9)
ALBUMIN/GLOB SERPL: 2 G/DL
ALP SERPL-CCNC: 78 U/L (ref 30–99)
ALT SERPL-CCNC: 20 U/L (ref 2–50)
ANION GAP SERPL CALC-SCNC: 12 MMOL/L (ref 7–16)
ANION GAP SERPL CALC-SCNC: 12 MMOL/L (ref 7–16)
AST SERPL-CCNC: 14 U/L (ref 12–45)
BILIRUB SERPL-MCNC: 0.5 MG/DL (ref 0.1–1.5)
BUN SERPL-MCNC: 22 MG/DL (ref 8–22)
BUN SERPL-MCNC: 22 MG/DL (ref 8–22)
CALCIUM ALBUM COR SERPL-MCNC: 9 MG/DL (ref 8.5–10.5)
CALCIUM SERPL-MCNC: 9.3 MG/DL (ref 8.5–10.5)
CALCIUM SERPL-MCNC: 9.4 MG/DL (ref 8.5–10.5)
CHLORIDE SERPL-SCNC: 105 MMOL/L (ref 96–112)
CHLORIDE SERPL-SCNC: 105 MMOL/L (ref 96–112)
CHOLEST SERPL-MCNC: 245 MG/DL (ref 100–199)
CO2 SERPL-SCNC: 24 MMOL/L (ref 20–33)
CO2 SERPL-SCNC: 24 MMOL/L (ref 20–33)
CREAT SERPL-MCNC: 0.94 MG/DL (ref 0.5–1.4)
CREAT SERPL-MCNC: 0.95 MG/DL (ref 0.5–1.4)
CREAT UR-MCNC: 83.03 MG/DL
EST. AVERAGE GLUCOSE BLD GHB EST-MCNC: 143 MG/DL
FASTING STATUS PATIENT QL REPORTED: NORMAL
GFR SERPLBLD CREATININE-BSD FMLA CKD-EPI: 64 ML/MIN/1.73 M 2
GFR SERPLBLD CREATININE-BSD FMLA CKD-EPI: 65 ML/MIN/1.73 M 2
GLOBULIN SER CALC-MCNC: 2.2 G/DL (ref 1.9–3.5)
GLUCOSE SERPL-MCNC: 116 MG/DL (ref 65–99)
GLUCOSE SERPL-MCNC: 116 MG/DL (ref 65–99)
HBA1C MFR BLD: 6.6 % (ref 4–5.6)
HDLC SERPL-MCNC: 48 MG/DL
LDLC SERPL CALC-MCNC: 162 MG/DL
MICROALBUMIN UR-MCNC: <1.2 MG/DL
MICROALBUMIN/CREAT UR: NORMAL MG/G (ref 0–30)
PHOSPHATE SERPL-MCNC: 4 MG/DL (ref 2.5–4.5)
POTASSIUM SERPL-SCNC: 4 MMOL/L (ref 3.6–5.5)
POTASSIUM SERPL-SCNC: 4.1 MMOL/L (ref 3.6–5.5)
PROT SERPL-MCNC: 6.6 G/DL (ref 6–8.2)
SODIUM SERPL-SCNC: 141 MMOL/L (ref 135–145)
SODIUM SERPL-SCNC: 141 MMOL/L (ref 135–145)
TRIGL SERPL-MCNC: 173 MG/DL (ref 0–149)

## 2023-02-22 ENCOUNTER — OFFICE VISIT (OUTPATIENT)
Dept: MEDICAL GROUP | Facility: PHYSICIAN GROUP | Age: 73
End: 2023-02-22
Payer: COMMERCIAL

## 2023-02-22 VITALS
HEART RATE: 63 BPM | SYSTOLIC BLOOD PRESSURE: 130 MMHG | BODY MASS INDEX: 22.82 KG/M2 | WEIGHT: 137 LBS | TEMPERATURE: 97.6 F | DIASTOLIC BLOOD PRESSURE: 84 MMHG | HEIGHT: 65 IN | RESPIRATION RATE: 14 BRPM | OXYGEN SATURATION: 97 %

## 2023-02-22 DIAGNOSIS — E11.8 TYPE 2 DIABETES MELLITUS WITH COMPLICATION, WITHOUT LONG-TERM CURRENT USE OF INSULIN (HCC): ICD-10-CM

## 2023-02-22 DIAGNOSIS — F33.1 MAJOR DEPRESSIVE DISORDER, RECURRENT EPISODE, MODERATE (HCC): ICD-10-CM

## 2023-02-22 DIAGNOSIS — N18.31 STAGE 3A CHRONIC KIDNEY DISEASE: ICD-10-CM

## 2023-02-22 DIAGNOSIS — E78.2 MIXED HYPERLIPIDEMIA: ICD-10-CM

## 2023-02-22 PROCEDURE — 99214 OFFICE O/P EST MOD 30 MIN: CPT | Performed by: FAMILY MEDICINE

## 2023-02-22 RX ORDER — EZETIMIBE 10 MG/1
10 TABLET ORAL DAILY
Qty: 90 TABLET | Refills: 3 | Status: SHIPPED | OUTPATIENT
Start: 2023-02-22 | End: 2024-02-05 | Stop reason: SDUPTHER

## 2023-02-22 ASSESSMENT — PATIENT HEALTH QUESTIONNAIRE - PHQ9
9. THOUGHTS THAT YOU WOULD BE BETTER OFF DEAD, OR OF HURTING YOURSELF: SEVERAL DAYS
2. FEELING DOWN, DEPRESSED, IRRITABLE, OR HOPELESS: NEARLY EVERY DAY
6. FEELING BAD ABOUT YOURSELF - OR THAT YOU ARE A FAILURE OR HAVE LET YOURSELF OR YOUR FAMILY DOWN: SEVERAL DAYS
8. MOVING OR SPEAKING SO SLOWLY THAT OTHER PEOPLE COULD HAVE NOTICED. OR THE OPPOSITE, BEING SO FIGETY OR RESTLESS THAT YOU HAVE BEEN MOVING AROUND A LOT MORE THAN USUAL: NOT AT ALL
4. FEELING TIRED OR HAVING LITTLE ENERGY: NOT AT ALL
SUM OF ALL RESPONSES TO PHQ9 QUESTIONS 1 AND 2: 6
7. TROUBLE CONCENTRATING ON THINGS, SUCH AS READING THE NEWSPAPER OR WATCHING TELEVISION: NOT AT ALL
5. POOR APPETITE OR OVEREATING: SEVERAL DAYS
1. LITTLE INTEREST OR PLEASURE IN DOING THINGS: NEARLY EVERY DAY
3. TROUBLE FALLING OR STAYING ASLEEP OR SLEEPING TOO MUCH: NOT AT ALL
SUM OF ALL RESPONSES TO PHQ QUESTIONS 1-9: 9

## 2023-02-22 ASSESSMENT — FIBROSIS 4 INDEX: FIB4 SCORE: 0.65
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
## 2023-02-22 NOTE — PATIENT INSTRUCTIONS
Please get fasting labs, fasting for 8-10 hours before next visit. You can make an appointment for the lab or walk in.   Lab hours [facility] location: Monday to Friday 6 am - 4 pm, Saturday 7 am -noon  Even if you lose your lab paperwork, you can still come in to get your lab done.

## 2023-02-23 NOTE — ASSESSMENT & PLAN NOTE
The 10-year ASCVD risk score (Rodriguez AOYUB, et al., 2019) is: 27.7%    Values used to calculate the score:      Age: 72 years      Sex: Female      Is Non- : No      Diabetic: Yes      Tobacco smoker: No      Systolic Blood Pressure: 130 mmHg      Is BP treated: Yes      HDL Cholesterol: 48 mg/dL      Total Cholesterol: 245 mg/dL     Myalgia with statin  Start zetia.   Repeat labs fasting.
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
## 2023-02-23 NOTE — ASSESSMENT & PLAN NOTE
A1c:   Lab Results   Component Value Date/Time    HBA1C 6.6 (H) 02/15/2023 0752    AVGLUC 143 02/15/2023 0752     Lipids:   Lab Results   Component Value Date/Time    CHOLSTRLTOT 245 (H) 02/15/2023 07:52 AM    TRIGLYCERIDE 173 (H) 02/15/2023 07:52 AM    HDL 48 02/15/2023 07:52 AM     (H) 02/15/2023 07:52 AM   ]  BMP:   Lab Results   Component Value Date/Time    SODIUM 141 02/15/2023 0752    POTASSIUM 4.1 02/15/2023 0752    CHLORIDE 105 02/15/2023 0752    CO2 24 02/15/2023 0752    GLUCOSE 116 (H) 02/15/2023 0752    BUN 22 02/15/2023 0752    CREATININE 0.95 02/15/2023 0752    CALCIUM 9.3 02/15/2023 0752    ANION 12.0 02/15/2023 0752     GFR:   Lab Results   Component Value Date/Time    [place] >60 08/30/2021 0702    IFNOTAFR 55 (A) 08/30/2021 0702     Last eye exam: up to date  Foot exam: up to date, no callus or deformities  Medications: metformin 500mg 2 tabs daily,   jardiance 25 mg daily  zetia added  Continue losartan

## 2023-03-19 DIAGNOSIS — I10 ESSENTIAL HYPERTENSION: ICD-10-CM

## 2023-03-19 DIAGNOSIS — I47.10 PAROXYSMAL SVT (SUPRAVENTRICULAR TACHYCARDIA) (HCC): ICD-10-CM

## 2023-03-19 DIAGNOSIS — I49.1 PREMATURE ATRIAL COMPLEXES: ICD-10-CM

## 2023-03-20 NOTE — TELEPHONE ENCOUNTER
Is the patient due for a refill? Yes    Was the patient seen the past year? Yes    Date of last office visit: 7/8/2022    Does the patient have an upcoming appointment?  Yes   If yes, When? 7/11/2023    Provider to refill:HK    Does the patients insurance require a 100 day supply?  No

## 2023-06-06 ENCOUNTER — TELEMEDICINE (OUTPATIENT)
Dept: NEPHROLOGY | Facility: MEDICAL CENTER | Age: 73
End: 2023-06-06
Payer: COMMERCIAL

## 2023-06-06 VITALS
BODY MASS INDEX: 23.16 KG/M2 | OXYGEN SATURATION: 99 % | TEMPERATURE: 97.2 F | HEART RATE: 52 BPM | WEIGHT: 139 LBS | SYSTOLIC BLOOD PRESSURE: 122 MMHG | DIASTOLIC BLOOD PRESSURE: 82 MMHG | HEIGHT: 65 IN

## 2023-06-06 DIAGNOSIS — N18.2 CKD (CHRONIC KIDNEY DISEASE) STAGE 2, GFR 60-89 ML/MIN: ICD-10-CM

## 2023-06-06 DIAGNOSIS — I10 PRIMARY HYPERTENSION: ICD-10-CM

## 2023-06-06 DIAGNOSIS — E11.8 TYPE 2 DIABETES MELLITUS WITH COMPLICATION, WITHOUT LONG-TERM CURRENT USE OF INSULIN (HCC): ICD-10-CM

## 2023-06-06 PROCEDURE — 99214 OFFICE O/P EST MOD 30 MIN: CPT | Mod: 95 | Performed by: INTERNAL MEDICINE

## 2023-06-06 ASSESSMENT — ENCOUNTER SYMPTOMS
SHORTNESS OF BREATH: 0
ABDOMINAL PAIN: 0
FEVER: 0

## 2023-06-06 ASSESSMENT — FIBROSIS 4 INDEX: FIB4 SCORE: 0.65

## 2023-06-06 NOTE — PROGRESS NOTES
"     Chief Complaint:   Chief Complaint   Patient presents with    Follow-Up     1 year follow up, [facility] labs from feb.        This evaluation was conducted via Zoom using secure and encrypted videoconferencing technology. The patient was in their home in the [facility].    The patient's identity was confirmed and verbal consent was obtained for this virtual visit.    CC: f/u CKD    History of Present Illness:  Ting Gregory is a 72 y.o. female with DM2, HTN, CKD3a who presents today for follow up.     Re: DM2, diagnosed 2008. Patient has not had microalbuminuria. Patient has seen an eye doctor and does not have retinopathy. Her sugars are \"doing good,\" A1c was 6.4%.  She remains on Jardiance and metformin, started on meds 2018.      Re: HTN, diagnosed 1991. BP control over the years has been well controlled \"most of the time.\" She checks BP at home and it's usually 110-120's / 80's. Remains on losartan and metoprolol. Denies recent vertigo since April, 2022. Denies light-headedness.     Re: CKD, denies history of kidney failure, denies history of GUILLE.  She used to take Aleve maybe 3 times a month, but stopped in 5/2021. She denies NSAIDs, takes Tylenol PRN. Denies bladder troubles.          ROS:    Review of Systems   Constitutional:  Negative for fever.   Respiratory:  Negative for shortness of breath.    Cardiovascular:  Negative for chest pain.   Gastrointestinal:  Negative for abdominal pain.   Genitourinary:  Negative for dysuria.   All other systems reviewed and are negative.                  Past Medical History:  Past Medical History:   Diagnosis Date    Anxiety     Asthma     Cataract     Depression     Diabetes mellitus type 2 in nonobese (HCC)     Diabetic neuropathy (HCC)     GERD (gastroesophageal reflux disease)     History of mammogram     fibrocystic breast disease    Hyperlipidemia     Hypertension     Irritable bowel syndrome     Kidney disease     Pelvic exam     ASCUS, then had " hysterectomy    S/P colonoscopy     2002    Vitamin D deficiency      Past Surgical History:   Procedure Laterality Date    ABDOMINAL HYSTERECTOMY TOTAL      APPENDECTOMY      SOLO BY LAPAROSCOPY      CYSTECTOMY      CYSTOSTOMY      in abdominal    DILATION AND CURETTAGE      x4    HYSTERECTOMY, TOTAL ABDOMINAL      took everything        Current Outpatient Medications   Medication Sig Dispense Refill    metoprolol tartrate (LOPRESSOR) 25 MG Tab TAKE ONE TABLET BY MOUTH TWICE A  Tablet 1    ezetimibe (ZETIA) 10 MG Tab Take 1 Tablet by mouth every day. 90 Tablet 3    JARDIANCE 25 MG Tab TAKE ONE TABLET BY MOUTH EVERY DAY 90 Tablet 3    buPROPion SR (WELLBUTRIN-SR) 150 MG TABLET SR 12 HR sustained-release tablet TAKE TWO TABLETS BY MOUTH EVERY  Tablet 3    metFORMIN ER (GLUCOPHAGE XR) 500 MG TABLET SR 24 HR Take 2 Tablets by mouth every day. 180 Tablet 3    pantoprazole (PROTONIX) 40 MG Tablet Delayed Response Take 1 Tablet by mouth every day. 90 Tablet 3    losartan (COZAAR) 50 MG Tab Take 1 Tablet by mouth every day. 90 Tablet 3    vitamin D3 (CHOLECALCIFEROL) 1000 Unit (25 mcg) Tab Take 1,000 Units by mouth every day.      aspirin EC (ECOTRIN) 81 MG Tablet Delayed Response Take 1 Tablet by mouth every day. 90 Tablet 3     No current facility-administered medications for this visit.           Allergies:   Allergies   Allergen Reactions    Ace Inhibitors Swelling    Atorvastatin     Augmentin      Severe rash    Demerol Rash     Rash      Fiorinal Rash     Rash      Minipress [Fd&C Blue #1-Fd&C Red #40-Prazosin] Unspecified     Cannot remember reaction    Prednisone Unspecified     Migraine, joint pain swelling    Statins [Hmg-Coa-R Inhibitors]      Severe cramps    Trilipix [Choline Fenofibrate]      Angioedema, hives    Vicodin [Hydrocodone-Acetaminophen] Rash     Rash          Current Outpatient Medications:   (Not in a hospital admission)           Physical Exam:   Vitals:   Vitals:    06/06/23  "0852   BP: 122/82   BP Location: Left arm   Patient Position: Sitting   BP Cuff Size: Adult   Pulse: (!) 52   Temp: 36.2 °C (97.2 °F)   SpO2: 99%   Weight: 63 kg (139 lb)   Height: 1.651 m (5' 5\")         Vitals obtained by patient:    Physical Exam:  Constitutional: Alert, no distress, well-groomed.  Skin: No rashes in visible areas.  Eye: Round. Conjunctiva clear, lids normal. No icterus.   ENMT: Lips pink without lesions, good dentition, moist mucous membranes. Phonation normal.  Neck: No masses, no thyromegaly. Moves freely without pain.  Respiratory: Unlabored respiratory effort, no cough or audible wheeze  Psych: Alert and oriented x3, normal affect and mood.       Imaging reviewed & Visualized by me:  DIAGNOSTIC IMAGING REVIEWED AND/OR INTERPRETED BY ME:         Laboratory:   Recent Labs     02/15/23  0749 02/15/23  0752   ALBUMIN 4.4 4.4   HDL  --  48   TRIGLYCERIDE  --  173*   SODIUM 141 141   POTASSIUM 4.0 4.1   CHLORIDE 105 105   CO2 24 24   BUN 22 22   CREATININE 0.94 0.95   PHOSPHORUS 4.0  --        Lab Results   Component Value Date/Time    WBC 6.7 02/15/2023 07:49 AM    WBC 6.8 01/24/2012 11:53 AM    RBC 4.86 02/15/2023 07:49 AM    RBC 4.73 01/24/2012 11:53 AM    HEMOGLOBIN 14.7 02/15/2023 07:49 AM    HEMATOCRIT 44.2 02/15/2023 07:49 AM    MCV 90.9 02/15/2023 07:49 AM    MCV 90 01/24/2012 11:53 AM    MCH 30.2 02/15/2023 07:49 AM    MCH 30.7 01/24/2012 11:53 AM    MCHC 33.3 (L) 02/15/2023 07:49 AM    MPV 9.3 02/15/2023 07:49 AM           Assessment and Plan:   Ting Gregory is a 72 y.o. female with DM2, HTN, CKD3a who presents today for follow up.    1. Stage 2-3a chronic kidney disease (HCC)  -Creatinine and GFR stable in CKD2 category.  Underlying CKD likely from diabetes, hypertension, and age-related kidney decline. I explained the importance of glycemic and blood pressure control to help slow CKD progression.  Avoid NSAIDs and other nephrotoxins.  Low-salt diet.  Continue losartan and " Jardiance for long-term kidney protection.    2. Type 2 diabetes mellitus with complication, without long-term current use of insulin (HCC)  -Well controlled per patient.  Continue SGLT2 inhibitor and losartan for long-term kidney protection.  Defer further management to primary care.  - There is no need to dose adjust metformin if GFR is above 45.  If GFR falls to between 30 and 45, maximum dose of metformin is 1000 mg daily.  If GFR falls to less than 30, metformin must be discontinued.     3. Essential hypertension  -Fairly well controlled.  Continue losartan 50 mg daily. If HTN worsens, would recommend increase losartan to 100mg first, then add diuretic such as HCTZ 25mg daily or lasix 40mg daily.          Discharge from nephrology clinic. Please refer back with future concerns.   PCP to monitor UA, UACR, UPCR moving forward      Pernell Ortiz MD  Nephrology

## 2023-06-06 NOTE — Clinical Note
She's doing great. I will discharge her from nephro clinic. Please monitor UA, microalbumin/creatinine ratio, and urine protein/creatinine ratio with your labs moving forward. Sincerely, Pernell Ortiz MD Nephrology [facility]

## 2023-07-10 ASSESSMENT — ENCOUNTER SYMPTOMS
SYNCOPE: 0
SHORTNESS OF BREATH: 0
FOCAL WEAKNESS: 0
COUGH: 0
VOMITING: 0
FEVER: 0
WHEEZING: 0
PND: 0
IRREGULAR HEARTBEAT: 0
PALPITATIONS: 0
NEAR-SYNCOPE: 0
NAUSEA: 0
DYSPNEA ON EXERTION: 0
DIZZINESS: 0
ORTHOPNEA: 0
WEAKNESS: 0
DIARRHEA: 0
NIGHT SWEATS: 0
ABDOMINAL PAIN: 0

## 2023-07-11 ENCOUNTER — OFFICE VISIT (OUTPATIENT)
Dept: CARDIOLOGY | Facility: MEDICAL CENTER | Age: 73
End: 2023-07-11
Attending: STUDENT IN AN ORGANIZED HEALTH CARE EDUCATION/TRAINING PROGRAM
Payer: COMMERCIAL

## 2023-07-11 VITALS
SYSTOLIC BLOOD PRESSURE: 118 MMHG | RESPIRATION RATE: 15 BRPM | HEART RATE: 63 BPM | OXYGEN SATURATION: 96 % | DIASTOLIC BLOOD PRESSURE: 70 MMHG | WEIGHT: 136 LBS | BODY MASS INDEX: 22.66 KG/M2 | HEIGHT: 65 IN

## 2023-07-11 DIAGNOSIS — I47.10 PAROXYSMAL SVT (SUPRAVENTRICULAR TACHYCARDIA) (HCC): ICD-10-CM

## 2023-07-11 DIAGNOSIS — Z78.9 STATIN INTOLERANCE: ICD-10-CM

## 2023-07-11 DIAGNOSIS — R55 NEAR SYNCOPE: ICD-10-CM

## 2023-07-11 DIAGNOSIS — E78.5 DYSLIPIDEMIA: ICD-10-CM

## 2023-07-11 DIAGNOSIS — I10 ESSENTIAL HYPERTENSION: ICD-10-CM

## 2023-07-11 PROCEDURE — 3078F DIAST BP <80 MM HG: CPT | Performed by: STUDENT IN AN ORGANIZED HEALTH CARE EDUCATION/TRAINING PROGRAM

## 2023-07-11 PROCEDURE — 99214 OFFICE O/P EST MOD 30 MIN: CPT | Performed by: STUDENT IN AN ORGANIZED HEALTH CARE EDUCATION/TRAINING PROGRAM

## 2023-07-11 PROCEDURE — 99212 OFFICE O/P EST SF 10 MIN: CPT | Performed by: STUDENT IN AN ORGANIZED HEALTH CARE EDUCATION/TRAINING PROGRAM

## 2023-07-11 PROCEDURE — 3074F SYST BP LT 130 MM HG: CPT | Performed by: STUDENT IN AN ORGANIZED HEALTH CARE EDUCATION/TRAINING PROGRAM

## 2023-07-11 ASSESSMENT — FIBROSIS 4 INDEX: FIB4 SCORE: 0.65

## 2023-07-11 NOTE — PROGRESS NOTES
Cardiology Follow-up Consultation Note    Date of note:  07/11/23  Primary Care Provider: OLINDA Stevens    Patient Name: Ting Gregory   YOB: 1950  MRN:              [id number]    Chief Complaint: Follow-up paroxysmal SVT    History of Present Illness: Ms. Ting Gregory is a 72 y.o. female whose current medical problems include paroxysmal SVT, dyslipidemia, hypertension, and DM who is here for follow-up paroxysmal SVT.    The patient was last seen in my clinic on 07/08/22.  The patient had carotid ultrasound and echocardiogram, not scheduled yet due to insurance reasons.  She wanted to hold off the studies for now as she is feeling quite well. No changes were made to her medications.     The patient returns today for follow-up. The patient reports feeling well today.  She had not had any more syncopal episodes for over a year now.  Her son has moved closer to live near her (he retired), and she has been active with helping him move.  She is also quite active otherwise, and walks daily.  She denies any chest pain or shortness of breath on exertion.  No orthopnea, PND, or leg swelling.  No palpitations.  No syncope or presyncopal episodes.    Cardiovascular Risk Factors:  1. Smoking status: Former smoker  2. Type II Diabetes Mellitus: On medication  Lab Results   Component Value Date/Time    HBA1C 6.6 (H) 02/15/2023 07:52 AM    HBA1C 6.3 (H) 08/11/2022 08:46 AM     3. Hypertension: On medication  4. Dyslipidemia: Diet controlled  Lab Results   Component Value Date/Time    CHOLSTRLTOT 245 (H) 02/15/2023 0752    TRIGLYCERIDE 173 (H) 02/15/2023 0752    HDL 48 02/15/2023 0752     (H) 02/15/2023 0752     5. Family history of early Coronary Artery Disease in a first degree relative (Male less than 55 years of age; Female less than 65 years of age): None  6.  Obesity and/or Metabolic Syndrome: BMI 22.8  7. Sedentary lifestyle: Active    Review of Systems   Constitutional:  Negative for fever, malaise/fatigue and night sweats.   Cardiovascular:  Negative for chest pain, dyspnea on exertion, irregular heartbeat, leg swelling, near-syncope, orthopnea, palpitations, paroxysmal nocturnal dyspnea and syncope.   Respiratory:  Negative for cough, shortness of breath and wheezing.    Gastrointestinal:  Negative for abdominal pain, diarrhea, nausea and vomiting.   Neurological:  Negative for dizziness, focal weakness and weakness.       All other systems reviewed and are negative.       Current Outpatient Medications   Medication Sig Dispense Refill    metoprolol tartrate (LOPRESSOR) 25 MG Tab TAKE ONE TABLET BY MOUTH TWICE A  Tablet 1    ezetimibe (ZETIA) 10 MG Tab Take 1 Tablet by mouth every day. 90 Tablet 3    JARDIANCE 25 MG Tab TAKE ONE TABLET BY MOUTH EVERY DAY 90 Tablet 3    buPROPion SR (WELLBUTRIN-SR) 150 MG TABLET SR 12 HR sustained-release tablet TAKE TWO TABLETS BY MOUTH EVERY  Tablet 3    metFORMIN ER (GLUCOPHAGE XR) 500 MG TABLET SR 24 HR Take 2 Tablets by mouth every day. 180 Tablet 3    pantoprazole (PROTONIX) 40 MG Tablet Delayed Response Take 1 Tablet by mouth every day. 90 Tablet 3    losartan (COZAAR) 50 MG Tab Take 1 Tablet by mouth every day. 90 Tablet 3    vitamin D3 (CHOLECALCIFEROL) 1000 Unit (25 mcg) Tab Take 1,000 Units by mouth every day.      aspirin EC (ECOTRIN) 81 MG Tablet Delayed Response Take 1 Tablet by mouth every day. 90 Tablet 3     No current facility-administered medications for this visit.         Allergies   Allergen Reactions    Ace Inhibitors Swelling    Atorvastatin     Augmentin      Severe rash    Demerol Rash     Rash      Fiorinal Rash     Rash      Minipress [Fd&C Blue #1-Fd&C Red #40-Prazosin] Unspecified     Cannot remember reaction    Prednisone Unspecified     Migraine, joint pain swelling    Statins [Hmg-Coa-R Inhibitors]      Severe cramps    Trilipix [Choline Fenofibrate]      Angioedema, hives    Vicodin  "[Hydrocodone-Acetaminophen] Rash     Rash           Physical Exam:  Ambulatory Vitals  /70 (BP Location: Left arm, Patient Position: Sitting, BP Cuff Size: Adult)   Pulse 63   Resp 15   Ht 1.651 m (5' 5\")   Wt 61.7 kg (136 lb)   SpO2 96%    Oxygen Therapy:  Pulse Oximetry: 96 %  BP Readings from Last 4 Encounters:   07/11/23 118/70   06/06/23 122/82   02/22/23 130/84   08/19/22 138/70       Weight/BMI: Body mass index is 22.63 kg/m².  Wt Readings from Last 4 Encounters:   07/11/23 61.7 kg (136 lb)   06/06/23 63 kg (139 lb)   02/22/23 62.1 kg (137 lb)   08/19/22 64 kg (141 lb)       General: Well appearing and in no apparent distress  HEENT: Normal  Neurological: No focal sensory deficits  Psychiatric: Appropriate affect, A/O x 3, intact judgement and insight      Lab Data Review:  Lab Results   Component Value Date/Time    CHOLSTRLTOT 245 (H) 02/15/2023 07:52 AM     (H) 02/15/2023 07:52 AM    HDL 48 02/15/2023 07:52 AM    TRIGLYCERIDE 173 (H) 02/15/2023 07:52 AM       Lab Results   Component Value Date/Time    SODIUM 141 02/15/2023 07:52 AM    POTASSIUM 4.1 02/15/2023 07:52 AM    CHLORIDE 105 02/15/2023 07:52 AM    CO2 24 02/15/2023 07:52 AM    GLUCOSE 116 (H) 02/15/2023 07:52 AM    BUN 22 02/15/2023 07:52 AM    CREATININE 0.95 02/15/2023 07:52 AM    CREATININE 0.75 01/24/2012 11:53 AM    BUNCREATRAT 24 01/24/2012 11:53 AM     Lab Results   Component Value Date/Time    ALKPHOSPHAT 78 02/15/2023 07:52 AM    ASTSGOT 14 02/15/2023 07:52 AM    ALTSGPT 20 02/15/2023 07:52 AM    TBILIRUBIN 0.5 02/15/2023 07:52 AM      Lab Results   Component Value Date/Time    WBC 6.7 02/15/2023 07:49 AM    WBC 6.8 01/24/2012 11:53 AM     Lab Results   Component Value Date/Time    HBA1C 6.6 (H) 02/15/2023 07:52 AM    HBA1C 6.3 (H) 08/11/2022 08:46 AM         Cardiac Imaging and Procedures Review:    EKG dated 10/11/2021: My personal interpretation is sinus rhythm, low voltage in frontal leads    No prior " echocardiogram    Event monitor 2/2022  48 Hour Holter Summary:   Sinus rhythm, ave HR 61 bpm, range  bpm.   No pauses.   Rare PVCs.   PACs present.   Brief atrial runs, up to 12 beats, up to 146 bpm, most c/w atrial   tachycardia.   No sustained rhythm changes.   No symptoms reported.      Assessment & Plan     1. Paroxysmal SVT (supraventricular tachycardia) (HCC)        2. Near syncope        3. Essential hypertension        4. Dyslipidemia        5. Statin intolerance              Shared Medical Decision Making:    Paroxysmal SVT  Near syncope, resolved  On telemetry, the patient had 4 runs of SVT and occasional PACs, otherwise normal, no pauses.  -Continue metoprolol 25 mg twice daily  -Counseled on lifestyle changes such as remaining well-hydrated and changing positions slowly/getting out of bed slowly.   -Patient has carotid ultrasound and echocardiogram, not scheduled yet due to insurance reasons.  She would like to hold off the studies for now as she is feeling quite well, which I think is reasonable    Hypertension  BP well controlled this visit  -Continue losartan 50mg daily   -Continue metoprolol as above    Dyslipidemia  Statin intolerance  The 10-year ASCVD risk score ([place] DK, et al., 2019) is: 23.4%  -Patient is allergic to statins.   -Continue aspirin 81mg daily  -Continue ezetimibe 10mg daily (repeat labs will be checked by PCP in a few months according to the patient)  -Counseled on healthy diet    All of the patient's excellent questions were answered to the best of my knowledge and to her satisfaction.  It was a pleasure seeing Ms. Ting Gregory in my clinic today. Return in about 1 year (around 7/11/2024), or if symptoms worsen or fail to improve. Patient is aware to call the cardiology clinic with any questions or concerns.      Paloma Finley MD  [facility]  [facility], [address]  [address]  [address]  Phone: [phone]  Fax: [phone]

## 2023-08-07 NOTE — TELEPHONE ENCOUNTER
Received request via: Pharmacy    Was the patient seen in the last year in this department? Yes    Does the patient have an active prescription (recently filled or refills available) for medication(s) requested? No    Does the patient have [facility] and need 100 day supply (blood pressure, diabetes and cholesterol meds only)? Patient does not have SCP      Last Office Visit:02/22/2023  Last labs:02/15/2023

## 2023-08-08 RX ORDER — PANTOPRAZOLE SODIUM 40 MG/1
40 TABLET, DELAYED RELEASE ORAL
Qty: 90 TABLET | Refills: 3 | Status: SHIPPED | OUTPATIENT
Start: 2023-08-08

## 2023-08-11 ENCOUNTER — HOSPITAL ENCOUNTER (OUTPATIENT)
Dept: LAB | Facility: MEDICAL CENTER | Age: 73
End: 2023-08-11
Attending: FAMILY MEDICINE
Payer: COMMERCIAL

## 2023-08-11 DIAGNOSIS — N18.31 STAGE 3A CHRONIC KIDNEY DISEASE: ICD-10-CM

## 2023-08-11 DIAGNOSIS — E11.8 TYPE 2 DIABETES MELLITUS WITH COMPLICATION, WITHOUT LONG-TERM CURRENT USE OF INSULIN (HCC): ICD-10-CM

## 2023-08-11 DIAGNOSIS — E78.2 MIXED HYPERLIPIDEMIA: ICD-10-CM

## 2023-08-11 DIAGNOSIS — I10 ESSENTIAL HYPERTENSION: ICD-10-CM

## 2023-08-11 LAB
ALBUMIN SERPL BCP-MCNC: 4.4 G/DL (ref 3.2–4.9)
BUN SERPL-MCNC: 23 MG/DL (ref 8–22)
CALCIUM ALBUM COR SERPL-MCNC: 9.1 MG/DL (ref 8.5–10.5)
CALCIUM SERPL-MCNC: 9.4 MG/DL (ref 8.5–10.5)
CHLORIDE SERPL-SCNC: 104 MMOL/L (ref 96–112)
CHOLEST SERPL-MCNC: 192 MG/DL (ref 100–199)
CO2 SERPL-SCNC: 25 MMOL/L (ref 20–33)
CREAT SERPL-MCNC: 1.03 MG/DL (ref 0.5–1.4)
EST. AVERAGE GLUCOSE BLD GHB EST-MCNC: 134 MG/DL
FASTING STATUS PATIENT QL REPORTED: NORMAL
GFR SERPLBLD CREATININE-BSD FMLA CKD-EPI: 58 ML/MIN/1.73 M 2
GLUCOSE SERPL-MCNC: 122 MG/DL (ref 65–99)
HBA1C MFR BLD: 6.3 % (ref 4–5.6)
HDLC SERPL-MCNC: 53 MG/DL
LDLC SERPL CALC-MCNC: 105 MG/DL
PHOSPHATE SERPL-MCNC: 4.4 MG/DL (ref 2.5–4.5)
POTASSIUM SERPL-SCNC: 4.5 MMOL/L (ref 3.6–5.5)
SODIUM SERPL-SCNC: 140 MMOL/L (ref 135–145)
TRIGL SERPL-MCNC: 169 MG/DL (ref 0–149)

## 2023-08-11 PROCEDURE — 36415 COLL VENOUS BLD VENIPUNCTURE: CPT

## 2023-08-11 PROCEDURE — 83036 HEMOGLOBIN GLYCOSYLATED A1C: CPT

## 2023-08-11 PROCEDURE — 80061 LIPID PANEL: CPT

## 2023-08-11 PROCEDURE — 80069 RENAL FUNCTION PANEL: CPT

## 2023-08-11 RX ORDER — LOSARTAN POTASSIUM 50 MG/1
50 TABLET ORAL DAILY
Qty: 90 TABLET | Refills: 3 | Status: SHIPPED | OUTPATIENT
Start: 2023-08-11

## 2023-08-11 NOTE — TELEPHONE ENCOUNTER
Received request via: Pharmacy    Was the patient seen in the last year in this department? Yes    Does the patient have an active prescription (recently filled or refills available) for medication(s) requested? No    Does the patient have [facility] and need 100 day supply (blood pressure, diabetes and cholesterol meds only)? Patient does not have SCP  07/11/23

## 2023-08-15 RX ORDER — METFORMIN HYDROCHLORIDE 500 MG/1
1000 TABLET, EXTENDED RELEASE ORAL
Qty: 180 TABLET | Refills: 3 | Status: SHIPPED | OUTPATIENT
Start: 2023-08-15

## 2023-08-15 SDOH — HEALTH STABILITY: PHYSICAL HEALTH: ON AVERAGE, HOW MANY MINUTES DO YOU ENGAGE IN EXERCISE AT THIS LEVEL?: 20 MIN

## 2023-08-15 SDOH — ECONOMIC STABILITY: FOOD INSECURITY: WITHIN THE PAST 12 MONTHS, YOU WORRIED THAT YOUR FOOD WOULD RUN OUT BEFORE YOU GOT MONEY TO BUY MORE.: NEVER TRUE

## 2023-08-15 SDOH — ECONOMIC STABILITY: HOUSING INSECURITY
IN THE LAST 12 MONTHS, WAS THERE A TIME WHEN YOU DID NOT HAVE A STEADY PLACE TO SLEEP OR SLEPT IN A SHELTER (INCLUDING NOW)?: NO

## 2023-08-15 SDOH — ECONOMIC STABILITY: INCOME INSECURITY: HOW HARD IS IT FOR YOU TO PAY FOR THE VERY BASICS LIKE FOOD, HOUSING, MEDICAL CARE, AND HEATING?: NOT VERY HARD

## 2023-08-15 SDOH — ECONOMIC STABILITY: FOOD INSECURITY: WITHIN THE PAST 12 MONTHS, THE FOOD YOU BOUGHT JUST DIDN'T LAST AND YOU DIDN'T HAVE MONEY TO GET MORE.: NEVER TRUE

## 2023-08-15 SDOH — ECONOMIC STABILITY: INCOME INSECURITY: IN THE LAST 12 MONTHS, WAS THERE A TIME WHEN YOU WERE NOT ABLE TO PAY THE MORTGAGE OR RENT ON TIME?: NO

## 2023-08-15 SDOH — ECONOMIC STABILITY: HOUSING INSECURITY: IN THE LAST 12 MONTHS, HOW MANY PLACES HAVE YOU LIVED?: 1

## 2023-08-15 SDOH — HEALTH STABILITY: PHYSICAL HEALTH: ON AVERAGE, HOW MANY DAYS PER WEEK DO YOU ENGAGE IN MODERATE TO STRENUOUS EXERCISE (LIKE A BRISK WALK)?: 7 DAYS

## 2023-08-15 SDOH — ECONOMIC STABILITY: TRANSPORTATION INSECURITY
IN THE PAST 12 MONTHS, HAS THE LACK OF TRANSPORTATION KEPT YOU FROM MEDICAL APPOINTMENTS OR FROM GETTING MEDICATIONS?: NO

## 2023-08-15 SDOH — HEALTH STABILITY: MENTAL HEALTH
STRESS IS WHEN SOMEONE FEELS TENSE, NERVOUS, ANXIOUS, OR CAN'T SLEEP AT NIGHT BECAUSE THEIR MIND IS TROUBLED. HOW STRESSED ARE YOU?: VERY MUCH

## 2023-08-15 SDOH — ECONOMIC STABILITY: TRANSPORTATION INSECURITY
IN THE PAST 12 MONTHS, HAS LACK OF RELIABLE TRANSPORTATION KEPT YOU FROM MEDICAL APPOINTMENTS, MEETINGS, WORK OR FROM GETTING THINGS NEEDED FOR DAILY LIVING?: NO

## 2023-08-15 SDOH — ECONOMIC STABILITY: TRANSPORTATION INSECURITY
IN THE PAST 12 MONTHS, HAS LACK OF TRANSPORTATION KEPT YOU FROM MEETINGS, WORK, OR FROM GETTING THINGS NEEDED FOR DAILY LIVING?: NO

## 2023-08-15 ASSESSMENT — LIFESTYLE VARIABLES
HOW MANY STANDARD DRINKS CONTAINING ALCOHOL DO YOU HAVE ON A TYPICAL DAY: PATIENT DOES NOT DRINK
SKIP TO QUESTIONS 9-10: 1
HOW OFTEN DO YOU HAVE SIX OR MORE DRINKS ON ONE OCCASION: NEVER
HOW OFTEN DO YOU HAVE A DRINK CONTAINING ALCOHOL: NEVER
AUDIT-C TOTAL SCORE: 0

## 2023-08-15 ASSESSMENT — SOCIAL DETERMINANTS OF HEALTH (SDOH)
HOW OFTEN DO YOU ATTENT MEETINGS OF THE CLUB OR ORGANIZATION YOU BELONG TO?: NEVER
HOW OFTEN DO YOU ATTEND CHURCH OR RELIGIOUS SERVICES?: NEVER
HOW HARD IS IT FOR YOU TO PAY FOR THE VERY BASICS LIKE FOOD, HOUSING, MEDICAL CARE, AND HEATING?: NOT VERY HARD
IN A TYPICAL WEEK, HOW MANY TIMES DO YOU TALK ON THE PHONE WITH FAMILY, FRIENDS, OR NEIGHBORS?: MORE THAN THREE TIMES A WEEK
IN A TYPICAL WEEK, HOW MANY TIMES DO YOU TALK ON THE PHONE WITH FAMILY, FRIENDS, OR NEIGHBORS?: MORE THAN THREE TIMES A WEEK
HOW OFTEN DO YOU GET TOGETHER WITH FRIENDS OR RELATIVES?: TWICE A WEEK
HOW MANY DRINKS CONTAINING ALCOHOL DO YOU HAVE ON A TYPICAL DAY WHEN YOU ARE DRINKING: PATIENT DOES NOT DRINK
HOW OFTEN DO YOU HAVE A DRINK CONTAINING ALCOHOL: NEVER
WITHIN THE PAST 12 MONTHS, YOU WORRIED THAT YOUR FOOD WOULD RUN OUT BEFORE YOU GOT THE MONEY TO BUY MORE: NEVER TRUE
HOW OFTEN DO YOU HAVE SIX OR MORE DRINKS ON ONE OCCASION: NEVER
HOW OFTEN DO YOU GET TOGETHER WITH FRIENDS OR RELATIVES?: TWICE A WEEK
DO YOU BELONG TO ANY CLUBS OR ORGANIZATIONS SUCH AS CHURCH GROUPS UNIONS, FRATERNAL OR ATHLETIC GROUPS, OR SCHOOL GROUPS?: YES
HOW OFTEN DO YOU ATTEND CHURCH OR RELIGIOUS SERVICES?: NEVER
HOW OFTEN DO YOU ATTENT MEETINGS OF THE CLUB OR ORGANIZATION YOU BELONG TO?: NEVER
DO YOU BELONG TO ANY CLUBS OR ORGANIZATIONS SUCH AS CHURCH GROUPS UNIONS, FRATERNAL OR ATHLETIC GROUPS, OR SCHOOL GROUPS?: YES

## 2023-08-18 ENCOUNTER — OFFICE VISIT (OUTPATIENT)
Dept: MEDICAL GROUP | Facility: PHYSICIAN GROUP | Age: 73
End: 2023-08-18
Payer: COMMERCIAL

## 2023-08-18 VITALS
SYSTOLIC BLOOD PRESSURE: 118 MMHG | DIASTOLIC BLOOD PRESSURE: 84 MMHG | HEART RATE: 66 BPM | WEIGHT: 134 LBS | OXYGEN SATURATION: 99 % | HEIGHT: 65 IN | RESPIRATION RATE: 16 BRPM | BODY MASS INDEX: 22.33 KG/M2 | TEMPERATURE: 97.5 F

## 2023-08-18 DIAGNOSIS — N18.2 CKD (CHRONIC KIDNEY DISEASE) STAGE 2, GFR 60-89 ML/MIN: ICD-10-CM

## 2023-08-18 DIAGNOSIS — N18.31 STAGE 3A CHRONIC KIDNEY DISEASE: ICD-10-CM

## 2023-08-18 DIAGNOSIS — E11.8 TYPE 2 DIABETES MELLITUS WITH COMPLICATION, WITHOUT LONG-TERM CURRENT USE OF INSULIN (HCC): ICD-10-CM

## 2023-08-18 PROBLEM — R63.4 UNINTENTIONAL WEIGHT LOSS: Status: RESOLVED | Noted: 2021-08-16 | Resolved: 2023-08-18

## 2023-08-18 PROBLEM — R42 DIZZINESS: Status: RESOLVED | Noted: 2022-02-15 | Resolved: 2023-08-18

## 2023-08-18 PROBLEM — B35.1 ONYCHOMYCOSIS: Status: RESOLVED | Noted: 2022-08-19 | Resolved: 2023-08-18

## 2023-08-18 PROCEDURE — 3074F SYST BP LT 130 MM HG: CPT | Performed by: FAMILY MEDICINE

## 2023-08-18 PROCEDURE — 99214 OFFICE O/P EST MOD 30 MIN: CPT | Performed by: FAMILY MEDICINE

## 2023-08-18 PROCEDURE — 92250 FUNDUS PHOTOGRAPHY W/I&R: CPT | Mod: TC | Performed by: FAMILY MEDICINE

## 2023-08-18 PROCEDURE — 3079F DIAST BP 80-89 MM HG: CPT | Performed by: FAMILY MEDICINE

## 2023-08-18 ASSESSMENT — FIBROSIS 4 INDEX: FIB4 SCORE: 0.66

## 2023-08-18 NOTE — ASSESSMENT & PLAN NOTE
Slight decrease to 50  Recheck labs in 2-3 months  Continue 64 oz water daily, avoids NSAIDS  DM2 is well controlled

## 2023-08-18 NOTE — ASSESSMENT & PLAN NOTE
Improving  A1c:   Lab Results   Component Value Date/Time    HBA1C 6.3 (H) 08/11/2023 0750    AVGLUC 134 08/11/2023 0750     Lipids:   Lab Results   Component Value Date/Time    CHOLSTRLTOT 192 08/11/2023 07:50 AM    TRIGLYCERIDE 169 (H) 08/11/2023 07:50 AM    HDL 53 08/11/2023 07:50 AM     (H) 08/11/2023 07:50 AM   ]  BMP:   Lab Results   Component Value Date/Time    SODIUM 140 08/11/2023 0750    POTASSIUM 4.5 08/11/2023 0750    CHLORIDE 104 08/11/2023 0750    CO2 25 08/11/2023 0750    GLUCOSE 122 (H) 08/11/2023 0750    BUN 23 (H) 08/11/2023 0750    CREATININE 1.03 08/11/2023 0750    CALCIUM 9.4 08/11/2023 0750    ANION 12.0 02/15/2023 0752     GFR:   Lab Results   Component Value Date/Time    IF[place] >60 08/30/2021 0702    IFNOTAFR 55 (A) 08/30/2021 0702     Last eye exam: done in clinic today  Foot exam: decreased sensation to monofilament testing b/l, no ulcers or deformities  Medications: metformin, zetia, losartan, asa

## 2023-08-18 NOTE — PROGRESS NOTES
Subjective:   Ting Gregory is a 73 y.o. female here today for evaluation and management of:     Type 2 diabetes mellitus with complication, without long-term current use of insulin (HCC)  Improving  A1c:   Lab Results   Component Value Date/Time    HBA1C 6.3 (H) 08/11/2023 0750    AVGLUC 134 08/11/2023 0750     Lipids:   Lab Results   Component Value Date/Time    CHOLSTRLTOT 192 08/11/2023 07:50 AM    TRIGLYCERIDE 169 (H) 08/11/2023 07:50 AM    HDL 53 08/11/2023 07:50 AM     (H) 08/11/2023 07:50 AM   ]  BMP:   Lab Results   Component Value Date/Time    SODIUM 140 08/11/2023 0750    POTASSIUM 4.5 08/11/2023 0750    CHLORIDE 104 08/11/2023 0750    CO2 25 08/11/2023 0750    GLUCOSE 122 (H) 08/11/2023 0750    BUN 23 (H) 08/11/2023 0750    CREATININE 1.03 08/11/2023 0750    CALCIUM 9.4 08/11/2023 0750    ANION 12.0 02/15/2023 0752     GFR:   Lab Results   Component Value Date/Time    IFAFR[place] >60 08/30/2021 0702    IFNOTAFR 55 (A) 08/30/2021 0702     Last eye exam: done in clinic today  Foot exam: decreased sensation to monofilament testing b/l, no ulcers or deformities  Medications: metformin, zetia, losartan, asa      CKD (chronic kidney disease) stage 2, GFR 60-89 ml/min  Slight decrease to 50  Recheck labs in 2-3 months  Continue 64 oz water daily, avoids NSAIDS  DM2 is well controlled             Current medicines (including changes today)  Current Outpatient Medications   Medication Sig Dispense Refill    metFORMIN ER (GLUCOPHAGE XR) 500 MG TABLET SR 24 HR Take 2 Tablets by mouth every day. 180 Tablet 3    losartan (COZAAR) 50 MG Tab Take 1 Tablet by mouth every day. 90 Tablet 3    pantoprazole (PROTONIX) 40 MG Tablet Delayed Response TAKE ONE TABLET BY MOUTH EVERY DAY 90 Tablet 3    metoprolol tartrate (LOPRESSOR) 25 MG Tab TAKE ONE TABLET BY MOUTH TWICE A  Tablet 1    ezetimibe (ZETIA) 10 MG Tab Take 1 Tablet by mouth every day. 90 Tablet 3    JARDIANCE 25 MG Tab TAKE ONE TABLET BY MOUTH  "EVERY DAY 90 Tablet 3    buPROPion SR (WELLBUTRIN-SR) 150 MG TABLET SR 12 HR sustained-release tablet TAKE TWO TABLETS BY MOUTH EVERY  Tablet 3    vitamin D3 (CHOLECALCIFEROL) 1000 Unit (25 mcg) Tab Take 1,000 Units by mouth every day.      aspirin EC (ECOTRIN) 81 MG Tablet Delayed Response Take 1 Tablet by mouth every day. 90 Tablet 3     No current facility-administered medications for this visit.     She  has a past medical history of Anxiety, Asthma, Cataract, Depression, Diabetes mellitus type 2 in nonobese ([facility]), Diabetic neuropathy ([facility]), GERD (gastroesophageal reflux disease), History of mammogram, Hyperlipidemia, Hypertension, Irritable bowel syndrome, Kidney disease, Pelvic exam, S/P colonoscopy, and Vitamin D deficiency.    She has no past medical history of Anemia, Arrhythmia, Arthritis, Blood transfusion without reported diagnosis, Breast cancer ([facility]), Cancer ([facility]), Clotting disorder ([facility]), COPD (chronic obstructive pulmonary disease) ([facility]), Encounter for long-term (current) use of other medications, Glaucoma, Heart attack ([facility]), Heart murmur, HIV (human immunodeficiency virus infection) ([facility]), Migraine, Seizure ([facility]), Stroke ([facility]), Substance abuse ([facility]), or Thyroid disease.    ROS  No chest pain, no shortness of breath, no abdominal pain       Objective:     /84   Pulse 66   Temp 36.4 °C (97.5 °F) (Temporal)   Resp 16   Ht 1.651 m (5' 5\")   Wt 60.8 kg (134 lb)   SpO2 99%  Body mass index is 22.3 kg/m².   Physical Exam:  Constitutional: Alert, no distress.  Skin: Warm, dry, good turgor, no rashes in visible areas.  Eye: Equal, round and reactive, conjunctiva clear, lids normal.  ENMT: Lips without lesions, good dentition, oropharynx clear.  Neck: Trachea midline, no masses, no thyromegaly. No cervical or supraclavicular lymphadenopathy  Respiratory: Unlabored respiratory effort, lungs clear to auscultation, no wheezes, no ronchi.  Cardiovascular: Normal S1, S2, no murmur, no " edema.  Abdomen: Soft, non-tender, no masses, no hepatosplenomegaly.  Psych: Alert and oriented x3, normal affect and mood.        Assessment and Plan:   The following treatment plan was discussed    1. Type 2 diabetes mellitus with complication, without long-term current use of insulin (HCC)  - POCT Retinal Eye Exam    2. Stage 3a chronic kidney disease (HCC)  - Renal Function Panel; Future  - MICROALBUMIN CREAT RATIO URINE; Future  - PROTEIN/CREAT RATIO URINE; Future    3. CKD (chronic kidney disease) stage 2, GFR 60-89 ml/min      Followup: No follow-ups on file.

## 2023-08-25 LAB — RETINAL SCREEN: NEGATIVE

## 2023-09-13 DIAGNOSIS — I49.1 PREMATURE ATRIAL COMPLEXES: ICD-10-CM

## 2023-09-13 DIAGNOSIS — I10 ESSENTIAL HYPERTENSION: ICD-10-CM

## 2023-09-13 DIAGNOSIS — I47.10 PAROXYSMAL SVT (SUPRAVENTRICULAR TACHYCARDIA) (HCC): ICD-10-CM

## 2023-09-13 NOTE — TELEPHONE ENCOUNTER
Is the patient due for a refill? Yes    Was the patient seen the past year? Yes    Date of last office visit: 7/11/2023    Does the patient have an upcoming appointment?  No   If yes, When?     Provider to refill:HK    Does the patients insurance require a 100 day supply?  No

## 2023-09-25 DIAGNOSIS — E11.8 TYPE 2 DIABETES MELLITUS WITH COMPLICATION, WITHOUT LONG-TERM CURRENT USE OF INSULIN (HCC): ICD-10-CM

## 2023-11-03 DIAGNOSIS — F33.1 MAJOR DEPRESSIVE DISORDER, RECURRENT EPISODE, MODERATE (HCC): ICD-10-CM

## 2023-11-06 NOTE — TELEPHONE ENCOUNTER
Requested Prescriptions     Pending Prescriptions Disp Refills    buPROPion SR (WELLBUTRIN-SR) 150 MG TABLET SR 12 HR sustained-release tablet [Pharmacy Med Name: BUPROPION HCL ER (SR) 150MG TB12] 180 Tablet 3     Sig: TAKE TWO TABLETS BY MOUTH EVERY DAY      Last office visit: 8/18/23  Last lab: 8/11/23

## 2023-11-07 RX ORDER — BUPROPION HYDROCHLORIDE 150 MG/1
TABLET, EXTENDED RELEASE ORAL
Qty: 180 TABLET | Refills: 3 | Status: SHIPPED | OUTPATIENT
Start: 2023-11-07

## 2023-11-21 ENCOUNTER — HOSPITAL ENCOUNTER (OUTPATIENT)
Dept: LAB | Facility: MEDICAL CENTER | Age: 73
End: 2023-11-21
Attending: FAMILY MEDICINE
Payer: COMMERCIAL

## 2023-11-21 DIAGNOSIS — E11.8 TYPE 2 DIABETES MELLITUS WITH COMPLICATION, WITHOUT LONG-TERM CURRENT USE OF INSULIN (HCC): ICD-10-CM

## 2023-11-21 DIAGNOSIS — N18.31 STAGE 3A CHRONIC KIDNEY DISEASE: ICD-10-CM

## 2023-11-21 LAB
ALBUMIN SERPL BCP-MCNC: 4.3 G/DL (ref 3.2–4.9)
BUN SERPL-MCNC: 20 MG/DL (ref 8–22)
CALCIUM ALBUM COR SERPL-MCNC: 9.1 MG/DL (ref 8.5–10.5)
CALCIUM SERPL-MCNC: 9.3 MG/DL (ref 8.5–10.5)
CHLORIDE SERPL-SCNC: 102 MMOL/L (ref 96–112)
CO2 SERPL-SCNC: 26 MMOL/L (ref 20–33)
CREAT SERPL-MCNC: 1.02 MG/DL (ref 0.5–1.4)
CREAT UR-MCNC: 86.72 MG/DL
CREAT UR-MCNC: 88.16 MG/DL
EST. AVERAGE GLUCOSE BLD GHB EST-MCNC: 134 MG/DL
GFR SERPLBLD CREATININE-BSD FMLA CKD-EPI: 58 ML/MIN/1.73 M 2
GLUCOSE SERPL-MCNC: 122 MG/DL (ref 65–99)
HBA1C MFR BLD: 6.3 % (ref 4–5.6)
MICROALBUMIN UR-MCNC: <1.2 MG/DL
MICROALBUMIN/CREAT UR: NORMAL MG/G (ref 0–30)
PHOSPHATE SERPL-MCNC: 4 MG/DL (ref 2.5–4.5)
POTASSIUM SERPL-SCNC: 4.6 MMOL/L (ref 3.6–5.5)
PROT UR-MCNC: 8 MG/DL (ref 0–15)
PROT/CREAT UR: 92 MG/G (ref 10–107)
SODIUM SERPL-SCNC: 137 MMOL/L (ref 135–145)

## 2023-11-21 PROCEDURE — 82043 UR ALBUMIN QUANTITATIVE: CPT

## 2023-11-21 PROCEDURE — 84156 ASSAY OF PROTEIN URINE: CPT

## 2023-11-21 PROCEDURE — 80069 RENAL FUNCTION PANEL: CPT

## 2023-11-21 PROCEDURE — 83036 HEMOGLOBIN GLYCOSYLATED A1C: CPT

## 2023-11-21 PROCEDURE — 82570 ASSAY OF URINE CREATININE: CPT

## 2023-11-21 PROCEDURE — 36415 COLL VENOUS BLD VENIPUNCTURE: CPT

## 2023-11-30 ENCOUNTER — OFFICE VISIT (OUTPATIENT)
Dept: MEDICAL GROUP | Facility: PHYSICIAN GROUP | Age: 73
End: 2023-11-30
Payer: COMMERCIAL

## 2023-11-30 VITALS
SYSTOLIC BLOOD PRESSURE: 120 MMHG | RESPIRATION RATE: 18 BRPM | WEIGHT: 138 LBS | BODY MASS INDEX: 22.99 KG/M2 | HEART RATE: 70 BPM | OXYGEN SATURATION: 97 % | TEMPERATURE: 97.8 F | HEIGHT: 65 IN | DIASTOLIC BLOOD PRESSURE: 78 MMHG

## 2023-11-30 DIAGNOSIS — N18.31 STAGE 3A CHRONIC KIDNEY DISEASE: ICD-10-CM

## 2023-11-30 DIAGNOSIS — E11.8 TYPE 2 DIABETES MELLITUS WITH COMPLICATION, WITHOUT LONG-TERM CURRENT USE OF INSULIN (HCC): ICD-10-CM

## 2023-11-30 DIAGNOSIS — G62.9 PERIPHERAL POLYNEUROPATHY: ICD-10-CM

## 2023-11-30 DIAGNOSIS — H40.009 PREGLAUCOMA, UNSPECIFIED LATERALITY: ICD-10-CM

## 2023-11-30 PROBLEM — R55 NEAR SYNCOPE: Status: RESOLVED | Noted: 2021-08-16 | Resolved: 2023-11-30

## 2023-11-30 PROCEDURE — 3078F DIAST BP <80 MM HG: CPT | Performed by: FAMILY MEDICINE

## 2023-11-30 PROCEDURE — 99214 OFFICE O/P EST MOD 30 MIN: CPT | Performed by: FAMILY MEDICINE

## 2023-11-30 PROCEDURE — 3074F SYST BP LT 130 MM HG: CPT | Performed by: FAMILY MEDICINE

## 2023-11-30 ASSESSMENT — FIBROSIS 4 INDEX: FIB4 SCORE: 0.66

## 2023-11-30 NOTE — PROGRESS NOTES
Subjective:   Ting Gregory is a 73 y.o. female here today for evaluation and management of:     Type 2 diabetes mellitus with complication, without long-term current use of insulin (HCC)  Well controlled  A1c:   Lab Results   Component Value Date/Time    HBA1C 6.3 (H) 11/21/2023 0805    AVGLUC 134 11/21/2023 0805     Lipids:   Lab Results   Component Value Date/Time    CHOLSTRLTOT 192 08/11/2023 07:50 AM    TRIGLYCERIDE 169 (H) 08/11/2023 07:50 AM    HDL 53 08/11/2023 07:50 AM     (H) 08/11/2023 07:50 AM   ]  BMP:   Lab Results   Component Value Date/Time    SODIUM 137 11/21/2023 0805    POTASSIUM 4.6 11/21/2023 0805    CHLORIDE 102 11/21/2023 0805    CO2 26 11/21/2023 0805    GLUCOSE 122 (H) 11/21/2023 0805    BUN 20 11/21/2023 0805    CREATININE 1.02 11/21/2023 0805    CALCIUM 9.3 11/21/2023 0805    ANION 12.0 02/15/2023 0752     GFR:   Lab Results   Component Value Date/Time    IF[place] >60 08/30/2021 0702    IFNOTAFR 55 (A) 08/30/2021 0702     Last eye exam: up to date  Foot exam: slightly decreased sensation to monofilament testing, no ulcers.   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 Stable.     Peripheral neuropathy  No acute changes.              Current medicines (including changes today)  Current Outpatient Medications   Medication Sig Dispense Refill    buPROPion SR (WELLBUTRIN-SR) 150 MG TABLET SR 12 HR sustained-release tablet TAKE TWO TABLETS BY MOUTH EVERY  Tablet 3    aspirin EC (ECOTRIN) 81 MG Tablet Delayed Response Take 1 Tablet by mouth every day. 90 Tablet 3    metoprolol tartrate (LOPRESSOR) 25 MG Tab TAKE ONE TABLET BY MOUTH TWICE A  Tablet 3    metFORMIN ER (GLUCOPHAGE XR) 500 MG TABLET SR 24 HR Take 2 Tablets by mouth every day. 180 Tablet 3    losartan (COZAAR) 50 MG Tab Take 1 Tablet by mouth every day. 90 Tablet 3    pantoprazole (PROTONIX) 40 MG Tablet Delayed Response TAKE ONE TABLET BY MOUTH EVERY  "DAY 90 Tablet 3    ezetimibe (ZETIA) 10 MG Tab Take 1 Tablet by mouth every day. 90 Tablet 3    JARDIANCE 25 MG Tab TAKE ONE TABLET BY MOUTH EVERY DAY 90 Tablet 3    vitamin D3 (CHOLECALCIFEROL) 1000 Unit (25 mcg) Tab Take 1,000 Units by mouth every day.       No current facility-administered medications for this visit.     She  has a past medical history of Anxiety, Asthma, Cataract, Depression, Diabetes mellitus type 2 in nonobese ([facility]), Diabetic neuropathy ([facility]), GERD (gastroesophageal reflux disease), History of mammogram, Hyperlipidemia, Hypertension, Irritable bowel syndrome, Kidney disease, Pelvic exam, S/P colonoscopy, and Vitamin D deficiency.    She has no past medical history of Anemia, Arrhythmia, Arthritis, Blood transfusion without reported diagnosis, Breast cancer ([facility]), Cancer ([facility]), Clotting disorder ([facility]), COPD (chronic obstructive pulmonary disease) ([facility]), Encounter for long-term (current) use of other medications, Glaucoma, Heart attack ([facility]), Heart murmur, HIV (human immunodeficiency virus infection) ([facility]), Migraine, Seizure ([facility]), Stroke ([facility]), Substance abuse ([facility]), or Thyroid disease.    ROS  No chest pain, no shortness of breath, no abdominal pain       Objective:     /78   Pulse 70   Temp 36.6 °C (97.8 °F) (Temporal)   Resp 18   Ht 1.651 m (5' 5\")   Wt 62.6 kg (138 lb)   SpO2 97%  Body mass index is 22.96 kg/m².   Physical Exam:  Constitutional: Alert, no distress, well-groomed.  Skin: No rashes in visible areas.  Eye: Round. Conjunctiva clear, lids normal. No icterus.   ENMT: Lips pink without lesions, good dentition, moist mucous membranes. Phonation normal.  Neck: No masses, no thyromegaly. Moves freely without pain.  Respiratory: Unlabored respiratory effort, no cough or audible wheeze  Psych: Alert and oriented x3, normal affect and mood.          Assessment and Plan:   The following treatment plan was discussed    1. Type 2 diabetes mellitus with complication, without " long-term current use of insulin (HCC)  - Diabetic Monofilament LE Exam  - HEMOGLOBIN A1C; Future    2. Preglaucoma, unspecified laterality    3. Peripheral polyneuropathy    4. Stage 3a chronic kidney disease (HCC)  - Renal Function Panel; Future      Followup: No follow-ups on file.

## 2023-11-30 NOTE — ASSESSMENT & PLAN NOTE
Well controlled  A1c:   Lab Results   Component Value Date/Time    HBA1C 6.3 (H) 11/21/2023 0805    AVGLUC 134 11/21/2023 0805     Lipids:   Lab Results   Component Value Date/Time    CHOLSTRLTOT 192 08/11/2023 07:50 AM    TRIGLYCERIDE 169 (H) 08/11/2023 07:50 AM    HDL 53 08/11/2023 07:50 AM     (H) 08/11/2023 07:50 AM   ]  BMP:   Lab Results   Component Value Date/Time    SODIUM 137 11/21/2023 0805    POTASSIUM 4.6 11/21/2023 0805    CHLORIDE 102 11/21/2023 0805    CO2 26 11/21/2023 0805    GLUCOSE 122 (H) 11/21/2023 0805    BUN 20 11/21/2023 0805    CREATININE 1.02 11/21/2023 0805    CALCIUM 9.3 11/21/2023 0805    ANION 12.0 02/15/2023 0752     GFR:   Lab Results   Component Value Date/Time    IFA[place] >60 08/30/2021 0702    IFNOTAFR 55 (A) 08/30/2021 0702     Last eye exam: up to date  Foot exam: slightly decreased sensation to monofilament testing, no ulcers.   Medications: met, arb, jardiance, asa, zetia

## 2023-12-06 DIAGNOSIS — E11.8 TYPE 2 DIABETES MELLITUS WITH COMPLICATION, WITHOUT LONG-TERM CURRENT USE OF INSULIN (HCC): ICD-10-CM

## 2023-12-06 RX ORDER — EMPAGLIFLOZIN 25 MG/1
TABLET, FILM COATED ORAL
Qty: 90 TABLET | Refills: 3 | Status: SHIPPED | OUTPATIENT
Start: 2023-12-06

## 2023-12-06 NOTE — TELEPHONE ENCOUNTER
Received request via: Pharmacy    Was the patient seen in the last year in this department? Yes    Does the patient have an active prescription (recently filled or refills available) for medication(s) requested? No    Does the patient have [facility] and need 100 day supply (blood pressure, diabetes and cholesterol meds only)? Patient does not have SCP    11/30/2023

## 2023-12-08 ENCOUNTER — TELEPHONE (OUTPATIENT)
Dept: MEDICAL GROUP | Facility: PHYSICIAN GROUP | Age: 73
End: 2023-12-08
Payer: COMMERCIAL

## 2023-12-20 ENCOUNTER — TELEPHONE (OUTPATIENT)
Dept: CARDIOLOGY | Facility: MEDICAL CENTER | Age: 73
End: 2023-12-20
Payer: COMMERCIAL

## 2023-12-20 NOTE — TELEPHONE ENCOUNTER
Per chart review, cholesterol is managed by PCP, including prescribed Zetia. Returned call and informed [employer].

## 2023-12-20 NOTE — TELEPHONE ENCOUNTER
MELISSA    Caller: Karishma DANG of [place]    Topic/issue: Would like a call back to see if fax had been received for Ting to stop all statin medications.     Callback Number: [phone]    Thank you,   Marie MCKEON

## 2024-02-03 ENCOUNTER — PATIENT MESSAGE (OUTPATIENT)
Dept: CARDIOLOGY | Facility: MEDICAL CENTER | Age: 74
End: 2024-02-03
Payer: COMMERCIAL

## 2024-02-03 DIAGNOSIS — E78.2 MIXED HYPERLIPIDEMIA: ICD-10-CM

## 2024-02-05 RX ORDER — EZETIMIBE 10 MG/1
10 TABLET ORAL DAILY
Qty: 90 TABLET | Refills: 1 | Status: SHIPPED | OUTPATIENT
Start: 2024-02-05

## 2024-02-05 NOTE — PATIENT COMMUNICATION
Is the patient due for a refill? Yes    Was the patient seen the past year? Yes    Date of last office visit: 7/11/2023    Does the patient have an upcoming appointment?  No      Provider to refill:[facility]    Does the patients insurance require a 100 day supply?  No

## 2024-02-16 ENCOUNTER — PATIENT MESSAGE (OUTPATIENT)
Dept: HEALTH INFORMATION MANAGEMENT | Facility: OTHER | Age: 74
End: 2024-02-16

## 2024-05-28 ENCOUNTER — HOSPITAL ENCOUNTER (OUTPATIENT)
Dept: LAB | Facility: MEDICAL CENTER | Age: 74
End: 2024-05-28
Attending: FAMILY MEDICINE
Payer: COMMERCIAL

## 2024-05-28 DIAGNOSIS — E11.8 TYPE 2 DIABETES MELLITUS WITH COMPLICATION, WITHOUT LONG-TERM CURRENT USE OF INSULIN (HCC): ICD-10-CM

## 2024-05-28 DIAGNOSIS — N18.31 STAGE 3A CHRONIC KIDNEY DISEASE: ICD-10-CM

## 2024-05-28 LAB
ALBUMIN SERPL BCP-MCNC: 4 G/DL (ref 3.2–4.9)
BUN SERPL-MCNC: 22 MG/DL (ref 8–22)
CALCIUM ALBUM COR SERPL-MCNC: 9.2 MG/DL (ref 8.5–10.5)
CALCIUM SERPL-MCNC: 9.2 MG/DL (ref 8.5–10.5)
CHLORIDE SERPL-SCNC: 107 MMOL/L (ref 96–112)
CO2 SERPL-SCNC: 22 MMOL/L (ref 20–33)
CREAT SERPL-MCNC: 0.96 MG/DL (ref 0.5–1.4)
EST. AVERAGE GLUCOSE BLD GHB EST-MCNC: 134 MG/DL
GFR SERPLBLD CREATININE-BSD FMLA CKD-EPI: 63 ML/MIN/1.73 M 2
GLUCOSE SERPL-MCNC: 102 MG/DL (ref 65–99)
HBA1C MFR BLD: 6.3 % (ref 4–5.6)
PHOSPHATE SERPL-MCNC: 4 MG/DL (ref 2.5–4.5)
POTASSIUM SERPL-SCNC: 4.5 MMOL/L (ref 3.6–5.5)
SODIUM SERPL-SCNC: 141 MMOL/L (ref 135–145)

## 2024-06-04 ENCOUNTER — OFFICE VISIT (OUTPATIENT)
Dept: MEDICAL GROUP | Facility: PHYSICIAN GROUP | Age: 74
End: 2024-06-04
Payer: COMMERCIAL

## 2024-06-04 VITALS
DIASTOLIC BLOOD PRESSURE: 75 MMHG | OXYGEN SATURATION: 95 % | HEIGHT: 65 IN | WEIGHT: 137 LBS | HEART RATE: 64 BPM | SYSTOLIC BLOOD PRESSURE: 118 MMHG | RESPIRATION RATE: 14 BRPM | BODY MASS INDEX: 22.82 KG/M2 | TEMPERATURE: 97.5 F

## 2024-06-04 DIAGNOSIS — E78.2 MIXED HYPERLIPIDEMIA: ICD-10-CM

## 2024-06-04 DIAGNOSIS — E11.8 TYPE 2 DIABETES MELLITUS WITH COMPLICATION, WITHOUT LONG-TERM CURRENT USE OF INSULIN (HCC): ICD-10-CM

## 2024-06-04 PROCEDURE — 3074F SYST BP LT 130 MM HG: CPT | Performed by: FAMILY MEDICINE

## 2024-06-04 PROCEDURE — 99214 OFFICE O/P EST MOD 30 MIN: CPT | Performed by: FAMILY MEDICINE

## 2024-06-04 PROCEDURE — 3078F DIAST BP <80 MM HG: CPT | Performed by: FAMILY MEDICINE

## 2024-06-04 ASSESSMENT — FIBROSIS 4 INDEX: FIB4 SCORE: 0.66

## 2024-06-04 ASSESSMENT — PATIENT HEALTH QUESTIONNAIRE - PHQ9: CLINICAL INTERPRETATION OF PHQ2 SCORE: 0

## 2024-06-04 NOTE — ASSESSMENT & PLAN NOTE
The 10-year ASCVD risk score (Rodriguez AYOUB, et al., 2019) is: 24.1%  Will order cardiac ct   If elevated can start pcsk9 inhibitor after discussion with cardiologist.   She has no hx of CVA/MI, non smoker  Htn controlled, DM2 controlled  Unable to tolerate statin or zetia due to significant myalgia which resolved once these medications were stopped.

## 2024-06-04 NOTE — PROGRESS NOTES
Subjective:   Ting Gregory is a 73 y.o. female here today for evaluation and management of:     Hyperlipidemia  The 10-year ASCVD risk score (Rodriguez DK, et al., 2019) is: 24.1%  Will order cardiac ct   If elevated can start pcsk9 inhibitor after discussion with cardiologist.   She has no hx of CVA/MI, non smoker  Htn controlled, DM2 controlled  Unable to tolerate statin or zetia due to significant myalgia which resolved once these medications were stopped.       Type 2 diabetes mellitus with complication, without long-term current use of insulin (HCC)  Well controlled A1c <7  On metformin, jardiance, asa, losartan  Cannot tolerate statin and zetia  Up to date on eye and foot exams.            Current medicines (including changes today)  Current Outpatient Medications   Medication Sig Dispense Refill    JARDIANCE 25 MG Tab TAKE ONE TABLET BY MOUTH EVERY DAY 90 Tablet 3    buPROPion SR (WELLBUTRIN-SR) 150 MG TABLET SR 12 HR sustained-release tablet TAKE TWO TABLETS BY MOUTH EVERY  Tablet 3    metoprolol tartrate (LOPRESSOR) 25 MG Tab TAKE ONE TABLET BY MOUTH TWICE A  Tablet 3    metFORMIN ER (GLUCOPHAGE XR) 500 MG TABLET SR 24 HR Take 2 Tablets by mouth every day. 180 Tablet 3    losartan (COZAAR) 50 MG Tab Take 1 Tablet by mouth every day. 90 Tablet 3    pantoprazole (PROTONIX) 40 MG Tablet Delayed Response TAKE ONE TABLET BY MOUTH EVERY DAY 90 Tablet 3    vitamin D3 (CHOLECALCIFEROL) 1000 Unit (25 mcg) Tab Take 1,000 Units by mouth every day.      aspirin EC (ECOTRIN) 81 MG Tablet Delayed Response Take 1 Tablet by mouth every day. 90 Tablet 3     No current facility-administered medications for this visit.     She  has a past medical history of Anxiety, Asthma, Cataract, Depression, Diabetes mellitus type 2 in nonobese (HCC), Diabetic neuropathy (HCC), GERD (gastroesophageal reflux disease), History of mammogram, Hyperlipidemia, Hypertension, Irritable bowel syndrome, Kidney disease, Pelvic  "exam, S/P colonoscopy, and Vitamin D deficiency.    She has no past medical history of Anemia, Arrhythmia, Arthritis, Breast cancer (HCC), Cancer (HCC), Clotting disorder (HCC), COPD (chronic obstructive pulmonary disease) (HCC), Glaucoma, Heart attack (HCC), Heart murmur, HIV (human immunodeficiency virus infection) (HCC), Migraine, Seizure (HCC), Stroke (HCC), Substance abuse (HCC), or Thyroid disease.    ROS  No chest pain, no shortness of breath, no abdominal pain       Objective:     /75   Pulse 64   Temp 36.4 °C (97.5 °F) (Temporal)   Resp 14   Ht 1.651 m (5' 5\")   Wt 62.1 kg (137 lb)   SpO2 95%  Body mass index is 22.8 kg/m².   Physical Exam:  Constitutional: Alert, no distress.  Skin: Warm, dry, good turgor, no rashes in visible areas.  Eye: Equal, round and reactive, conjunctiva clear, lids normal.  ENMT: Lips without lesions, good dentition, oropharynx clear.  Neck: Trachea midline, no masses, no thyromegaly. No cervical or supraclavicular lymphadenopathy  Respiratory: Unlabored respiratory effort, lungs clear to auscultation, no wheezes, no ronchi.  Cardiovascular: Normal S1, S2, no murmur, no edema.  Abdomen: Soft, non-tender, no masses, no hepatosplenomegaly.  Psych: Alert and oriented x3, normal affect and mood.    Assessment and Plan:   The following treatment plan was discussed    1. Mixed hyperlipidemia  - Lipid Profile; Future  - CT-CARDIAC SCORING; Future    2. Type 2 diabetes mellitus with complication, without long-term current use of insulin ([facility])  - HEMOGLOBIN A1C; Future      Followup: Return in about 6 months (around 12/4/2024) for Diabetes.           "

## 2024-06-04 NOTE — ASSESSMENT & PLAN NOTE
Well controlled A1c <7  On metformin, jardiance, asa, losartan  Cannot tolerate statin and zetia  Up to date on eye and foot exams.

## 2024-06-06 ENCOUNTER — HOSPITAL ENCOUNTER (OUTPATIENT)
Dept: RADIOLOGY | Facility: MEDICAL CENTER | Age: 74
End: 2024-06-06
Attending: FAMILY MEDICINE
Payer: COMMERCIAL

## 2024-06-06 DIAGNOSIS — T46.6X5A MYALGIA DUE TO STATIN: ICD-10-CM

## 2024-06-06 DIAGNOSIS — T46.6X5A: ICD-10-CM

## 2024-06-06 DIAGNOSIS — M79.10 MYALGIA DUE TO STATIN: ICD-10-CM

## 2024-06-06 DIAGNOSIS — R93.1 AGATSTON CORONARY ARTERY CALCIUM SCORE GREATER THAN 400: ICD-10-CM

## 2024-06-06 DIAGNOSIS — E78.2 MIXED HYPERLIPIDEMIA: ICD-10-CM

## 2024-06-06 PROCEDURE — 4410556 CT-CARDIAC SCORING (SELF PAY ONLY)

## 2024-07-31 NOTE — PROGRESS NOTES
I have reviewed and agree with my Chiropractic Technician's note.    CHIEF COMPLAINT:  Left neck pain, left upper back pain     SOCIAL HISTORY:  Patient completed Chiropractic Patient History and Chiropractic Systems Review.  These were reviewed with the patient.    SUBJECTIVE (cont'd):  William continues to improve with treatment.  States he had a relaxing weekend which helped reduce symptoms.  States he has been able to change positions while at work from sitting to sit-to-stand while working on the computer which has also helped decrease his symptoms.  Patient denies current headache.  Requests evaluation and treatment.    OBJECTIVE FINDINGS:   (Cervical spine) Cervical spine facet joint function is within normal limits except for his C5 facet joints that exhibited limited passive range of motion and segmental restriction with tenderness upon palpation. The following muscles were examined for normal flexibility and tone: right and left paraspinal muscles; right and left upper trapezius muscle: right and left scalene muscle; right and left levator scapulae muscle;  right and left suboccipital muscle. These muscles were within normal limits except for his cervical paraspinals, left levator scapulae, upper trapezius muscles that exhibited limited flexibility and were hypertonic at rest.    (Thoracic spine) Thoracic spine facet joint function is within normal limits except for his T2-T4 facet joints that exhibited limited passive range of motion and segmental restriction and tenderness upon palpation. The following muscles were examined for normal flexibility and tone: right and left rhomboid muscle; right and left serratus muscle; thoracic paraspinal muscles. These muscles were within normal limits except for his rhomboids that exhibited limited flexibility and abnormal resting tone.  (Lumbar, sacral and Pelvic Spine) Lumbar spine facet joint function is within normal limits except for his L5 facet joints that  ac   exhibited limited passive range of motion and segmental restriction and tenderness on palpation; sacroiliac joint function is within normal limits except for his right Sacroiliac joint that exhibited limited passive range of motion and joint restriction.The following muscles were examined for flexibility and tone at rest: right and left piriformis muscle; right and left hamstring muscle; right and left gluteus medius muscle and gluteus aleksandra muscle; right and left quadratus lumborum; right and left tensor fasciae latae muscle; right and left quadriceps muscle; right and left lumbar paraspinal muscles. These muscles were found to be within normal limits except for his my paraspinal muscles that exhibited limited flexibility and was hypertonic at rest.  Posture/Observation/Gait:  There were no vitals taken for this visit.  Observation:   Cervical: forward head  Shoulder: rounded  Spine: increased thoracic kyphosis and increased lumbar lordosis  Comments / Details: Pain and difficulty with mobility tasks  Tightness and tenderness with palpation throughout cervical paraspinals, upper trapezius, levator scapulae, rhomboids bilaterally  Skin:  There are no lesions or abnormalities detected throughout the region(s) of complaint.   Muscle Spasm:  Mild to moderate muscle spasm is noted throughout the cervical paraspinals, left levator scapulae, upper trapezius, rhomboids muscles. Multiple trigger points noted throughout these muscles.   Orthopedic/Neurological tests:  None performed today  Assessment:   1. Cervicalgia    2. Cervical somatic dysfunction    3. Pain in thoracic spine    4. Neck pain on left side [M54.2]    5. Thoracic region somatic dysfunction       Complicating Factors/Co-morbidities:   History of Chronic neck pain, Prostate Cancer  PLAN:  Patient was evaluated and then treated with manipulation to his cervical and thoracic facet joints via diversified manipulation technique; to his cervical and thoracic  facet joints via Instrument Assisted technique  to improve function and passive range of motion of facet and/or joints. Patient also treated with contract/relax stretch to muscle noted as taut in objective findings to improve flexibility and decrease strain to spinal structures. . Patient reports no adverse response to manipulation.   Therapeutic Exercise/Rehab/Modalities performed today:Moist heat was applied by my assistant preceding skilled, supervised treatment.  It was applied over the paraspinal musculature adjacent to the treated joints to reduce hypertonicity, promote analgesia, and to increase blood flow in the surrounding musculature.  Due to bundling of services, a charge will not be added to the patient's account. Alberto warm therapy gel applied to the cervical and upper thoracic musculature to reduce hypertonicity, promote healing and provide analgesia.   Patient Instruction/Education:   Patient was educated in the nature of condition and likely pain generators as well as plan of care to resolve symptoms and improve muscular and skeletal function.  Patient noted verbal understanding of condition and is agreeable to plan of care. Patient stated understanding of, and was in agreement with, the discussed instructions.  Goals of Care: Goal of care is to improve muscular and skeletal function and provide symptom relief.   Additional Goals Include and to be obtained by end of this plan of care.   To be obtained by end of this plan of care:  Patient independent with modified and progressed home exercise program.  Patient will decrease symptoms by 60-80% of current Visual Analog Pain Scale Score.  Patient’s active range-of-motion will be within normal limits with no/minimal pain.   Patient will be able to tolerate standing activities for greater than or equal to 90 minutes without pain/difficulty.  Patient will demonstrate proper body mechanics for sitting and standing.  Patient will be able to tolerate sitting  activities for greater than or equal to 90 minutes without pain/difficulty.  Patient will be able to sleep/rollover in bed without pain or difficulty.   Patient will be able to walk without pain or difficulty for greater than or equal to 45 minutes.   Patient will be able to bend and lift for activities of daily living and instrumental activities of daily living completion at home and work without pain/difficulty.  Patient’s DOD/VA pain questionnaire will be decreased by 50-70%.     Ice Instruction: Proper and safe icing instructions were given to the patient and reviewed.   I recommend ice application at home, to be applied every 2-3  times per day or as needed.  The therapy should be applied for 10-20 minutes on affected area-then off affected area for 60 minutes.  A thin, preferably damp, towel should be inserted between the ice pack and the skin.    Other treatment options discussed with patient:  None  Plan of care:  Patient is advised to schedule treatment 2-3 times per week for 2-3 weeks.   Patient is to return Thursday for continued care and treatment of his condition consistent with plan of care.  Treatment today is considered active treatment (AT).   Clinician time: I spent a total of 24 minutes on the day of the visit.  This includes chart review, face to face time, documenting, counseling and coordination of care, not including staff or therapy time completed during this visit.

## 2024-08-16 ASSESSMENT — ENCOUNTER SYMPTOMS
DIARRHEA: 0
DYSPNEA ON EXERTION: 0
SHORTNESS OF BREATH: 0
NAUSEA: 0
PALPITATIONS: 0
ORTHOPNEA: 0
NEAR-SYNCOPE: 0
SYNCOPE: 0
WHEEZING: 0
NIGHT SWEATS: 0
COUGH: 0
WEAKNESS: 0
FOCAL WEAKNESS: 0
FEVER: 0
IRREGULAR HEARTBEAT: 0
DIZZINESS: 0
PND: 0
ABDOMINAL PAIN: 0
VOMITING: 0

## 2024-08-16 NOTE — PROGRESS NOTES
Cardiology Follow-up Consultation Note    Date of note:  08/19/24  Primary Care Provider: OLINDA Stevens    Patient Name: Ting Gregory   YOB: 1950  MRN:              [id number]    Chief Complaint: Follow-up paroxysmal SVT    History of Present Illness: Ms. Ting Gregory is a 74 y.o. female whose current medical problems include paroxysmal SVT, dyslipidemia, hypertension, and DM who is here for follow-up paroxysmal SVT.    The patient was last seen in my clinic on 07/11/23.  No changes were made to her medications at the last visit.  The patient reported through [facility] that she was no longer taking ezetimibe.    Previously the patient not complete carotid ultrasound and echocardiogram due to insurance reasons.  She wanted to hold off the studies for now as she is feeling quite well. No changes were made to her medications.     The patient returns today for follow-up. The patient reports feeling well today.  She denies any chest pain or shortness on exertion.  The patient reports that there is a lot of family stress, and she would like to hold off taking any medications at this time.  No orthopnea, PND, or leg swelling.  No palpitations.  No syncope or presyncopal episodes.    Cardiovascular Risk Factors:  1. Smoking status: Former smoker  2. Type II Diabetes Mellitus: On medication  Lab Results   Component Value Date/Time    HBA1C 6.3 (H) 05/28/2024 07:27 AM    HBA1C 6.3 (H) 11/21/2023 08:05 AM     3. Hypertension: On medication  4. Dyslipidemia: Diet controlled  Lab Results   Component Value Date/Time    CHOLSTRLTOT 245 (H) 02/15/2023 0752    TRIGLYCERIDE 173 (H) 02/15/2023 0752    HDL 48 02/15/2023 0752     (H) 02/15/2023 0752     5. Family history of early Coronary Artery Disease in a first degree relative (Male less than 55 years of age; Female less than 65 years of age): None  6.  Obesity and/or Metabolic Syndrome: BMI 22.8  7. Sedentary lifestyle:  Active    Review of Systems   Constitutional: Negative for fever, malaise/fatigue and night sweats.   Cardiovascular:  Negative for chest pain, dyspnea on exertion, irregular heartbeat, leg swelling, near-syncope, orthopnea, palpitations, paroxysmal nocturnal dyspnea and syncope.   Respiratory:  Negative for cough, shortness of breath and wheezing.    Gastrointestinal:  Negative for abdominal pain, diarrhea, nausea and vomiting.   Neurological:  Negative for dizziness, focal weakness and weakness.       All other systems reviewed and are negative.       Current Outpatient Medications   Medication Sig Dispense Refill    Cyanocobalamin (VITAMIN B-12 PO) Take  by mouth every day.      JARDIANCE 25 MG Tab TAKE ONE TABLET BY MOUTH EVERY DAY 90 Tablet 3    buPROPion SR (WELLBUTRIN-SR) 150 MG TABLET SR 12 HR sustained-release tablet TAKE TWO TABLETS BY MOUTH EVERY  Tablet 3    metoprolol tartrate (LOPRESSOR) 25 MG Tab TAKE ONE TABLET BY MOUTH TWICE A DAY (Patient taking differently: Taking 1 tablet by mouth once daily) 180 Tablet 3    metFORMIN ER (GLUCOPHAGE XR) 500 MG TABLET SR 24 HR Take 2 Tablets by mouth every day. 180 Tablet 3    losartan (COZAAR) 50 MG Tab Take 1 Tablet by mouth every day. 90 Tablet 3    pantoprazole (PROTONIX) 40 MG Tablet Delayed Response TAKE ONE TABLET BY MOUTH EVERY DAY 90 Tablet 3    vitamin D3 (CHOLECALCIFEROL) 1000 Unit (25 mcg) Tab Take 1,000 Units by mouth every day.      aspirin EC (ECOTRIN) 81 MG Tablet Delayed Response Take 1 Tablet by mouth every day. 90 Tablet 3     No current facility-administered medications for this visit.         Allergies   Allergen Reactions    Ace Inhibitors Swelling    Augmentin      Severe rash    Demerol Rash     Rash      Fiorinal Rash     Rash      Minipress [Fd&C Blue #1-Fd&C Red #40-Prazosin] Unspecified     Cannot remember reaction    Prednisone Unspecified     Migraine, joint pain swelling    Statins [Hmg-Coa-R Inhibitors]      Severe cramps     "Trilipix [Choline Fenofibrate]      Angioedema, hives    Vicodin [Hydrocodone-Acetaminophen] Rash     Rash           Physical Exam:  Ambulatory Vitals  BP (!) 160/82 (BP Location: Left arm, Patient Position: Sitting, BP Cuff Size: Adult)   Pulse (!) 53   Resp 14   Ht 1.651 m (5' 5\")   Wt 62.4 kg (137 lb 9.6 oz)   SpO2 99%    Oxygen Therapy:  Pulse Oximetry: 99 %  BP Readings from Last 4 Encounters:   08/19/24 (!) 160/82   06/04/24 118/75   11/30/23 120/78   08/18/23 118/84       Weight/BMI: Body mass index is 22.9 kg/m².  Wt Readings from Last 4 Encounters:   08/19/24 62.4 kg (137 lb 9.6 oz)   06/04/24 62.1 kg (137 lb)   11/30/23 62.6 kg (138 lb)   08/18/23 60.8 kg (134 lb)       General: Well appearing and in no apparent distress  Eyes: nl conjunctiva, no icteric sclera  ENT: normal external appearance of ears, nose, and throat  Neck: no visible JVP,  no carotid bruits  Lungs: normal respiratory effort, CTAB  Heart: RRR, no murmurs, no rubs or gallops,  no edema bilateral lower extremities. No LV/RV heave on cardiac palpatation. + bilateral radial pulses.  + bilateral dp pulses.   Abdomen: soft, non tender, non distended, no masses, normal bowel sounds.  No HSM.  Extremities/MSK: no clubbing, no cyanosis  Neurological: No focal sensory deficits  Psychiatric: Appropriate affect, A/O x 3, intact judgement and insight  Skin: Warm extremities        Lab Data Review:  Lab Results   Component Value Date/Time    CHOLSTRLTOT 192 08/11/2023 07:50 AM     (H) 08/11/2023 07:50 AM    HDL 53 08/11/2023 07:50 AM    TRIGLYCERIDE 169 (H) 08/11/2023 07:50 AM       Lab Results   Component Value Date/Time    SODIUM 141 05/28/2024 07:27 AM    POTASSIUM 4.5 05/28/2024 07:27 AM    CHLORIDE 107 05/28/2024 07:27 AM    CO2 22 05/28/2024 07:27 AM    GLUCOSE 102 (H) 05/28/2024 07:27 AM    BUN 22 05/28/2024 07:27 AM    CREATININE 0.96 05/28/2024 07:27 AM    CREATININE 0.75 01/24/2012 11:53 AM    BUNCREATRAT 24 01/24/2012 11:53 AM "     Lab Results   Component Value Date/Time    ALKPHOSPHAT 78 02/15/2023 07:52 AM    ASTSGOT 14 02/15/2023 07:52 AM    ALTSGPT 20 02/15/2023 07:52 AM    TBILIRUBIN 0.5 02/15/2023 07:52 AM      Lab Results   Component Value Date/Time    WBC 6.7 02/15/2023 07:49 AM    WBC 6.8 01/24/2012 11:53 AM    HEMOGLOBIN 14.7 02/15/2023 07:49 AM     Lab Results   Component Value Date/Time    HBA1C 6.3 (H) 05/28/2024 07:27 AM    HBA1C 6.3 (H) 11/21/2023 08:05 AM         Cardiac Imaging and Procedures Review:    EKG dated 10/11/2021: My personal interpretation is sinus rhythm, low voltage in frontal leads    No prior echocardiogram    Event monitor 2/2022  48 Hour Holter Summary:   Sinus rhythm, ave HR 61 bpm, range  bpm.   No pauses.   Rare PVCs.   PACs present.   Brief atrial runs, up to 12 beats, up to 146 bpm, most c/w atrial   tachycardia.   No sustained rhythm changes.   No symptoms reported.      Assessment & Plan     1. Paroxysmal SVT (supraventricular tachycardia) (HCC)        2. Near syncope        3. Essential hypertension        4. Dyslipidemia        5. Statin intolerance        6. Allergy to ezetimibe              Shared Medical Decision Making:    Paroxysmal SVT  Near syncope, resolved  Asymptomatic  -Continue metoprolol 25 mg daily (she cannot tolerate a higher dose or twice daily dose)  -Counseled on lifestyle changes such as remaining well-hydrated and changing positions slowly/getting out of bed slowly.     Hypertension  BP elevated this visit  -Continue losartan 50mg daily   -Continue metoprolol as above  -Discussed switching metoprolol to carvedilol, but patient would like to hold off at this time given recent family stress.  -Counseled the patient to measure BP at home for the next week. If home BP elevated above 130/80, we will need to adjust anti-hypertensives.  Consider switching metoprolol to carvedilol if BP remains elevated.     Dyslipidemia  Statin intolerance  Ezetimibe intolerance  The 10-year  ASCVD risk score (Rodriguez AYOUB, et al., 2019) is: 43.5%  -Patient is allergic to statins and ezetimibe.   -Continue aspirin 81mg daily  -Discussed referral to lipid clinic to be considered for PCSK9 inhibitors, which the patient declined at this time, understanding risks and benefits.  We will revisit the topic next visit.    All of the patient's excellent questions were answered to the best of my knowledge and to her satisfaction.  It was a pleasure seeing Ms. Ting Gregory in my clinic today. Return in about 6 months (around 2/19/2025), or if symptoms worsen or fail to improve. Patient is aware to call the cardiology clinic with any questions or concerns.      Paloma Finley MD  [facility]  [facility], [facility]  [address] [address]  Phone: [phone]  Fax: [phone]

## 2024-08-17 DIAGNOSIS — I10 ESSENTIAL HYPERTENSION: ICD-10-CM

## 2024-08-17 DIAGNOSIS — N18.31 STAGE 3A CHRONIC KIDNEY DISEASE: ICD-10-CM

## 2024-08-19 ENCOUNTER — OFFICE VISIT (OUTPATIENT)
Dept: CARDIOLOGY | Facility: MEDICAL CENTER | Age: 74
End: 2024-08-19
Attending: STUDENT IN AN ORGANIZED HEALTH CARE EDUCATION/TRAINING PROGRAM
Payer: COMMERCIAL

## 2024-08-19 VITALS
RESPIRATION RATE: 14 BRPM | DIASTOLIC BLOOD PRESSURE: 82 MMHG | HEART RATE: 53 BPM | SYSTOLIC BLOOD PRESSURE: 160 MMHG | HEIGHT: 65 IN | WEIGHT: 137.6 LBS | BODY MASS INDEX: 22.92 KG/M2 | OXYGEN SATURATION: 99 %

## 2024-08-19 DIAGNOSIS — I47.10 PAROXYSMAL SVT (SUPRAVENTRICULAR TACHYCARDIA) (HCC): ICD-10-CM

## 2024-08-19 DIAGNOSIS — E78.5 DYSLIPIDEMIA: ICD-10-CM

## 2024-08-19 DIAGNOSIS — Z88.8: ICD-10-CM

## 2024-08-19 DIAGNOSIS — I10 ESSENTIAL HYPERTENSION: ICD-10-CM

## 2024-08-19 DIAGNOSIS — R55 NEAR SYNCOPE: ICD-10-CM

## 2024-08-19 DIAGNOSIS — Z78.9 STATIN INTOLERANCE: ICD-10-CM

## 2024-08-19 PROCEDURE — 99212 OFFICE O/P EST SF 10 MIN: CPT | Performed by: STUDENT IN AN ORGANIZED HEALTH CARE EDUCATION/TRAINING PROGRAM

## 2024-08-19 PROCEDURE — 3077F SYST BP >= 140 MM HG: CPT | Performed by: STUDENT IN AN ORGANIZED HEALTH CARE EDUCATION/TRAINING PROGRAM

## 2024-08-19 PROCEDURE — 3079F DIAST BP 80-89 MM HG: CPT | Performed by: STUDENT IN AN ORGANIZED HEALTH CARE EDUCATION/TRAINING PROGRAM

## 2024-08-19 PROCEDURE — 99214 OFFICE O/P EST MOD 30 MIN: CPT | Performed by: STUDENT IN AN ORGANIZED HEALTH CARE EDUCATION/TRAINING PROGRAM

## 2024-08-19 ASSESSMENT — FIBROSIS 4 INDEX: FIB4 SCORE: 0.67

## 2024-08-19 NOTE — PATIENT INSTRUCTIONS
Measure blood pressure at home.   Goal is less than 130/80. If BP remains above 130/80 by next week, please message us, we will switch from metoprolol to carvedilol.

## 2024-08-22 RX ORDER — LOSARTAN POTASSIUM 50 MG/1
50 TABLET ORAL
Qty: 90 TABLET | Refills: 3 | Status: SHIPPED | OUTPATIENT
Start: 2024-08-22

## 2024-08-23 RX ORDER — PANTOPRAZOLE SODIUM 40 MG/1
40 TABLET, DELAYED RELEASE ORAL
Qty: 90 TABLET | Refills: 3 | Status: SHIPPED | OUTPATIENT
Start: 2024-08-23

## 2024-08-23 RX ORDER — METFORMIN HCL 500 MG
1000 TABLET, EXTENDED RELEASE 24 HR ORAL
Qty: 180 TABLET | Refills: 3 | Status: SHIPPED | OUTPATIENT
Start: 2024-08-23

## 2024-08-23 NOTE — TELEPHONE ENCOUNTER
Received request via: Patient    Was the patient seen in the last year in this department? Yes    Does the patient have an active prescription (recently filled or refills available) for medication(s) requested? No    Ph[facility] Name: [facility]     Does the patient have [facility] Plus and need 100-day supply? (This applies to ALL medications) Patient does not have SCP

## 2024-08-26 ENCOUNTER — PATIENT MESSAGE (OUTPATIENT)
Dept: CARDIOLOGY | Facility: MEDICAL CENTER | Age: 74
End: 2024-08-26
Payer: COMMERCIAL

## 2024-08-26 DIAGNOSIS — I10 PRIMARY HYPERTENSION: ICD-10-CM

## 2024-08-26 RX ORDER — CARVEDILOL 3.12 MG/1
3.12 TABLET ORAL 2 TIMES DAILY WITH MEALS
Qty: 180 TABLET | Refills: 2 | Status: SHIPPED | OUTPATIENT
Start: 2024-08-26

## 2024-08-26 NOTE — TELEPHONE ENCOUNTER
Paloma Finley M.D.  You3 hours ago (9:11 AM)       Lets do carvedilol 3.125 mg twice daily instead of metoprolol. Thank you!

## 2024-09-05 ENCOUNTER — PATIENT MESSAGE (OUTPATIENT)
Dept: MEDICAL GROUP | Facility: PHYSICIAN GROUP | Age: 74
End: 2024-09-05
Payer: COMMERCIAL

## 2024-09-05 ENCOUNTER — PATIENT MESSAGE (OUTPATIENT)
Dept: CARDIOLOGY | Facility: MEDICAL CENTER | Age: 74
End: 2024-09-05
Payer: COMMERCIAL

## 2024-09-05 DIAGNOSIS — I10 ESSENTIAL HYPERTENSION: ICD-10-CM

## 2024-09-05 DIAGNOSIS — N18.31 STAGE 3A CHRONIC KIDNEY DISEASE: ICD-10-CM

## 2024-09-05 RX ORDER — LOSARTAN POTASSIUM 50 MG/1
75 TABLET ORAL
Qty: 135 TABLET | Refills: 1 | Status: SHIPPED | OUTPATIENT
Start: 2024-09-05

## 2024-11-16 DIAGNOSIS — F33.1 MAJOR DEPRESSIVE DISORDER, RECURRENT EPISODE, MODERATE (HCC): ICD-10-CM

## 2024-11-19 RX ORDER — BUPROPION HYDROCHLORIDE 150 MG/1
TABLET, EXTENDED RELEASE ORAL
Qty: 180 TABLET | Refills: 3 | Status: SHIPPED | OUTPATIENT
Start: 2024-11-19

## 2024-11-19 NOTE — TELEPHONE ENCOUNTER
Received request via: Pharmacy    Was the patient seen in the last year in this department? Yes    Does the patient have an active prescription (recently filled or refills available) for medication(s) requested? No    Pharmacy Name: [facility]    Does the patient have assisted Plus and need 100-day supply? (This applies to ALL medications) Patient does not have SCP

## 2024-11-20 DIAGNOSIS — E11.8 TYPE 2 DIABETES MELLITUS WITH COMPLICATION, WITHOUT LONG-TERM CURRENT USE OF INSULIN (HCC): ICD-10-CM

## 2024-11-25 ENCOUNTER — HOSPITAL ENCOUNTER (OUTPATIENT)
Dept: LAB | Facility: MEDICAL CENTER | Age: 74
End: 2024-11-25
Attending: FAMILY MEDICINE
Payer: COMMERCIAL

## 2024-11-25 DIAGNOSIS — E11.8 TYPE 2 DIABETES MELLITUS WITH COMPLICATION, WITHOUT LONG-TERM CURRENT USE OF INSULIN (HCC): ICD-10-CM

## 2024-11-25 DIAGNOSIS — E78.2 MIXED HYPERLIPIDEMIA: ICD-10-CM

## 2024-11-25 LAB
CHOLEST SERPL-MCNC: 257 MG/DL (ref 100–199)
CREAT UR-MCNC: 65.08 MG/DL
EST. AVERAGE GLUCOSE BLD GHB EST-MCNC: 126 MG/DL
FASTING STATUS PATIENT QL REPORTED: NORMAL
HBA1C MFR BLD: 6 % (ref 4–5.6)
HDLC SERPL-MCNC: 54 MG/DL
LDLC SERPL CALC-MCNC: 166 MG/DL
MICROALBUMIN UR-MCNC: <1.2 MG/DL
MICROALBUMIN/CREAT UR: NORMAL MG/G (ref 0–30)
TRIGL SERPL-MCNC: 183 MG/DL (ref 0–149)

## 2024-11-25 PROCEDURE — 36415 COLL VENOUS BLD VENIPUNCTURE: CPT

## 2024-11-25 PROCEDURE — 80061 LIPID PANEL: CPT

## 2024-11-25 PROCEDURE — 83036 HEMOGLOBIN GLYCOSYLATED A1C: CPT

## 2024-11-25 PROCEDURE — 82570 ASSAY OF URINE CREATININE: CPT

## 2024-11-25 PROCEDURE — 82043 UR ALBUMIN QUANTITATIVE: CPT

## 2024-12-05 ENCOUNTER — OFFICE VISIT (OUTPATIENT)
Dept: MEDICAL GROUP | Facility: PHYSICIAN GROUP | Age: 74
End: 2024-12-05
Payer: COMMERCIAL

## 2024-12-05 VITALS
DIASTOLIC BLOOD PRESSURE: 84 MMHG | BODY MASS INDEX: 22.49 KG/M2 | RESPIRATION RATE: 16 BRPM | OXYGEN SATURATION: 99 % | HEIGHT: 65 IN | SYSTOLIC BLOOD PRESSURE: 132 MMHG | TEMPERATURE: 98.4 F | WEIGHT: 135 LBS | HEART RATE: 90 BPM

## 2024-12-05 DIAGNOSIS — Z12.83 SKIN CANCER SCREENING: ICD-10-CM

## 2024-12-05 DIAGNOSIS — E11.8 TYPE 2 DIABETES MELLITUS WITH COMPLICATION, WITHOUT LONG-TERM CURRENT USE OF INSULIN (HCC): ICD-10-CM

## 2024-12-05 PROCEDURE — 99214 OFFICE O/P EST MOD 30 MIN: CPT | Performed by: FAMILY MEDICINE

## 2024-12-05 PROCEDURE — 3079F DIAST BP 80-89 MM HG: CPT | Performed by: FAMILY MEDICINE

## 2024-12-05 PROCEDURE — 3075F SYST BP GE 130 - 139MM HG: CPT | Performed by: FAMILY MEDICINE

## 2024-12-05 RX ORDER — ROSUVASTATIN CALCIUM 5 MG/1
5 TABLET, COATED ORAL EVERY EVENING
Qty: 30 TABLET | Refills: 11 | Status: SHIPPED | OUTPATIENT
Start: 2024-12-05

## 2024-12-05 ASSESSMENT — FIBROSIS 4 INDEX: FIB4 SCORE: 0.67

## 2024-12-11 NOTE — PROGRESS NOTES
Subjective:   Ting Gregory is a 74 y.o. female here today for evaluation and management of:     History of Present Illness  The patient presents for evaluation of cholesterol management, diabetes, and hallucinations.    She has been experiencing muscle pain as a side effect of statin therapy. A recent CT scan was conducted to facilitate insurance approval for an alternative treatment, but the request was denied. She recalls a positive response to Crestor in the past and is considering its reintroduction. She does not have a copy of the insurance denial letter. She plans to communicate with her cardiologist regarding her cholesterol levels, as she was previously advised to schedule a follow-up visit in 6 months.    She reports that carvedilol, prescribed by her cardiologist for blood pressure management, induced hallucinations after 3 to 4 days of use. These hallucinations were characterized by vivid images of  individuals walking through her bedroom at night, which ceased upon discontinuation of the medication. She also experienced a similar reaction to another medication, the name of which she can not recall. She reports poor sleep quality.    She maintains good foot hygiene, soaking her feet twice weekly post-shower and applying cream. She is currently on a regimen of vitamin D3.    She has not established care with a dermatologist for skin cancer screenings and has discontinued mammogram screenings following the death of her .    SOCIAL HISTORY  She reports no smoking or alcohol intake.    ALLERGIES  The patient has had an allergic reaction to ZETIA and ATORVASTATIN.    MEDICATIONS  Current: baby aspirin, vitamin D3  Past: Crestor, Zetia, atorvastatin, carvedilol          Current medicines (including changes today)  Current Outpatient Medications   Medication Sig Dispense Refill    rosuvastatin (CRESTOR) 5 MG Tab Take 1 Tablet by mouth every evening. 30 Tablet 11    buPROPion SR  "(WELLBUTRIN-SR) 150 MG TABLET SR 12 HR sustained-release tablet TAKE TWO TABLETS BY MOUTH EVERY  Tablet 3    losartan (COZAAR) 50 MG Tab Take 1.5 Tablets by mouth every day. 135 Tablet 1    metFORMIN ER (GLUCOPHAGE XR) 500 MG TABLET SR 24 HR TAKE TWO TABLETS BY MOUTH EVERY  Tablet 3    pantoprazole (PROTONIX) 40 MG Tablet Delayed Response TAKE ONE TABLET BY MOUTH EVERY DAY 90 Tablet 3    JARDIANCE 25 MG Tab TAKE ONE TABLET BY MOUTH EVERY DAY 90 Tablet 3    vitamin D3 (CHOLECALCIFEROL) 1000 Unit (25 mcg) Tab Take 1,000 Units by mouth every day.      aspirin EC (ECOTRIN) 81 MG Tablet Delayed Response Take 1 Tablet by mouth every day. 90 Tablet 3     No current facility-administered medications for this visit.     She  has a past medical history of Anxiety, Asthma, Cataract, Depression, Diabetes mellitus type 2 in nonobese (HCC), Diabetic neuropathy (HCC), GERD (gastroesophageal reflux disease), History of mammogram, Hyperlipidemia, Hypertension, Irritable bowel syndrome, Kidney disease, Pelvic exam, S/P colonoscopy, and Vitamin D deficiency.    She has no past medical history of Anemia, Arrhythmia, Arthritis, Breast cancer (HCC), Cancer (HCC), Clotting disorder (HCC), COPD (chronic obstructive pulmonary disease) (HCC), Glaucoma, Heart attack (HCC), Heart murmur, HIV (human immunodeficiency virus infection) (HCC), Migraine, Seizure (HCC), Stroke (HCC), Substance abuse (HCC), or Thyroid disease.    ROS  No chest pain, no shortness of breath, no abdominal pain       Objective:     /84 (BP Location: Left arm, Patient Position: Sitting, BP Cuff Size: Adult)   Pulse 90   Temp 36.9 °C (98.4 °F) (Temporal)   Resp 16   Ht 1.651 m (5' 5\")   Wt 61.2 kg (135 lb)   SpO2 99%  Body mass index is 22.47 kg/m².   Physical Exam:  Constitutional: Alert, no distress.  Skin: Warm, dry, good turgor, no rashes in visible areas.  Eye: Equal, round and reactive, conjunctiva clear, lids normal.  ENMT: Lips without " lesions, good dentition, oropharynx clear.  Neck: Trachea midline, no masses, no thyromegaly. No cervical or supraclavicular lymphadenopathy  Respiratory: Unlabored respiratory effort, lungs clear to auscultation, no wheezes, no ronchi.  Cardiovascular: Normal S1, S2, no murmur, no edema.  Abdomen: Soft, non-tender, no masses, no hepatosplenomegaly.  Psych: Alert and oriented x3, normal affect and mood.       The following treatment plan was discussed  Assessment & Plan  1. Cholesterol management.  Her LDL levels are elevated, and she has a cardiac calcium score of 967, indicating a need for effective cholesterol management. Previous statins caused muscle pain. A prescription for Crestor 5 mg, consisting of 30 tablets, has been provided. She is advised to discontinue the medication if it induces muscle aches and cramps. If the medication is well-tolerated, a 90-day supply will be considered in the future. She is also advised to consult with her cardiologist regarding potential alternative medications.    2. Diabetes.  Her A1c levels are within the normal range. The diabetic foot examination revealed decreased sensation in both feet, but no ulcers or deformities were present. There is a mild callus, but she is taking good care of her feet. She is advised to continue her current diabetes management regimen.    3. Hallucinations.  She reported experiencing hallucinations while on certain medications, including carvedilol. The hallucinations ceased after discontinuing the medication. She is advised to avoid medications that have previously caused hallucinations and to monitor for any new symptoms if starting a new medication.    4. Health maintenance.  A referral to a dermatologist for skin cancer screening has been made. She is advised to undergo this screening at least once, and subsequently every 5 years, or sooner if any new skin changes are observed. She is also encouraged to resume regular mammogram  screenings.    Follow-up  The patient is scheduled for a follow-up visit in 6 months.    1. Type 2 diabetes mellitus with complication, without long-term current use of insulin (HCC)  - Diabetic Monofilament LE Exam  - HEMOGLOBIN A1C; Future  - Lipid Profile; Future  - Comp Metabolic Panel; Future    2. Skin cancer screening  - Referral to Dermatology    Other orders  - rosuvastatin (CRESTOR) 5 MG Tab; Take 1 Tablet by mouth every evening.  Dispense: 30 Tablet; Refill: 11      Followup: Return in about 6 months (around 6/5/2025) for Lab Review.  Verbal consent was acquired by the patient to use [employer] ambient listening note generation during this visit Yes

## 2024-12-13 DIAGNOSIS — E11.8 TYPE 2 DIABETES MELLITUS WITH COMPLICATION, WITHOUT LONG-TERM CURRENT USE OF INSULIN (HCC): ICD-10-CM

## 2024-12-17 RX ORDER — EMPAGLIFLOZIN 25 MG/1
TABLET, FILM COATED ORAL
Qty: 90 TABLET | Refills: 3 | Status: SHIPPED | OUTPATIENT
Start: 2024-12-17

## 2024-12-17 NOTE — TELEPHONE ENCOUNTER
Received request via: Patient    Was the patient seen in the last year in this department? Yes    Does the patient have an active prescription (recently filled or refills available) for medication(s) requested? No    Pharmacy Name: [facility]    Does the patient have [facility] and need 100-day supply? (This applies to ALL medications) Patient does not have SCP

## 2025-01-10 ENCOUNTER — PATIENT MESSAGE (OUTPATIENT)
Dept: CARDIOLOGY | Facility: MEDICAL CENTER | Age: 75
End: 2025-01-10
Payer: COMMERCIAL

## 2025-01-17 ENCOUNTER — OFFICE VISIT (OUTPATIENT)
Dept: MEDICAL GROUP | Facility: PHYSICIAN GROUP | Age: 75
End: 2025-01-17
Payer: COMMERCIAL

## 2025-01-17 VITALS
TEMPERATURE: 98.6 F | HEART RATE: 86 BPM | OXYGEN SATURATION: 98 % | DIASTOLIC BLOOD PRESSURE: 88 MMHG | RESPIRATION RATE: 16 BRPM | HEIGHT: 65 IN | SYSTOLIC BLOOD PRESSURE: 144 MMHG | WEIGHT: 135 LBS | BODY MASS INDEX: 22.49 KG/M2

## 2025-01-17 DIAGNOSIS — I21.4 NSTEMI (NON-ST ELEVATED MYOCARDIAL INFARCTION) (HCC): ICD-10-CM

## 2025-01-17 PROCEDURE — 99214 OFFICE O/P EST MOD 30 MIN: CPT | Performed by: FAMILY MEDICINE

## 2025-01-17 PROCEDURE — 3077F SYST BP >= 140 MM HG: CPT | Performed by: FAMILY MEDICINE

## 2025-01-17 PROCEDURE — 3079F DIAST BP 80-89 MM HG: CPT | Performed by: FAMILY MEDICINE

## 2025-01-17 RX ORDER — CLOPIDOGREL BISULFATE 75 MG/1
75 TABLET ORAL DAILY
COMMUNITY

## 2025-01-17 RX ORDER — ROSUVASTATIN CALCIUM 10 MG/1
10 TABLET, COATED ORAL EVERY EVENING
Qty: 90 TABLET | Refills: 3 | Status: SHIPPED | OUTPATIENT
Start: 2025-01-17

## 2025-01-17 RX ORDER — PAROXETINE 10 MG/1
10 TABLET, FILM COATED ORAL DAILY
Qty: 90 TABLET | Refills: 3 | Status: SHIPPED | OUTPATIENT
Start: 2025-01-17

## 2025-01-17 ASSESSMENT — PATIENT HEALTH QUESTIONNAIRE - PHQ9
4. FEELING TIRED OR HAVING LITTLE ENERGY: NOT AT ALL
6. FEELING BAD ABOUT YOURSELF - OR THAT YOU ARE A FAILURE OR HAVE LET YOURSELF OR YOUR FAMILY DOWN: NOT AL ALL
1. LITTLE INTEREST OR PLEASURE IN DOING THINGS: NOT AT ALL
2. FEELING DOWN, DEPRESSED, IRRITABLE, OR HOPELESS: NOT AT ALL
3. TROUBLE FALLING OR STAYING ASLEEP OR SLEEPING TOO MUCH: MORE THAN HALF THE DAYS
SUM OF ALL RESPONSES TO PHQ9 QUESTIONS 1 AND 2: 0
9. THOUGHTS THAT YOU WOULD BE BETTER OFF DEAD, OR OF HURTING YOURSELF: NOT AT ALL
8. MOVING OR SPEAKING SO SLOWLY THAT OTHER PEOPLE COULD HAVE NOTICED. OR THE OPPOSITE, BEING SO FIGETY OR RESTLESS THAT YOU HAVE BEEN MOVING AROUND A LOT MORE THAN USUAL: NOT AT ALL
5. POOR APPETITE OR OVEREATING: NOT AT ALL
7. TROUBLE CONCENTRATING ON THINGS, SUCH AS READING THE NEWSPAPER OR WATCHING TELEVISION: NOT AT ALL
SUM OF ALL RESPONSES TO PHQ QUESTIONS 1-9: 2

## 2025-01-17 ASSESSMENT — FIBROSIS 4 INDEX: FIB4 SCORE: 0.67

## 2025-01-18 PROBLEM — I21.4 NSTEMI (NON-ST ELEVATED MYOCARDIAL INFARCTION) (HCC): Status: ACTIVE | Noted: 2025-01-18

## 2025-01-18 NOTE — PROGRESS NOTES
Subjective:   Ting Gregory is a 74 y.o. female here today for evaluation and management of:     NSTEMI (non-ST elevated myocardial infarction) (HCC)  Pt had a few days of left sided chest pain radiating to left arm and neck. She felt like she had swallowed a bubble of air and it was trapped in her chest. Was evaluated in [facility] and found to have an NSTEMI, had 3 vessel disease on heart cath. Was given options of stenting, cabg or medical management and she opted for medical management. Records requested.   Cannot tolerate carvedilol or metoprolol due to hallucinating seeing dead people when on beta blockers. Is on asa and plavix. Prior severe cramps with statins but tolerating crestor 5 mg and will try inc to 10 mg and gradually increase further as tolerated.   She does not smoke or drink alcohol.   She has close follow up with cardiology at [facility] this month and a repeat echo in March.   She notes bp at home is 120s/70s. She is on losartan 50 mg, jardiance 25 mg  On metformin 500mg a day, A1c 6.0    Today in clinic and since her hospital discharge she reports no chest pain or pressure or shortness of breath or headaches. She feels well.   Follow up with cardiology as scheduled. Discuss with them that unable to take the beta blockers due to adverse side effects, continue on asa, plavix, statin, arb, sglt2  ER precautions discussed.          History of Present Illness  The patient is a 74-year-old female who presents for a follow-up visit.    She was previously seen at [facility] for a non-ST-elevation myocardial infarction (NSTEMI). During her visit, she underwent cardiac catheterization, which revealed 3-vessel coronary artery disease. She was presented with 3 treatment options: stenting, medication management, or bypass surgery. She opted for medication management and has a follow-up appointment with cardiology on 01/31/2025. A repeat echocardiogram is scheduled in 3 months. She experienced  an episode of chest pain last Thursday, characterized by pressure in the left chest, radiating down her left arm and up her neck. The pain was intermittent, lasting for about 4 hours before subsiding. She also reported visual disturbances, lightheadedness, and profuse sweating. She sought medical attention at [facility], where an EKG was performed, and she was subsequently admitted to the hospital. She was informed that her cholesterol levels were elevated and was advised to increase her statin dosage from 5 mg to 10 mg. She is currently not experiencing any chest pain or shortness of breath. She maintains an active lifestyle, walking her dog every morning for a mile. Her current medications include Plavix 75 mg and aspirin. She is not taking metoprolol due to previous intolerance. She is on a low dose of statins (5 mg) and is considering increasing the dose to 10 mg.    She is currently on bupropion for depression but is uncertain about its efficacy. She is considering adding an anti-anxiety medication or switching from bupropion to an SSRI like Prozac. She reports no current feelings of stress or anxiety. She has been on bupropion for a long time and is unsure if it is still effective. She has previously tried Prozac 10 years ago without success.    Her blood pressure readings at home have been within the normal range, typically around 120/80, although they have been lower at times. She reported that her blood pressure was very erratic during her heart attack, reaching as high as 180 and dropping to around 110. In the emergency room, her blood pressure was recorded at 207. She is currently taking losartan 50 mg, which has been effective in controlling her blood pressure.    SOCIAL HISTORY  She does not smoke.    MEDICATIONS  Current: Plavix, aspirin, losartan, Jardiance, bupropion  Discontinued: Metoprolol, carvedilol      Current medicines (including changes today)  Current Outpatient  "Medications   Medication Sig Dispense Refill    clopidogrel (PLAVIX) 75 MG Tab Take 75 mg by mouth every day.      rosuvastatin (CRESTOR) 10 MG Tab Take 1 Tablet by mouth every evening. 90 Tablet 3    PARoxetine (PAXIL) 10 MG Tab Take 1 Tablet by mouth every day. For stress/anxiety 90 Tablet 3    JARDIANCE 25 MG Tab TAKE ONE TABLET BY MOUTH EVERY DAY 90 Tablet 3    losartan (COZAAR) 50 MG Tab Take 1.5 Tablets by mouth every day. 135 Tablet 1    metFORMIN ER (GLUCOPHAGE XR) 500 MG TABLET SR 24 HR TAKE TWO TABLETS BY MOUTH EVERY  Tablet 3    pantoprazole (PROTONIX) 40 MG Tablet Delayed Response TAKE ONE TABLET BY MOUTH EVERY DAY 90 Tablet 3    vitamin D3 (CHOLECALCIFEROL) 1000 Unit (25 mcg) Tab Take 1,000 Units by mouth every day.      aspirin EC (ECOTRIN) 81 MG Tablet Delayed Response Take 1 Tablet by mouth every day. 90 Tablet 3     No current facility-administered medications for this visit.     She  has a past medical history of Anxiety, Asthma, Cataract, Depression, Diabetes mellitus type 2 in nonobese ([facility]), Diabetic neuropathy ([facility]), GERD (gastroesophageal reflux disease), History of mammogram, Hyperlipidemia, Hypertension, Irritable bowel syndrome, Kidney disease, NSTEMI (non-ST elevated myocardial infarction) ([facility]) (1/18/2025), Pelvic exam, S/P colonoscopy, and Vitamin D deficiency.    She has no past medical history of Anemia, Arrhythmia, Arthritis, Breast cancer ([facility]), Cancer ([facility]), Clotting disorder ([facility]), COPD (chronic obstructive pulmonary disease) ([facility]), Glaucoma, Heart murmur, HIV (human immunodeficiency virus infection) ([facility]), Migraine, Seizure ([facility]), Stroke ([facility]), Substance abuse ([facility]), or Thyroid disease.    ROS  No chest pain, no shortness of breath, no abdominal pain       Objective:     BP (!) 144/88 (BP Location: Left arm, Patient Position: Sitting, BP Cuff Size: Adult)   Pulse 86   Temp 37 °C (98.6 °F) (Temporal)   Resp 16   Ht 1.651 m (5' 5\")   Wt 61.2 kg (135 lb)   SpO2 98%  Body " mass index is 22.47 kg/m².   Physical Exam:  Constitutional: Alert, no distress.  Skin: Warm, dry, good turgor, no rashes in visible areas.  Eye: Equal, round and reactive, conjunctiva clear, lids normal.  ENMT: Lips without lesions, good dentition, oropharynx clear.  Neck: Trachea midline, no masses, no thyromegaly. No cervical or supraclavicular lymphadenopathy  Respiratory: Unlabored respiratory effort, lungs clear to auscultation, no wheezes, no ronchi.  Cardiovascular: Normal S1, S2, no murmur, no edema.  Abdomen: Soft, non-tender, no masses, no hepatosplenomegaly.  Psych: Alert and oriented x3, normal affect and mood.    Assessment and Plan:   The following treatment plan was discussed    1. NSTEMI (non-ST elevated myocardial infarction) (HCC)    Other orders  - clopidogrel (PLAVIX) 75 MG Tab; Take 75 mg by mouth every day.  - rosuvastatin (CRESTOR) 10 MG Tab; Take 1 Tablet by mouth every evening.  Dispense: 90 Tablet; Refill: 3  - PARoxetine (PAXIL) 10 MG Tab; Take 1 Tablet by mouth every day. For stress/anxiety  Dispense: 90 Tablet; Refill: 3  - Obtain Results: Other (see comment) (heart catheterization report, cardiology consult, recommendations); Obtain Results From: [facility]      Followup: Return for As Scheduled.

## 2025-05-13 ENCOUNTER — HOSPITAL ENCOUNTER (OUTPATIENT)
Dept: LAB | Facility: MEDICAL CENTER | Age: 75
End: 2025-05-13
Attending: FAMILY MEDICINE
Payer: COMMERCIAL

## 2025-05-13 DIAGNOSIS — E11.8 TYPE 2 DIABETES MELLITUS WITH COMPLICATION, WITHOUT LONG-TERM CURRENT USE OF INSULIN (HCC): ICD-10-CM

## 2025-05-13 LAB
ALBUMIN SERPL BCP-MCNC: 4.3 G/DL (ref 3.2–4.9)
ALBUMIN/GLOB SERPL: 1.7 G/DL
ALP SERPL-CCNC: 66 U/L (ref 30–99)
ALT SERPL-CCNC: 10 U/L (ref 2–50)
ANION GAP SERPL CALC-SCNC: 8 MMOL/L (ref 7–16)
AST SERPL-CCNC: 14 U/L (ref 12–45)
BILIRUB SERPL-MCNC: 0.6 MG/DL (ref 0.1–1.5)
BUN SERPL-MCNC: 25 MG/DL (ref 8–22)
CALCIUM ALBUM COR SERPL-MCNC: 9.2 MG/DL (ref 8.5–10.5)
CALCIUM SERPL-MCNC: 9.4 MG/DL (ref 8.5–10.5)
CHLORIDE SERPL-SCNC: 106 MMOL/L (ref 96–112)
CHOLEST SERPL-MCNC: 154 MG/DL (ref 100–199)
CO2 SERPL-SCNC: 24 MMOL/L (ref 20–33)
CREAT SERPL-MCNC: 0.95 MG/DL (ref 0.5–1.4)
EST. AVERAGE GLUCOSE BLD GHB EST-MCNC: 134 MG/DL
FASTING STATUS PATIENT QL REPORTED: NORMAL
GFR SERPLBLD CREATININE-BSD FMLA CKD-EPI: 63 ML/MIN/1.73 M 2
GLOBULIN SER CALC-MCNC: 2.6 G/DL (ref 1.9–3.5)
GLUCOSE SERPL-MCNC: 120 MG/DL (ref 65–99)
HBA1C MFR BLD: 6.3 % (ref 4–5.6)
HDLC SERPL-MCNC: 63 MG/DL
LDLC SERPL CALC-MCNC: 75 MG/DL
POTASSIUM SERPL-SCNC: 4.7 MMOL/L (ref 3.6–5.5)
PROT SERPL-MCNC: 6.9 G/DL (ref 6–8.2)
SODIUM SERPL-SCNC: 138 MMOL/L (ref 135–145)
TRIGL SERPL-MCNC: 78 MG/DL (ref 0–149)

## 2025-05-13 PROCEDURE — 83036 HEMOGLOBIN GLYCOSYLATED A1C: CPT

## 2025-05-13 PROCEDURE — 80061 LIPID PANEL: CPT

## 2025-05-13 PROCEDURE — 36415 COLL VENOUS BLD VENIPUNCTURE: CPT

## 2025-05-13 PROCEDURE — 80053 COMPREHEN METABOLIC PANEL: CPT

## 2025-05-16 ENCOUNTER — RESULTS FOLLOW-UP (OUTPATIENT)
Dept: MEDICAL GROUP | Facility: PHYSICIAN GROUP | Age: 75
End: 2025-05-16

## 2025-05-28 ENCOUNTER — APPOINTMENT (OUTPATIENT)
Dept: NEPHROLOGY | Facility: MEDICAL CENTER | Age: 75
End: 2025-05-28
Payer: COMMERCIAL

## 2025-06-05 ENCOUNTER — OFFICE VISIT (OUTPATIENT)
Dept: MEDICAL GROUP | Facility: PHYSICIAN GROUP | Age: 75
End: 2025-06-05
Payer: COMMERCIAL

## 2025-06-05 VITALS
OXYGEN SATURATION: 98 % | HEART RATE: 62 BPM | WEIGHT: 135 LBS | RESPIRATION RATE: 16 BRPM | HEIGHT: 65 IN | DIASTOLIC BLOOD PRESSURE: 72 MMHG | BODY MASS INDEX: 22.49 KG/M2 | TEMPERATURE: 98 F | SYSTOLIC BLOOD PRESSURE: 136 MMHG

## 2025-06-05 DIAGNOSIS — I10 PRIMARY HYPERTENSION: Primary | ICD-10-CM

## 2025-06-05 DIAGNOSIS — I21.4 NSTEMI (NON-ST ELEVATED MYOCARDIAL INFARCTION) (HCC): ICD-10-CM

## 2025-06-05 DIAGNOSIS — E11.8 TYPE 2 DIABETES MELLITUS WITH COMPLICATION, WITHOUT LONG-TERM CURRENT USE OF INSULIN (HCC): ICD-10-CM

## 2025-06-05 PROCEDURE — 3075F SYST BP GE 130 - 139MM HG: CPT | Performed by: FAMILY MEDICINE

## 2025-06-05 PROCEDURE — 3078F DIAST BP <80 MM HG: CPT | Performed by: FAMILY MEDICINE

## 2025-06-05 PROCEDURE — 99214 OFFICE O/P EST MOD 30 MIN: CPT | Performed by: FAMILY MEDICINE

## 2025-06-05 RX ORDER — ISOSORBIDE MONONITRATE 30 MG/1
TABLET, EXTENDED RELEASE ORAL
COMMUNITY
Start: 2025-05-27

## 2025-06-05 RX ORDER — ISOSORBIDE MONONITRATE 10 MG/1
TABLET ORAL
COMMUNITY
Start: 2025-05-30 | End: 2025-06-05

## 2025-06-05 RX ORDER — ROSUVASTATIN CALCIUM 5 MG/1
TABLET, COATED ORAL
COMMUNITY
Start: 2025-05-27

## 2025-06-05 NOTE — ASSESSMENT & PLAN NOTE
Bp well controlled at home 114/70  She is on losartan 50 bid, isosorbide mononitrate SR 30 mg added by her cardiologist and this resolved her chest pain  On jardiance 25 mg

## 2025-06-05 NOTE — ASSESSMENT & PLAN NOTE
Well controlled A1c 6.3  On jardiance 25, metformin 1000 daily  On asa, plavix, statin as had NSTEMI Jan 2025 July 11th scheduled for her eye exam  Has no ulcers on feet.   LDL 75, GFR 63, normal ur microalbumin.

## 2025-06-05 NOTE — PROGRESS NOTES
"Subjective:   Ting Gregory is a 74 y.o. female here today for evaluation and management of:     Hypertension  Bp well controlled at home 114/70  She is on losartan 50 bid, isosorbide mononitrate SR 30 mg added by her cardiologist and this resolved her chest pain  On jardiance 25 mg    NSTEMI (non-ST elevated myocardial infarction) ([facility])  Doing well after NSTEMI in Jan 2025  On plavix and asa followed by cardiology  Likely will need recommendation from cardiology on how long to continue DAPT 12 months or less.   Good control of lipids with rosuvastatin 5 mg.       Type 2 diabetes mellitus with complication, without long-term current use of insulin ([facility])  Well controlled A1c 6.3  On jardiance 25, metformin 1000 daily  On asa, plavix, statin as had NSTEMI Jan 2025 July 11th scheduled for her eye exam  Has no ulcers on feet.   LDL 75, GFR 63, normal ur microalbumin.          Current medicines (including changes today)  Current Medications[1]  She  has a past medical history of Anxiety, Asthma, Cataract, Depression, Diabetes mellitus type 2 in nonobese ([facility]), Diabetic neuropathy (HCC), GERD (gastroesophageal reflux disease), History of mammogram, Hyperlipidemia, Hypertension, Irritable bowel syndrome, Kidney disease, NSTEMI (non-ST elevated myocardial infarction) ([facility]) (1/18/2025), Pelvic exam, S/P colonoscopy, and Vitamin D deficiency.    She has no past medical history of Anemia, Arrhythmia, Arthritis, Breast cancer (HCC), Cancer (HCC), Clotting disorder (HCC), COPD (chronic obstructive pulmonary disease) (HCC), Glaucoma, Heart murmur, HIV (human immunodeficiency virus infection) ([facility]), Migraine, Seizure (HCC), Stroke (HCC), Substance abuse (HCC), or Thyroid disease.    ROS  No chest pain, no shortness of breath, no abdominal pain       Objective:     /72 (BP Location: Left arm, Patient Position: Sitting, BP Cuff Size: Adult)   Pulse 62   Temp 36.7 °C (98 °F) (Temporal)   Resp 16   Ht 1.651 m (5' 5\")  "  Wt 61.2 kg (135 lb)   SpO2 98%  Body mass index is 22.47 kg/m².   Physical Exam:  Constitutional: Alert, no distress.  Skin: Warm, dry, good turgor, no rashes in visible areas.  Eye: Equal, round and reactive, conjunctiva clear, lids normal.  ENMT: Lips without lesions, good dentition, oropharynx clear.  Neck: Trachea midline, no masses, no thyromegaly. No cervical or supraclavicular lymphadenopathy  Respiratory: Unlabored respiratory effort, lungs clear to auscultation, no wheezes, no ronchi.  Cardiovascular: Normal S1, S2, no murmur, no edema.  Abdomen: Soft, non-tender, no masses, no hepatosplenomegaly.  Psych: Alert and oriented x3, normal affect and mood.    Assessment and Plan:   The following treatment plan was discussed    1. Primary hypertension    2. NSTEMI (non-ST elevated myocardial infarction) ([facility])    3. Type 2 diabetes mellitus with complication, without long-term current use of insulin ([facility])    Other orders  - isosorbide mononitrate SR (IMDUR) 30 MG TABLET SR 24 HR  - rosuvastatin (CRESTOR) 5 MG Tab      Followup: No follow-ups on file.                [1]   Current Outpatient Medications   Medication Sig Dispense Refill    isosorbide mononitrate SR (IMDUR) 30 MG TABLET SR 24 HR       rosuvastatin (CRESTOR) 5 MG Tab       clopidogrel (PLAVIX) 75 MG Tab Take 75 mg by mouth every day.      PARoxetine (PAXIL) 10 MG Tab Take 1 Tablet by mouth every day. For stress/anxiety 90 Tablet 3    JARDIANCE 25 MG Tab TAKE ONE TABLET BY MOUTH EVERY DAY 90 Tablet 3    losartan (COZAAR) 50 MG Tab Take 1.5 Tablets by mouth every day. (Patient taking differently: Take 75 mg by mouth 2 times a day.) 135 Tablet 1    metFORMIN ER (GLUCOPHAGE XR) 500 MG TABLET SR 24 HR TAKE TWO TABLETS BY MOUTH EVERY  Tablet 3    pantoprazole (PROTONIX) 40 MG Tablet Delayed Response TAKE ONE TABLET BY MOUTH EVERY DAY 90 Tablet 3    vitamin D3 (CHOLECALCIFEROL) 1000 Unit (25 mcg) Tab Take 1,000 Units by mouth every day.      aspirin  EC (ECOTRIN) 81 MG Tablet Delayed Response Take 1 Tablet by mouth every day. 90 Tablet 3     No current facility-administered medications for this visit.

## 2025-06-05 NOTE — ASSESSMENT & PLAN NOTE
Doing well after NSTEMI in Jan 2025  On plavix and asa followed by cardiology  Likely will need recommendation from cardiology on how long to continue DAPT 12 months or less.   Good control of lipids with rosuvastatin 5 mg.

## 2025-08-19 RX ORDER — PANTOPRAZOLE SODIUM 40 MG/1
40 TABLET, DELAYED RELEASE ORAL
Qty: 90 TABLET | Refills: 3 | Status: SHIPPED | OUTPATIENT
Start: 2025-08-19

## 2025-08-19 RX ORDER — METFORMIN HYDROCHLORIDE 500 MG/1
1000 TABLET, EXTENDED RELEASE ORAL
Qty: 180 TABLET | Refills: 3 | Status: SHIPPED | OUTPATIENT
Start: 2025-08-19